# Patient Record
Sex: MALE | Race: WHITE | NOT HISPANIC OR LATINO | Employment: FULL TIME | URBAN - METROPOLITAN AREA
[De-identification: names, ages, dates, MRNs, and addresses within clinical notes are randomized per-mention and may not be internally consistent; named-entity substitution may affect disease eponyms.]

---

## 2017-01-22 RX ORDER — BENAZEPRIL HYDROCHLORIDE 20 MG/1
TABLET ORAL
Qty: 180 TABLET | Refills: 3 | Status: SHIPPED | OUTPATIENT
Start: 2017-01-22 | End: 2017-01-26 | Stop reason: SDUPTHER

## 2017-01-26 RX ORDER — BENAZEPRIL HYDROCHLORIDE 20 MG/1
20 TABLET ORAL 2 TIMES DAILY
Qty: 180 TABLET | Refills: 1 | Status: SHIPPED | OUTPATIENT
Start: 2017-01-26 | End: 2018-01-25 | Stop reason: SDUPTHER

## 2017-02-01 ENCOUNTER — OFFICE VISIT (OUTPATIENT)
Dept: FAMILY MEDICINE | Facility: CLINIC | Age: 72
End: 2017-02-01
Payer: MEDICARE

## 2017-02-01 VITALS
WEIGHT: 172.06 LBS | HEART RATE: 60 BPM | DIASTOLIC BLOOD PRESSURE: 60 MMHG | TEMPERATURE: 98 F | SYSTOLIC BLOOD PRESSURE: 115 MMHG | BODY MASS INDEX: 22.09 KG/M2

## 2017-02-01 DIAGNOSIS — E78.5 HYPERLIPIDEMIA, UNSPECIFIED HYPERLIPIDEMIA TYPE: ICD-10-CM

## 2017-02-01 DIAGNOSIS — Z85.820 HISTORY OF MALIGNANT MELANOMA: ICD-10-CM

## 2017-02-01 DIAGNOSIS — F17.200 TOBACCO USE DISORDER: ICD-10-CM

## 2017-02-01 DIAGNOSIS — Z23 IMMUNIZATION DUE: ICD-10-CM

## 2017-02-01 DIAGNOSIS — I10 ESSENTIAL HYPERTENSION: Primary | ICD-10-CM

## 2017-02-01 DIAGNOSIS — J41.0 SIMPLE CHRONIC BRONCHITIS: ICD-10-CM

## 2017-02-01 PROCEDURE — G0008 ADMIN INFLUENZA VIRUS VAC: HCPCS | Mod: PBBFAC,PO | Performed by: FAMILY MEDICINE

## 2017-02-01 PROCEDURE — 99999 PR PBB SHADOW E&M-EST. PATIENT-LVL II: CPT | Mod: PBBFAC,,, | Performed by: FAMILY MEDICINE

## 2017-02-01 PROCEDURE — 90670 PCV13 VACCINE IM: CPT | Mod: PBBFAC,PO | Performed by: FAMILY MEDICINE

## 2017-02-01 PROCEDURE — 99212 OFFICE O/P EST SF 10 MIN: CPT | Mod: PBBFAC,PO | Performed by: FAMILY MEDICINE

## 2017-02-01 PROCEDURE — 99214 OFFICE O/P EST MOD 30 MIN: CPT | Mod: S$PBB,,, | Performed by: FAMILY MEDICINE

## 2017-02-01 NOTE — MR AVS SNAPSHOT
[facility]  [address]  [place] [address]  Phone: [phone]  Fax: [phone]                  Dutch HINSON Soy   2017 1:30 PM   Office Visit    Description:  Male : 1945   Provider:  Azam Nicole MD   Department:  [facility]           Diagnoses this Visit        Comments    Essential hypertension    -  Primary     Hyperlipidemia, unspecified hyperlipidemia type         Simple chronic bronchitis         Tobacco use disorder     1ppd    History of malignant melanoma         Immunization due                To Do List           Future Appointments        Provider Department Dept Phone    2017 10:00 AM LAB, [place] [facility] [phone]    2017 10:15 AM NS XR2 [facility] [phone]    2017 9:40 AM Roni Olivier NP [facility] [phone]      Goals (5 Years of Data)     None      [facility]     [facility] Nurse Care Line -  Assistance  Registered nurses in the [facility] provide clinical advisement, health education, appointment booking, and other advisory services.  Call for this free service at [phone].             Medications           Message regarding Medications     Verify the changes and/or additions to your medication regime listed below are the same as discussed with your clinician today.  If any of these changes or additions are incorrect, please notify your healthcare provider.             Verify that the below list of medications is an accurate representation of the medications you are currently taking.  If none reported, the list may be blank. If incorrect, please contact your healthcare provider. Carry this list with you in case of emergency.           Current Medications     b complex vitamins tablet Take 1 tablet by mouth once daily.      benazepril (LOTENSIN) 20 MG tablet Take 1 tablet (20 mg total) by mouth 2 (two)  times daily.    multivitamin capsule Take 1 capsule by mouth once daily.             Clinical Reference Information           Vital Signs - Last Recorded  Most recent update: 2/1/2017  1:29 PM by Azam Nicole MD    BP Pulse Temp Wt BMI    115/60 60 98.3 °F (36.8 °C) 78 kg (172 lb 1.1 oz) 22.09 kg/m2      Blood Pressure          Most Recent Value    BP  115/60      Allergies as of 2/1/2017     Aspirin      Immunizations Administered on Date of Encounter - 2/1/2017     Name Date Dose VIS Date Route    Influenza - High Dose 2/1/2017 0.5 mL 8/7/2015 Intramuscular    Pneumococcal Conjugate - 13 Valent 2/1/2017 0.5 mL 11/5/2015 Intramuscular      Orders Placed During Today's Visit      Normal Orders This Visit    Influenza - High Dose (65+) (PF) (IM)     Pneumococcal Conjugate Vaccine (13 Valent) (IM)       Smoking Cessation     If you would like to quit smoking:   You may be eligible for free services if you are a [place] resident and started smoking cigarettes before September 1, 1988.  Call the [employer]ess[employer] (S[employer]) toll free at ([phone] [phone] or ([phone] [phone].   Call 1-596-QUIT-NOW if you do not meet the above criteria.

## 2017-02-01 NOTE — PROGRESS NOTES
Subjective:       Patient ID: Dutch Olivier is a 71 y.o. male.    Chief Complaint: No chief complaint on file.    HPI Comments: He is here for follow-up of hypertension.  His blood pressure has been running between 110 and 135.  He tolerates benazepril.  He is not having any significant lightheadedness.  He is working fairly hard at his job right now.  He has COPD with a daily cough but does not complain of shortness of breath.  He is experienced prostate cancer and malignant melanoma.  He shows no signs of recurrence.  He continues to smoke 1 pack per day.  He does have varicose veins but does not need any intervention.    Review of Systems   Constitutional: Negative for fever and unexpected weight change.   Respiratory: Positive for cough. Negative for shortness of breath.    Cardiovascular: Negative for chest pain, palpitations and leg swelling.   Gastrointestinal: Negative for abdominal pain.   Skin:        He sees dermatology on a regular basis.   Neurological: Negative for dizziness and headaches.   Psychiatric/Behavioral: The patient is not nervous/anxious.        Objective:     Blood pressure 115/60, pulse 60, temperature 98.3 °F (36.8 °C), weight 78 kg (172 lb 1.1 oz).      Physical Exam   Constitutional:   He is thin and weather beaten and in no distress.   Cardiovascular: Normal rate and regular rhythm.    Murmur heard.  Pulmonary/Chest: No respiratory distress.   He has a few rhonchi mostly right posterior   Abdominal: Soft. Bowel sounds are normal. He exhibits no distension and no mass. There is no tenderness.   Neurological: He is alert.   Skin:   I examined his head and neck.  His right ear has healed very well.  His ear surgery was in 2010.  He has a dark papule on his left fore head but it has been stable.       Assessment:       1. Essential hypertension    2. Hyperlipidemia, unspecified hyperlipidemia type    3. Simple chronic bronchitis    4. Tobacco use disorder    5. History of malignant  melanoma    6. Immunization due        Plan:       Prevnar.    flu shot.  I reviewed recent lab.  He does have an elevation of MCV.  He drinks 2 glasses of wine at night.

## 2017-07-24 ENCOUNTER — TELEPHONE (OUTPATIENT)
Dept: HEMATOLOGY/ONCOLOGY | Facility: CLINIC | Age: 72
End: 2017-07-24

## 2017-07-24 DIAGNOSIS — Z85.820 PERSONAL HISTORY OF MALIGNANT MELANOMA OF SKIN: Primary | ICD-10-CM

## 2017-08-23 ENCOUNTER — HOSPITAL ENCOUNTER (OUTPATIENT)
Dept: RADIOLOGY | Facility: HOSPITAL | Age: 72
Discharge: HOME | End: 2017-08-23
Attending: NURSE PRACTITIONER
Payer: MEDICARE

## 2017-08-23 DIAGNOSIS — Z85.820 PERSONAL HISTORY OF MALIGNANT MELANOMA OF SKIN: ICD-10-CM

## 2017-08-23 PROCEDURE — 71020 XR CHEST PA AND LATERAL: CPT | Mod: TC,PO

## 2017-08-23 PROCEDURE — 71020 XR CHEST PA AND LATERAL: CPT | Mod: 26,,, | Performed by: RADIOLOGY

## 2017-08-24 ENCOUNTER — OFFICE VISIT (OUTPATIENT)
Dept: HEMATOLOGY/ONCOLOGY | Facility: CLINIC | Age: 72
End: 2017-08-24
Payer: MEDICARE

## 2017-08-24 VITALS
WEIGHT: 164.25 LBS | HEIGHT: 74 IN | RESPIRATION RATE: 17 BRPM | SYSTOLIC BLOOD PRESSURE: 138 MMHG | BODY MASS INDEX: 21.08 KG/M2 | HEART RATE: 80 BPM | TEMPERATURE: 98 F | DIASTOLIC BLOOD PRESSURE: 66 MMHG

## 2017-08-24 DIAGNOSIS — Z85.820 HISTORY OF MALIGNANT MELANOMA: Primary | ICD-10-CM

## 2017-08-24 DIAGNOSIS — Z86.002 HISTORY OF CARCINOMA IN SITU OF PROSTATE: ICD-10-CM

## 2017-08-24 PROCEDURE — 1126F AMNT PAIN NOTED NONE PRSNT: CPT | Mod: ,,, | Performed by: NURSE PRACTITIONER

## 2017-08-24 PROCEDURE — 99999 PR PBB SHADOW E&M-EST. PATIENT-LVL III: CPT | Mod: PBBFAC,,, | Performed by: NURSE PRACTITIONER

## 2017-08-24 PROCEDURE — 1159F MED LIST DOCD IN RCRD: CPT | Mod: ,,, | Performed by: NURSE PRACTITIONER

## 2017-08-24 PROCEDURE — 3075F SYST BP GE 130 - 139MM HG: CPT | Mod: ,,, | Performed by: NURSE PRACTITIONER

## 2017-08-24 PROCEDURE — 3078F DIAST BP <80 MM HG: CPT | Mod: ,,, | Performed by: NURSE PRACTITIONER

## 2017-08-24 PROCEDURE — 99213 OFFICE O/P EST LOW 20 MIN: CPT | Mod: PBBFAC,PN | Performed by: NURSE PRACTITIONER

## 2017-08-24 PROCEDURE — 99213 OFFICE O/P EST LOW 20 MIN: CPT | Mod: S$PBB,,, | Performed by: NURSE PRACTITIONER

## 2017-08-24 NOTE — PROGRESS NOTES
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  He   is status post surgical resection and nine of a planned 12-month adjuvant alpha   interferon-2 beta.  He presents to the clinic today for his 72-month   post-therapy evaluation from resection.  He continues to follow with Dr. Lenin Myles, Dermatology, every six months with few keratoses.  He   denies any difficulties with fevers, chills, drenching night sweats, unexplained   weight loss, painful lymphadenopathy, rashes, pruritus, abdominal discomfort,   nausea, vomiting, constipation, diarrhea, bleeding, etc.  No other new   complaints or pertinent findings on a 10-point review of systems.    PHYSICAL EXAMINATION:  GENERAL:  Well-developed, well-nourished white gentleman in no acute distress.    Alert and oriented x3.  VITAL SIGNS:  Weight 164.2 pounds (loss of 10 pounds in one year with diet), /66,   pulse 80, respirations 17, temperature 98.2.  HEENT:  Normocephalic, atraumatic.  Oral mucosa pink and moist.  Lips without   lesions.  Tongue midline.  Oropharynx clear.  Nonicteric sclerae.  NECK:  Supple.  No adenopathy.  HEART:  Regular rate and rhythm without murmur, gallop or rub.  LUNGS:  Clear to auscultation bilaterally.  ABDOMEN:  Soft, nontender and nondistended with positive normoactive bowel   sounds.  No hepatosplenomegaly.  EXTREMITIES:  No cyanosis, clubbing or edema.  Distal pulses are intact.     AXILLAE AND GROIN:  No palpable pathologic adenopathy appreciated.  SKIN:  No signs of local reoccurrence about the patient's reconstructed right ear.    LABORATORY DATA:    Lab Results   Component Value Date    WBC 9.25 08/23/2017    HGB 15.6 08/23/2017    HCT 43.9 08/23/2017    MCV 91 08/23/2017     08/23/2017     Unremarkable differential    CMP  Sodium   Date Value Ref Range Status   08/23/2017 141 136 - 145 mmol/L Final      Potassium   Date Value Ref Range Status   08/23/2017 4.8 3.5 - 5.1 mmol/L Final     Chloride   Date Value Ref Range Status   08/23/2017 103 95 - 110 mmol/L Final     CO2   Date Value Ref Range Status   08/23/2017 27 23 - 29 mmol/L Final     Glucose   Date Value Ref Range Status   08/23/2017 112 (H) 70 - 110 mg/dL Final     BUN, Bld   Date Value Ref Range Status   08/23/2017 11 8 - 23 mg/dL Final     Creatinine   Date Value Ref Range Status   08/23/2017 0.8 0.5 - 1.4 mg/dL Final     Calcium   Date Value Ref Range Status   08/23/2017 9.5 8.7 - 10.5 mg/dL Final     Total Protein   Date Value Ref Range Status   08/23/2017 7.0 6.0 - 8.4 g/dL Final     Albumin   Date Value Ref Range Status   08/23/2017 4.0 3.5 - 5.2 g/dL Final     Total Bilirubin   Date Value Ref Range Status   08/23/2017 1.0 0.1 - 1.0 mg/dL Final     Comment:     For infants and newborns, interpretation of results should be based  on gestational age, weight and in agreement with clinical  observations.  Premature Infant recommended reference ranges:  Up to 24 hours.............<8.0 mg/dL  Up to 48 hours............<12.0 mg/dL  3-5 days..................<15.0 mg/dL  6-29 days.................<15.0 mg/dL       Alkaline Phosphatase   Date Value Ref Range Status   08/23/2017 77 55 - 135 U/L Final     AST   Date Value Ref Range Status   08/23/2017 15 10 - 40 U/L Final     ALT   Date Value Ref Range Status   08/23/2017 16 10 - 44 U/L Final     Anion Gap   Date Value Ref Range Status   08/23/2017 11 8 - 16 mmol/L Final     eGFR if    Date Value Ref Range Status   08/23/2017 >60 >60 mL/min/1.73 m^2 Final     eGFR if non    Date Value Ref Range Status   08/23/2017 >60 >60 mL/min/1.73 m^2 Final     Comment:     Calculation used to obtain the estimated glomerular filtration  rate (eGFR) is the CKD-EPI equation. Since race is unknown   in our information system, the eGFR values for   -American and Non--American  patients are given   for each creatinine result.           RADIOLOGY:  Chest x-ray dated 08/23/2017.  Impression:  No acute cardiopulmonary   abnormality appreciated.  No interval detrimental change when compared with prior study   of 07/21/2016.    IMPRESSION:  1.  Stage II-C malignant melanoma arising in the right ear - MARCOS.  2.  History of resected prostate carcinoma.    PLAN:  1.  Return in one year with interval CBC, CMP, LDH, and chest x-ray prior for surveillance.  2.  Continue to follow with Dermatology (Dr. Lenin Myles) every six months.    Assessment/plan reviewed and approved by Dr. Garcia.

## 2017-11-09 ENCOUNTER — OFFICE VISIT (OUTPATIENT)
Dept: OPTOMETRY | Facility: CLINIC | Age: 72
End: 2017-11-09
Payer: MEDICARE

## 2017-11-09 DIAGNOSIS — H52.7 REFRACTIVE ERROR: ICD-10-CM

## 2017-11-09 DIAGNOSIS — H53.022 REFRACTIVE AMBLYOPIA OF LEFT EYE: ICD-10-CM

## 2017-11-09 DIAGNOSIS — H25.13 NUCLEAR SCLEROSIS, BILATERAL: Primary | ICD-10-CM

## 2017-11-09 PROCEDURE — 99211 OFF/OP EST MAY X REQ PHY/QHP: CPT | Mod: PBBFAC,PO | Performed by: OPTOMETRIST

## 2017-11-09 PROCEDURE — 92015 DETERMINE REFRACTIVE STATE: CPT | Mod: ,,, | Performed by: OPTOMETRIST

## 2017-11-09 PROCEDURE — 92014 COMPRE OPH EXAM EST PT 1/>: CPT | Mod: S$PBB,,, | Performed by: OPTOMETRIST

## 2017-11-09 PROCEDURE — 99999 PR PBB SHADOW E&M-EST. PATIENT-LVL I: CPT | Mod: PBBFAC,,, | Performed by: OPTOMETRIST

## 2017-11-09 NOTE — PROGRESS NOTES
HPI     Presenting Complaint: Pt here today for yearly eye exam. DLE 2 years ago    Pt sates vision has been stable with current glasses from 2015. Pt lost   glasses today and has not been wearing them.     Hx of amblyopia left eye, pt prefers glasses to be balanced.     Ophthalmic medication / drops: None    (-) Pain   (-) headaches  (-) diplopia   (-) flashes / (+) hx of floaters both eyes       Last edited by Vinicio Chowdary, OD on 11/9/2017  2:30 PM. (History)            Assessment /Plan     For exam results, see Encounter Report.    Nuclear sclerosis, bilateral    Refractive amblyopia of left eye    Refractive error      Mild NS OU. Discussed possible ocular affects of cataracts. Acceptable BCVA OU. Discussed treatment options. Surgery not recommended at this time. Monitor yearly.     Refractive amblyopia OS, longstanding, stable. Pt prefers balanced spec Rx. Dispensed updated spectacle Rx for FTW, recommend polycarbonate lenses for eye protection. Discussed various spectacle lens options, pt prefers distance only. Discussed adaptation period to new specs. Discussed monocular precautions.     OTC +4.00 readers prn for near.       RTC in 1 year for comprehensive eye exam, or sooner prn.

## 2018-01-04 ENCOUNTER — TELEPHONE (OUTPATIENT)
Dept: FAMILY MEDICINE | Facility: CLINIC | Age: 73
End: 2018-01-04

## 2018-01-04 DIAGNOSIS — C61 MALIGNANT NEOPLASM OF PROSTATE: ICD-10-CM

## 2018-01-04 DIAGNOSIS — E78.5 HYPERLIPIDEMIA, UNSPECIFIED HYPERLIPIDEMIA TYPE: ICD-10-CM

## 2018-01-04 DIAGNOSIS — Z86.002 HISTORY OF CARCINOMA IN SITU OF PROSTATE: ICD-10-CM

## 2018-01-04 DIAGNOSIS — I10 HYPERTENSION, UNSPECIFIED TYPE: Primary | ICD-10-CM

## 2018-01-04 NOTE — TELEPHONE ENCOUNTER
----- Message from Yocasta Doll sent at 1/4/2018  9:26 AM CST -----  Contact: self  [phone]  Patient called asking annual labs and  psa prior to seeing the doctor.. . Thanks!

## 2018-01-16 ENCOUNTER — LAB VISIT (OUTPATIENT)
Dept: LAB | Facility: HOSPITAL | Age: 73
End: 2018-01-16
Attending: FAMILY MEDICINE
Payer: MEDICARE

## 2018-01-16 DIAGNOSIS — E78.5 HYPERLIPIDEMIA, UNSPECIFIED HYPERLIPIDEMIA TYPE: ICD-10-CM

## 2018-01-16 DIAGNOSIS — C61 MALIGNANT NEOPLASM OF PROSTATE: ICD-10-CM

## 2018-01-16 DIAGNOSIS — I10 HYPERTENSION, UNSPECIFIED TYPE: ICD-10-CM

## 2018-01-16 DIAGNOSIS — Z86.002 HISTORY OF CARCINOMA IN SITU OF PROSTATE: ICD-10-CM

## 2018-01-16 LAB
ALBUMIN SERPL BCP-MCNC: 3.7 G/DL
ALP SERPL-CCNC: 72 U/L
ALT SERPL W/O P-5'-P-CCNC: 13 U/L
ANION GAP SERPL CALC-SCNC: 7 MMOL/L
AST SERPL-CCNC: 15 U/L
BILIRUB SERPL-MCNC: 0.8 MG/DL
BUN SERPL-MCNC: 14 MG/DL
CALCIUM SERPL-MCNC: 9.3 MG/DL
CHLORIDE SERPL-SCNC: 107 MMOL/L
CHOLEST SERPL-MCNC: 226 MG/DL
CHOLEST/HDLC SERPL: 3.3 {RATIO}
CO2 SERPL-SCNC: 26 MMOL/L
COMPLEXED PSA SERPL-MCNC: 0.04 NG/ML
CREAT SERPL-MCNC: 0.8 MG/DL
EST. GFR  (AFRICAN AMERICAN): >60 ML/MIN/1.73 M^2
EST. GFR  (NON AFRICAN AMERICAN): >60 ML/MIN/1.73 M^2
GLUCOSE SERPL-MCNC: 94 MG/DL
HDLC SERPL-MCNC: 69 MG/DL
HDLC SERPL: 30.5 %
LDLC SERPL CALC-MCNC: 134.6 MG/DL
NONHDLC SERPL-MCNC: 157 MG/DL
POTASSIUM SERPL-SCNC: 4.6 MMOL/L
PROT SERPL-MCNC: 7 G/DL
SODIUM SERPL-SCNC: 140 MMOL/L
TRIGL SERPL-MCNC: 112 MG/DL

## 2018-01-16 PROCEDURE — 36415 COLL VENOUS BLD VENIPUNCTURE: CPT | Mod: PN

## 2018-01-16 PROCEDURE — 80053 COMPREHEN METABOLIC PANEL: CPT

## 2018-01-16 PROCEDURE — 84153 ASSAY OF PSA TOTAL: CPT

## 2018-01-16 PROCEDURE — 80061 LIPID PANEL: CPT

## 2018-01-25 ENCOUNTER — OFFICE VISIT (OUTPATIENT)
Dept: FAMILY MEDICINE | Facility: CLINIC | Age: 73
End: 2018-01-25
Payer: MEDICARE

## 2018-01-25 VITALS
DIASTOLIC BLOOD PRESSURE: 72 MMHG | HEART RATE: 68 BPM | TEMPERATURE: 99 F | HEIGHT: 74 IN | WEIGHT: 165.38 LBS | BODY MASS INDEX: 21.23 KG/M2 | SYSTOLIC BLOOD PRESSURE: 124 MMHG

## 2018-01-25 DIAGNOSIS — I10 ESSENTIAL HYPERTENSION: ICD-10-CM

## 2018-01-25 DIAGNOSIS — Z86.002 HISTORY OF CARCINOMA IN SITU OF PROSTATE: ICD-10-CM

## 2018-01-25 DIAGNOSIS — Z85.820 HISTORY OF MALIGNANT MELANOMA: ICD-10-CM

## 2018-01-25 DIAGNOSIS — E78.5 HYPERLIPIDEMIA, UNSPECIFIED HYPERLIPIDEMIA TYPE: ICD-10-CM

## 2018-01-25 DIAGNOSIS — F17.200 TOBACCO USE DISORDER: Primary | ICD-10-CM

## 2018-01-25 PROCEDURE — 99213 OFFICE O/P EST LOW 20 MIN: CPT | Mod: PBBFAC,PN,25 | Performed by: FAMILY MEDICINE

## 2018-01-25 PROCEDURE — 99214 OFFICE O/P EST MOD 30 MIN: CPT | Mod: S$PBB,,, | Performed by: FAMILY MEDICINE

## 2018-01-25 PROCEDURE — 99999 PR PBB SHADOW E&M-EST. PATIENT-LVL III: CPT | Mod: PBBFAC,,, | Performed by: FAMILY MEDICINE

## 2018-01-25 PROCEDURE — 90662 IIV NO PRSV INCREASED AG IM: CPT | Mod: PBBFAC,PN

## 2018-01-25 RX ORDER — BENAZEPRIL HYDROCHLORIDE 20 MG/1
20 TABLET ORAL 2 TIMES DAILY
Qty: 180 TABLET | Refills: 3 | Status: SHIPPED | OUTPATIENT
Start: 2018-01-25 | End: 2019-02-11 | Stop reason: SDUPTHER

## 2018-01-25 NOTE — PROGRESS NOTES
"Subjective:       Patient ID: Dutch Olivier is a 72 y.o. male.    Chief Complaint: Annual Exam (Annual check up. Lab done. Needs flu shot)    He is here for an annual exam.  He has well-controlled hypertension.  He takes benazepril 20 mg twice a day because he was having episodes of high blood pressure certain times of the day.  He continues to smoke and has COPD but not much in the way of significant symptoms.  Mild cholesterol elevation.  His AHA risk because of age hypertension and smoking is 27%.  He is not interested in cholesterol medication.  He is followed by Dr. Lenin Myles for skin cancer surveillance.  He had prostate cancer many years ago.  His most recent PSA is 0.04.      Review of Systems   Constitutional: Negative for fatigue, fever and unexpected weight change.   HENT: Negative.    Eyes: Negative for visual disturbance.   Respiratory: Positive for cough. Negative for shortness of breath and wheezing.    Cardiovascular: Negative for chest pain, palpitations and leg swelling.   Gastrointestinal: Positive for abdominal pain (ccasional stomach cramps). Negative for blood in stool and diarrhea.   Genitourinary: Negative for difficulty urinating and hematuria.   Skin:        No neoplasms    Neurological: Negative for weakness and numbness.       Objective:     Blood pressure 124/72, pulse 68, temperature 98.8 °F (37.1 °C), temperature source Oral, height 6' 2" (1.88 m), weight 75 kg (165 lb 5.5 oz).      Physical Exam   Constitutional: He appears well-developed and well-nourished.   No distress   HENT:   Nose clear. TM's wnl. No oral lesions.    Eyes: Conjunctivae and EOM are normal. Pupils are equal, round, and reactive to light.   Neck: Normal range of motion. No thyromegaly present.   Cardiovascular: Normal rate, regular rhythm and intact distal pulses.    Murmur (grade 1 systolic murmur heard best at the left lower sternal border) heard.  Pulmonary/Chest: Effort normal and breath sounds normal. He " has no wheezes. He has no rales.   Abdominal: Soft. Bowel sounds are normal. He exhibits no mass. There is no tenderness.   Musculoskeletal: He exhibits no edema.   Lymphadenopathy:     He has no cervical adenopathy.   Neurological: He is alert.   Skin:   Lots of actinic keratoses especially on the arms and hands.  He has a focal area of dermatitis that looks nonspecific.       Assessment:       1. Tobacco use disorder    2. History of malignant melanoma    3. History of carcinoma in situ of prostate    4. Essential hypertension    5. Hyperlipidemia, unspecified hyperlipidemia type        Plan:       I refilled benazepril.  Flu shot today.

## 2018-08-22 ENCOUNTER — HOSPITAL ENCOUNTER (OUTPATIENT)
Dept: RADIOLOGY | Facility: HOSPITAL | Age: 73
Discharge: HOME | End: 2018-08-22
Attending: NURSE PRACTITIONER
Payer: MEDICARE

## 2018-08-22 DIAGNOSIS — Z85.820 HISTORY OF MALIGNANT MELANOMA: ICD-10-CM

## 2018-08-22 PROCEDURE — 71046 X-RAY EXAM CHEST 2 VIEWS: CPT | Mod: 26,,, | Performed by: RADIOLOGY

## 2018-08-22 PROCEDURE — 71046 X-RAY EXAM CHEST 2 VIEWS: CPT | Mod: TC,FY,PO

## 2018-08-27 ENCOUNTER — OFFICE VISIT (OUTPATIENT)
Dept: HEMATOLOGY/ONCOLOGY | Facility: CLINIC | Age: 73
End: 2018-08-27
Payer: MEDICARE

## 2018-08-27 VITALS
SYSTOLIC BLOOD PRESSURE: 142 MMHG | RESPIRATION RATE: 18 BRPM | WEIGHT: 162.25 LBS | DIASTOLIC BLOOD PRESSURE: 65 MMHG | BODY MASS INDEX: 20.82 KG/M2 | HEART RATE: 55 BPM | HEIGHT: 74 IN | TEMPERATURE: 98 F

## 2018-08-27 DIAGNOSIS — Z85.820 HISTORY OF MALIGNANT MELANOMA: Primary | ICD-10-CM

## 2018-08-27 DIAGNOSIS — J44.9 CHRONIC OBSTRUCTIVE PULMONARY DISEASE, UNSPECIFIED COPD TYPE: ICD-10-CM

## 2018-08-27 DIAGNOSIS — F17.200 TOBACCO USE DISORDER: ICD-10-CM

## 2018-08-27 DIAGNOSIS — I10 ESSENTIAL HYPERTENSION: ICD-10-CM

## 2018-08-27 PROCEDURE — 99999 PR PBB SHADOW E&M-EST. PATIENT-LVL III: CPT | Mod: PBBFAC,,, | Performed by: NURSE PRACTITIONER

## 2018-08-27 PROCEDURE — 99213 OFFICE O/P EST LOW 20 MIN: CPT | Mod: S$PBB,,, | Performed by: NURSE PRACTITIONER

## 2018-08-27 PROCEDURE — 99213 OFFICE O/P EST LOW 20 MIN: CPT | Mod: PBBFAC,PN | Performed by: NURSE PRACTITIONER

## 2018-08-27 NOTE — PROGRESS NOTES
HISTORY OF PRESENT ILLNESS:  This is a 73-year-old white gentleman known   to Dr Esquivel for Stage II-C Malignant Melanoma arising in the patient's right ear.    He is status post surgical resection and nine of a planned 12-month adjuvant alpha   interferon-2 beta.  He presents to the clinic today for his 84-month post-therapy   evaluation from resection.  He continues to follow with Dr. Lnein Myles, Dermatology,   every six months with few keratoses.  He reports a basal cell taken off from his   right inner ankle requiring Zara's procedure by Dr. Palmer.  He denies any difficulties   with fevers, chills, drenching night sweats, unexplained weight loss, painful lymphadenopathy,   rashes, pruritus, abdominal discomfort, nausea, vomiting, constipation, diarrhea,   bleeding, etc.  No other new complaints or pertinent findings on a 10-point review   of systems.    PHYSICAL EXAMINATION:  GENERAL:  Well-developed, well-nourished white gentleman in no acute distress.    Alert and oriented x3.  VITAL SIGNS:  Weight:  Loss of 2 pounds in 1 year  Wt Readings from Last 3 Encounters:   08/27/18 73.6 kg (162 lb 4.1 oz)   01/25/18 75 kg (165 lb 5.5 oz)   08/24/17 74.5 kg (164 lb 3.9 oz)     Temp Readings from Last 3 Encounters:   08/27/18 98.3 °F (36.8 °C)   01/25/18 98.8 °F (37.1 °C) (Oral)   08/24/17 98.2 °F (36.8 °C)     BP Readings from Last 3 Encounters:   08/27/18 (!) 142/65   01/25/18 124/72   08/24/17 138/66     Pulse Readings from Last 3 Encounters:   08/27/18 (!) 55   01/25/18 68   08/24/17 80       HEENT:  Normocephalic, atraumatic.  Oral mucosa pink and moist.  Lips without   lesions.  Tongue midline.  Oropharynx clear.  Nonicteric sclerae.  NECK:  Supple.  No adenopathy.  HEART:  Regular rate and rhythm without murmur, gallop or rub.  LUNGS:  Clear to auscultation bilaterally.  ABDOMEN:  Soft, nontender and nondistended with positive normoactive bowel   sounds.  No hepatosplenomegaly.  EXTREMITIES:  No cyanosis,  clubbing or edema.  Distal pulses are intact.     AXILLAE AND GROIN:  No palpable pathologic adenopathy appreciated.  SKIN:  No signs of local reoccurrence about the patient's reconstructed right ear.  Right inner ankle with erythematous 3 cm ulceration.  Nailbeds rigid, pale.    LABORATORY DATA:    Lab Results   Component Value Date    WBC 8.31 08/22/2018    HGB 16.2 08/22/2018    HCT 46.9 08/22/2018    MCV 94 08/22/2018     08/22/2018     Unremarkable differential    CMP  Sodium   Date Value Ref Range Status   08/22/2018 141 136 - 145 mmol/L Final     Potassium   Date Value Ref Range Status   08/22/2018 4.6 3.5 - 5.1 mmol/L Final     Chloride   Date Value Ref Range Status   08/22/2018 107 95 - 110 mmol/L Final     CO2   Date Value Ref Range Status   08/22/2018 27 23 - 29 mmol/L Final     Glucose   Date Value Ref Range Status   08/22/2018 116 (H) 70 - 110 mg/dL Final     BUN, Bld   Date Value Ref Range Status   08/22/2018 11 8 - 23 mg/dL Final     Creatinine   Date Value Ref Range Status   08/22/2018 0.8 0.5 - 1.4 mg/dL Final     Calcium   Date Value Ref Range Status   08/22/2018 9.6 8.7 - 10.5 mg/dL Final     Total Protein   Date Value Ref Range Status   08/22/2018 6.8 6.0 - 8.4 g/dL Final     Albumin   Date Value Ref Range Status   08/22/2018 4.0 3.5 - 5.2 g/dL Final     Total Bilirubin   Date Value Ref Range Status   08/22/2018 0.9 0.1 - 1.0 mg/dL Final     Comment:     For infants and newborns, interpretation of results should be based  on gestational age, weight and in agreement with clinical  observations.  Premature Infant recommended reference ranges:  Up to 24 hours.............<8.0 mg/dL  Up to 48 hours............<12.0 mg/dL  3-5 days..................<15.0 mg/dL  6-29 days.................<15.0 mg/dL       Alkaline Phosphatase   Date Value Ref Range Status   08/22/2018 69 55 - 135 U/L Final     AST   Date Value Ref Range Status   08/22/2018 16 10 - 40 U/L Final     ALT   Date Value Ref Range  Status   08/22/2018 16 10 - 44 U/L Final     Anion Gap   Date Value Ref Range Status   08/22/2018 7 (L) 8 - 16 mmol/L Final     eGFR if    Date Value Ref Range Status   08/22/2018 >60 >60 mL/min/1.73 m^2 Final     eGFR if non    Date Value Ref Range Status   08/22/2018 >60 >60 mL/min/1.73 m^2 Final     Comment:     Calculation used to obtain the estimated glomerular filtration  rate (eGFR) is the CKD-EPI equation.            RADIOLOGY:  Chest x-ray dated 08/22/2018.  Impression:  No radiographic evidence of active chest disease.    IMPRESSION:  1.  Stage II-C malignant melanoma arising in the right ear - MARCOS.  2.  History of resected prostate carcinoma - recent PSA = 0.04  3.  Tobacco abuse - not interested in stopping  4.  COPD - follow with Dr. Nicole  5.  HTN - on Benazepril, follows with Dr. Nicole (last appt 01/25/18)    PLAN:  1.  Return in one year with interval CBC, CMP, LDH, and chest x-ray prior for surveillance.  2.  Continue to follow with Dermatology (Dr. Lenin Myles) every six months.  3.  Instructed on s/s of infection to report to Dr. Palmer; add Protein to diet; elevate legs to   assist with circulation.    Assessment/plan reviewed and approved by Dr. Esquivel.

## 2019-02-11 DIAGNOSIS — D07.5 CARCINOMA IN SITU OF PROSTATE: ICD-10-CM

## 2019-02-11 DIAGNOSIS — Z86.002 HISTORY OF CARCINOMA IN SITU OF PROSTATE: ICD-10-CM

## 2019-02-11 DIAGNOSIS — I10 ESSENTIAL HYPERTENSION: Primary | ICD-10-CM

## 2019-02-11 RX ORDER — BENAZEPRIL HYDROCHLORIDE 20 MG/1
TABLET ORAL
Qty: 180 TABLET | Refills: 0 | Status: SHIPPED | OUTPATIENT
Start: 2019-02-11 | End: 2019-05-09 | Stop reason: SDUPTHER

## 2019-05-09 ENCOUNTER — OFFICE VISIT (OUTPATIENT)
Dept: FAMILY MEDICINE | Facility: CLINIC | Age: 74
End: 2019-05-09
Payer: MEDICARE

## 2019-05-09 ENCOUNTER — HOSPITAL ENCOUNTER (OUTPATIENT)
Dept: RADIOLOGY | Facility: HOSPITAL | Age: 74
Discharge: HOME | End: 2019-05-09
Attending: FAMILY MEDICINE
Payer: MEDICARE

## 2019-05-09 VITALS
DIASTOLIC BLOOD PRESSURE: 68 MMHG | BODY MASS INDEX: 21.71 KG/M2 | TEMPERATURE: 99 F | SYSTOLIC BLOOD PRESSURE: 118 MMHG | HEART RATE: 60 BPM | WEIGHT: 169.19 LBS | HEIGHT: 74 IN

## 2019-05-09 DIAGNOSIS — Z86.002 HISTORY OF CARCINOMA IN SITU OF PROSTATE: ICD-10-CM

## 2019-05-09 DIAGNOSIS — F17.200 TOBACCO USE DISORDER: ICD-10-CM

## 2019-05-09 DIAGNOSIS — J44.9 CHRONIC OBSTRUCTIVE PULMONARY DISEASE, UNSPECIFIED COPD TYPE: ICD-10-CM

## 2019-05-09 DIAGNOSIS — J02.9 SORE THROAT: ICD-10-CM

## 2019-05-09 DIAGNOSIS — I10 ESSENTIAL HYPERTENSION: Primary | ICD-10-CM

## 2019-05-09 DIAGNOSIS — R01.1 SYSTOLIC MURMUR: ICD-10-CM

## 2019-05-09 PROCEDURE — 3074F SYST BP LT 130 MM HG: CPT | Mod: CPTII,S$GLB,, | Performed by: FAMILY MEDICINE

## 2019-05-09 PROCEDURE — 71046 XR CHEST PA AND LATERAL: ICD-10-PCS | Mod: 26,,, | Performed by: RADIOLOGY

## 2019-05-09 PROCEDURE — 71046 X-RAY EXAM CHEST 2 VIEWS: CPT | Mod: TC,PN

## 2019-05-09 PROCEDURE — 3074F PR MOST RECENT SYSTOLIC BLOOD PRESSURE < 130 MM HG: ICD-10-PCS | Mod: CPTII,S$GLB,, | Performed by: FAMILY MEDICINE

## 2019-05-09 PROCEDURE — 99999 PR PBB SHADOW E&M-EST. PATIENT-LVL IV: CPT | Mod: PBBFAC,,, | Performed by: FAMILY MEDICINE

## 2019-05-09 PROCEDURE — 99499 RISK ADDL DX/OHS AUDIT: ICD-10-PCS | Mod: S$GLB,,, | Performed by: FAMILY MEDICINE

## 2019-05-09 PROCEDURE — 71046 X-RAY EXAM CHEST 2 VIEWS: CPT | Mod: 26,,, | Performed by: RADIOLOGY

## 2019-05-09 PROCEDURE — 99214 PR OFFICE/OUTPT VISIT, EST, LEVL IV, 30-39 MIN: ICD-10-PCS | Mod: S$GLB,,, | Performed by: FAMILY MEDICINE

## 2019-05-09 PROCEDURE — 99999 PR PBB SHADOW E&M-EST. PATIENT-LVL IV: ICD-10-PCS | Mod: PBBFAC,,, | Performed by: FAMILY MEDICINE

## 2019-05-09 PROCEDURE — 3078F DIAST BP <80 MM HG: CPT | Mod: CPTII,S$GLB,, | Performed by: FAMILY MEDICINE

## 2019-05-09 PROCEDURE — 99214 OFFICE O/P EST MOD 30 MIN: CPT | Mod: S$GLB,,, | Performed by: FAMILY MEDICINE

## 2019-05-09 PROCEDURE — 3078F PR MOST RECENT DIASTOLIC BLOOD PRESSURE < 80 MM HG: ICD-10-PCS | Mod: CPTII,S$GLB,, | Performed by: FAMILY MEDICINE

## 2019-05-09 PROCEDURE — 99499 UNLISTED E&M SERVICE: CPT | Mod: S$GLB,,, | Performed by: FAMILY MEDICINE

## 2019-05-09 RX ORDER — BENAZEPRIL HYDROCHLORIDE 20 MG/1
20 TABLET ORAL 2 TIMES DAILY
Qty: 180 TABLET | Refills: 3 | Status: SHIPPED | OUTPATIENT
Start: 2019-05-09 | End: 2020-05-06

## 2019-05-09 NOTE — PROGRESS NOTES
"Subjective:       Patient ID: Dutch Olivier is a 73 y.o. male.    Chief Complaint: Annual Exam (Annual check up.)    Annual exam.  Follow-up hypertension.  He takes benazepril 20 mg twice a day.  His blood pressure has been in a reasonable range.  He has COPD which is stable on no medication.  He does relate a decrease in exercise tolerance over the past year.  He complains of some irritation is in his throat for about 5 weeks.  No hoarseness.  It seems to be more on the left.  He does admit to some runny nose.  He is a long-term smoker.  His prostate cancer was probably 15 years ago.  He has had normal PSAs.    Review of Systems   Constitutional: Positive for activity change. Negative for fatigue, fever and unexpected weight change.   HENT: Positive for rhinorrhea.    Eyes: Negative for visual disturbance.   Respiratory: Negative for cough, shortness of breath and wheezing.    Cardiovascular: Negative for chest pain, palpitations and leg swelling.   Gastrointestinal: Negative for abdominal pain, blood in stool and diarrhea.   Genitourinary: Negative for difficulty urinating and hematuria.   Skin:        No neoplasms    Neurological: Negative for weakness and numbness.       Objective:     Blood pressure 118/68, pulse 60, temperature 99.1 °F (37.3 °C), temperature source Oral, height 6' 2" (1.88 m), weight 76.8 kg (169 lb 3.3 oz).      Physical Exam   Constitutional: He appears well-developed and well-nourished.   No distress   HENT:   Mild nasal mucosal edema. I do not see any tongue neoplasms.  He seems to have some residual tonsillar tissue on the left with a small retention cyst.  TM's wnl.    Eyes: Pupils are equal, round, and reactive to light. Conjunctivae and EOM are normal.   Neck: Normal range of motion. No thyromegaly present.   Cardiovascular: Normal rate, regular rhythm and intact distal pulses.   Murmur (Grade 2 systolic murmur heard best at the left lower chest.) heard.  Possible radiation of the " murmur to the right carotid.   Pulmonary/Chest: Effort normal and breath sounds normal. He has no wheezes. He has no rales.   Abdominal: Soft. Bowel sounds are normal. He exhibits no mass. There is no tenderness.   Lymphadenopathy:     He has no cervical adenopathy.   Neurological: He is alert.   Skin:   Lots of actinic changes.  He sees Dermatology.       Assessment:       1. Essential hypertension    2. Chronic obstructive pulmonary disease, unspecified COPD type    3. Tobacco use disorder    4. History of carcinoma in situ of prostate    5. Systolic murmur    6. Sore throat        Plan:       ENT consult for more thorough exam with his smoking history.  Echocardiogram to delineate his heart murmur.  Lab work today.

## 2019-05-20 ENCOUNTER — CLINICAL SUPPORT (OUTPATIENT)
Dept: CARDIOLOGY | Facility: CLINIC | Age: 74
End: 2019-05-20
Attending: FAMILY MEDICINE
Payer: MEDICARE

## 2019-05-20 ENCOUNTER — OFFICE VISIT (OUTPATIENT)
Dept: OTOLARYNGOLOGY | Facility: CLINIC | Age: 74
End: 2019-05-20
Payer: MEDICARE

## 2019-05-20 VITALS — BODY MASS INDEX: 21.7 KG/M2 | WEIGHT: 169.06 LBS | HEIGHT: 74 IN

## 2019-05-20 VITALS
HEIGHT: 74 IN | HEART RATE: 50 BPM | BODY MASS INDEX: 21.69 KG/M2 | WEIGHT: 169 LBS | DIASTOLIC BLOOD PRESSURE: 70 MMHG | SYSTOLIC BLOOD PRESSURE: 120 MMHG

## 2019-05-20 DIAGNOSIS — J38.3 VOCAL CORD LEUKOPLAKIA: Primary | ICD-10-CM

## 2019-05-20 DIAGNOSIS — T16.1XXA FOREIGN BODY OF RIGHT EAR, INITIAL ENCOUNTER: ICD-10-CM

## 2019-05-20 DIAGNOSIS — R01.1 SYSTOLIC MURMUR: ICD-10-CM

## 2019-05-20 DIAGNOSIS — Z72.0 TOBACCO ABUSE: ICD-10-CM

## 2019-05-20 DIAGNOSIS — R07.0 THROAT DISCOMFORT: ICD-10-CM

## 2019-05-20 DIAGNOSIS — T16.2XXA FOREIGN BODY OF LEFT EAR, INITIAL ENCOUNTER: ICD-10-CM

## 2019-05-20 DIAGNOSIS — R05.9 COUGH: ICD-10-CM

## 2019-05-20 LAB
ASCENDING AORTA: 3.33 CM
AV INDEX (PROSTH): 0.33
AV MEAN GRADIENT: 14.45 MMHG
AV PEAK GRADIENT: 26.21 MMHG
AV VALVE AREA: 1.3 CM2
AV VELOCITY RATIO: 0.32
BSA FOR ECHO PROCEDURE: 2 M2
CV ECHO LV RWT: 0.38 CM
DOP CALC AO PEAK VEL: 2.56 M/S
DOP CALC AO VTI: 65.18 CM
DOP CALC LVOT AREA: 3.94 CM2
DOP CALC LVOT DIAMETER: 2.24 CM
DOP CALC LVOT PEAK VEL: 0.82 M/S
DOP CALC LVOT STROKE VOLUME: 84.65 CM3
DOP CALCLVOT PEAK VEL VTI: 21.49 CM
E WAVE DECELERATION TIME: 331.15 MSEC
E/A RATIO: 0.82
E/E' RATIO: 6.82
ECHO LV POSTERIOR WALL: 1.12 CM (ref 0.6–1.1)
FRACTIONAL SHORTENING: 27 % (ref 28–44)
INTERVENTRICULAR SEPTUM: 1.06 CM (ref 0.6–1.1)
IVRT: 0.13 MSEC
LA MAJOR: 6.32 CM
LA MINOR: 5.92 CM
LA WIDTH: 3.57 CM
LEFT ATRIUM SIZE: 3.8 CM
LEFT ATRIUM VOLUME INDEX: 34.8 ML/M2
LEFT ATRIUM VOLUME: 70.49 CM3
LEFT INTERNAL DIMENSION IN SYSTOLE: 4.35 CM (ref 2.1–4)
LEFT VENTRICLE DIASTOLIC VOLUME INDEX: 86.66 ML/M2
LEFT VENTRICLE DIASTOLIC VOLUME: 175.31 ML
LEFT VENTRICLE MASS INDEX: 133.9 G/M2
LEFT VENTRICLE SYSTOLIC VOLUME INDEX: 42.3 ML/M2
LEFT VENTRICLE SYSTOLIC VOLUME: 85.52 ML
LEFT VENTRICULAR INTERNAL DIMENSION IN DIASTOLE: 5.93 CM (ref 3.5–6)
LEFT VENTRICULAR MASS: 270.9 G
LV LATERAL E/E' RATIO: 5.8
LV SEPTAL E/E' RATIO: 8.29
MV PEAK A VEL: 0.71 M/S
MV PEAK E VEL: 0.58 M/S
PISA TR MAX VEL: 3.01 M/S
PULM VEIN S/D RATIO: 1.3
PV PEAK D VEL: 0.6 M/S
PV PEAK S VEL: 0.78 M/S
RA MAJOR: 5.1 CM
RA PRESSURE: 3 MMHG
RA WIDTH: 4.19 CM
RIGHT VENTRICULAR END-DIASTOLIC DIMENSION: 3.31 CM
SINUS: 3.04 CM
STJ: 3.23 CM
TDI LATERAL: 0.1
TDI SEPTAL: 0.07
TDI: 0.09
TR MAX PG: 36.24 MMHG
TRICUSPID ANNULAR PLANE SYSTOLIC EXCURSION: 2.51 CM
TV REST PULMONARY ARTERY PRESSURE: 39 MMHG

## 2019-05-20 PROCEDURE — 99999 PR PBB SHADOW E&M-EST. PATIENT-LVL III: CPT | Mod: PBBFAC,,, | Performed by: NURSE PRACTITIONER

## 2019-05-20 PROCEDURE — 99999 PR PBB SHADOW E&M-EST. PATIENT-LVL III: ICD-10-PCS | Mod: PBBFAC,,, | Performed by: NURSE PRACTITIONER

## 2019-05-20 PROCEDURE — 1101F PR PT FALLS ASSESS DOC 0-1 FALLS W/OUT INJ PAST YR: ICD-10-PCS | Mod: CPTII,S$GLB,, | Performed by: NURSE PRACTITIONER

## 2019-05-20 PROCEDURE — 3074F PR MOST RECENT SYSTOLIC BLOOD PRESSURE < 130 MM HG: ICD-10-PCS | Mod: CPTII,S$GLB,, | Performed by: NURSE PRACTITIONER

## 2019-05-20 PROCEDURE — 99203 PR OFFICE/OUTPT VISIT, NEW, LEVL III, 30-44 MIN: ICD-10-PCS | Mod: 25,S$GLB,, | Performed by: NURSE PRACTITIONER

## 2019-05-20 PROCEDURE — 99999 PR PBB SHADOW E&M-EST. PATIENT-LVL II: ICD-10-PCS | Mod: PBBFAC,,,

## 2019-05-20 PROCEDURE — 99203 OFFICE O/P NEW LOW 30 MIN: CPT | Mod: 25,S$GLB,, | Performed by: NURSE PRACTITIONER

## 2019-05-20 PROCEDURE — 93306 TTE W/DOPPLER COMPLETE: CPT | Mod: S$GLB,,, | Performed by: INTERNAL MEDICINE

## 2019-05-20 PROCEDURE — 1101F PT FALLS ASSESS-DOCD LE1/YR: CPT | Mod: CPTII,S$GLB,, | Performed by: NURSE PRACTITIONER

## 2019-05-20 PROCEDURE — 69200 CLEAR OUTER EAR CANAL: CPT | Mod: 50,51,S$GLB, | Performed by: NURSE PRACTITIONER

## 2019-05-20 PROCEDURE — 3078F PR MOST RECENT DIASTOLIC BLOOD PRESSURE < 80 MM HG: ICD-10-PCS | Mod: CPTII,S$GLB,, | Performed by: NURSE PRACTITIONER

## 2019-05-20 PROCEDURE — 31575 DIAGNOSTIC LARYNGOSCOPY: CPT | Mod: S$GLB,,, | Performed by: NURSE PRACTITIONER

## 2019-05-20 PROCEDURE — 3074F SYST BP LT 130 MM HG: CPT | Mod: CPTII,S$GLB,, | Performed by: NURSE PRACTITIONER

## 2019-05-20 PROCEDURE — 3078F DIAST BP <80 MM HG: CPT | Mod: CPTII,S$GLB,, | Performed by: NURSE PRACTITIONER

## 2019-05-20 PROCEDURE — 93306 TRANSTHORACIC ECHO (TTE) COMPLETE (CUPID ONLY): ICD-10-PCS | Mod: S$GLB,,, | Performed by: INTERNAL MEDICINE

## 2019-05-20 PROCEDURE — 31575 PR LARYNGOSCOPY, FLEXIBLE; DIAGNOSTIC: ICD-10-PCS | Mod: S$GLB,,, | Performed by: NURSE PRACTITIONER

## 2019-05-20 PROCEDURE — 69200 PR REMV EXT CANAL FOREIGN BODY: ICD-10-PCS | Mod: 50,51,S$GLB, | Performed by: NURSE PRACTITIONER

## 2019-05-20 PROCEDURE — 99999 PR PBB SHADOW E&M-EST. PATIENT-LVL II: CPT | Mod: PBBFAC,,,

## 2019-05-20 NOTE — LETTER
May 20, 2019      Azam Nicole MD  [address]  [place] [address]           [facility]  [address] [facility] [address] [place] [address]  Phone: [phone]  Fax: [phone]          Patient: Dutch Olivier   MR Number: [id number]   YOB: 1945   Date of Visit: 5/20/2019       Dear Dr. Azam Nicole:    Thank you for referring Dutch Olivier to me for evaluation. Attached you will find relevant portions of my assessment and plan of care.    If you have questions, please do not hesitate to call me. I look forward to following Dutch Olivier along with you.    Sincerely,    Barbara Rodriguez NP    Enclosure  CC:  No Recipients    If you would like to receive this communication electronically, please contact [email or web address] or [phone] to request more information on [employer] access.    For providers and/or their staff who would like to refer a patient to [facility], please contact us through our one-stop-shop provider referral line, [facility], at [phone].    If you feel you have received this communication in error or would no longer like to receive these types of communications, please e-mail [email or web address]

## 2019-05-20 NOTE — PATIENT INSTRUCTIONS
Some of the Top Considerations for Recurrent Sore Throat:     1. Nasal allergies -- Typical constellation of symptoms seen with nasal allergies: itchy, red, watery eyes; itchy, red, watery nose; excessive sneezing; excessive stuffiness. Discuss with your primary care provider whether you should see an allergist or take daily allergy medications.     2. Silent reflux -- Typical constellation of symptoms seen with silent reflux: post-nasal drip sensation with absence of significant runny nose or nasal congestion, sensation of thick or too much mucus in the back of throat, raspy voice, frequent throat clearing, lump in the back of throat, frequent sore throats. Discuss with your primary care provider whether you should see a gastroenterologist or take daily reflux medications.     3. Sinus Infection -- Typical constellation of symptoms seen with acute bacterial sinus infection are:  Green-gold, foul-smelling, foul-tasting mucus from nose and throat, inability to breathe through nose, inability to smell or taste well, facial pain and swelling, dental pain, headaches around eyes, sore throat and productive cough. Sinus imaging may be needed to rule out infection if these symptoms are present.    4. Pharyngitis/Tonsillitis -- Typical constellation of symptoms seen with acute bacterial tonsillitis/pharyngitis are:  Smelly pus (exudate), very red inflamed throat, swollen lymph nodes, fever, general malaise, absence of cough. If these symptoms are present, you may need a throat swab.        Some of the Top Considerations for Chronic Cough:     1. Nasal allergies -- Typical constellation of symptoms seen with nasal allergies: itchy, red, watery eyes; itchy, red, watery nose; excessive sneezing; excessive stuffiness. If this one best describes your current state, then discuss with your primary care provider whether you should see an allergist or take daily allergy medications.     2. Sinus Infection -- Typical constellation of  symptoms seen with acute bacterial sinus infection are:  Green-gold, foul-smelling, foul-tasting mucus from nose and throat, inability to breathe through nose, inability to smell or taste well, facial pain and swelling, dental pain, headaches around eyes, sore throat and productive cough. If this one best describes your current state, then let's get sinus imaging to rule out infection.     3.  Silent reflux -- Typical constellation of symptoms seen with silent reflux: post-nasal drip sensation with absence of significant runny nose or nasal congestion, sensation of thick or too much mucus in the back of throat, raspy voice, frequent throat clearing, lump in the back of throat, frequent sore throats. If this one best describes your current state, discuss with your primary care provider whether you should see a gastroenterologist or take daily reflux medications. Your GI doctor may want to do an Upper GI or obtain a barium swallow or pH monitoring test.     4. Asthma/Pulmonary (Lung) issue -- Typical constellation of symptoms: wheezing, shortness of breath. Discuss with your primary care provider whether you should see a pulmonologist (lung specialist) and have Pulmonary Function Testing (PFTs) done.     5. Certain blood pressure medications known as ACE-inhibitors, such as lisinopril, can cause chronic cough. Though estimates vary in literature, 20% or more of patients taking lisinopril (or other ACE-inhibitor for blood pressure) will develop a chronic dry cough.     6. Post-Viral Cough Syndrome -- Occasionally a cough can persist for 4-8 weeks after an acute upper respiratory viral illness, due to hyperactive sensitivity of the airway nerves. A steroid inhaler and night-time cough suppressant can often help.     7. Pertussis -- Pertussis is a under-recognized cause of persistent cough in adults. A blood test can check for Bordetella.        Leukoplakia (small white patch) on your right vocal cord -- precancerous  "changes. Please consider stop smoking as soon as possible.  If you started smoking before 1988, you qualify for FREE smoking cessation program, including FREE medication, FREE group and individual counseling, and FREE quit-line coaching. This program has a 90% success rate! To enroll contact Becca New or Karla Eubanks, certified tobacco cessation specialists, at [facility].       How Acid Reflux Affects Your Throat    Do you have to clear your throat or cough often? Are you hoarse? Do you have trouble swallowing? If you have these or other throat symptoms, you may have acid reflux. This occurs when stomach acid flows back up and irritates your throat.  Why you have throat symptoms  There are muscles (esophageal sphincters) at both ends of the tube that carries food to your stomach (the esophagus). These muscles relax to let food pass. Then they tighten to keep stomach acid down. When the lower esophageal sphincter (LES) doesnt tighten enough, acid can flow back (reflux) from your stomach into your esophagus. This may cause heartburn. In some cases the upper esophageal sphincter (UES) also doesnt work well. Then acid can travel higher and enter your throat (pharynx). In many cases, this causes throat symptoms.  Common throat symptoms  · Need to clear your throat often  · Feeling like youre choking  · Long-term (chronic) cough  · Hoarseness  · Trouble swallowing  · Feel like you have a lump in your throat  · Sour or acid taste  · Sore throat that keeps coming back     LARYNGOPHARYNGEAL REFLUX  (SILENT OR ATYPICAL REFLUX)    If you have any of the following symptoms you may have laryngopharyngeal reflux (LPR):  hoarseness, thick or too much mucus, chronic throat pain/irritation, chronic throat clearing, chronic cough, especially cough that wake you up from sleep, chronic "postnasal drip" without the need to blow your nose.     Many people with LPR do not have symptoms of heartburn. Compared to the esophagus, the " "voice box and the back of the throat are significantly more sensitive to the effects of acid on surrounding tissue. Acid passing quickly through the esophagus does not have a chance to irritate the area for too long.  However acid that pools in the throat or voice box can cause prolonged irritation resulting in the symptoms of LPR. In patients known to have LPR, 71% reported hoarseness, 51% reported chronic cough, 47% reported sensation of thickness or lump in the back of the throat, 42% reported chronic throat clearing, and 35% reported trouble swallowing.     Another major symptom of LPR is "postnasal drip."  Patients are often told symptoms are due to abnormal nasal drainage or sinus infection; however this is rarely the cause of chronic throat irritation. For post nasal drip to cause the complaints described, signs and symptoms of an active nasal infection should be present.     Treatments for LPR include:  postural changes, weight reduction, diet modification, medication to reduce stomach acid and promote normal motility, and surgery to prevent reflux. Most patients will begin to notice some relief in her symptoms about 2 weeks after starting the medication; however it is generally recommended the medication should be continued for 2 months. If the symptoms completely resolve, the medication can then be tapered.  Some people will remain symptom free while others may have relapses which required treatment again.    Things you can do to prevent reflux include:  Do not smoke.  Smoking will cause reflux.  Avoid tight fitting clothes or belts around the waist.  Avoid vigorous exercise at least 2 hours before bedtime. Avoid eating at least 2 hours prior to bedtime.  In fact avoid eating your largest meal at night.  Weight loss.  For patient's with recent weight gain, shedding a few pounds is all that is required to improve reflux.  Avoid caffeine, cola beverages, citrus beverages, mints, alcoholic beverages, " "particularly at night, cheese, fried foods, spicy foods, eggs, and chocolate.  Sleep with the head of bed elevated at least 6 inches ("MedCline" wedge pillow).    Recommendations:    Take Nexium or Prilosec (PPI) every morning on an empty stomach (30-60 minutes before eating) 40 MG.   At bedtime take Zantac (H2-blocker) 300 mg.    After 4-8 weeks, with significant symptomatic improvement, you may begin weaning your reflux medications down:  Nexium or Prilosec 40 mg --> to 20 mg (over-the-counter strength).  Zantac 300 mg --> to 150 mg (over-the-counter stength).  Then continue to wean as symptoms allow.    See a Gastro doctor (GI) for refractory symptoms and continued management.    "

## 2019-05-20 NOTE — PROGRESS NOTES
Subjective:       Patient ID: Dutch Olivier is a 73 y.o. male.    Chief Complaint: Sore Throat and Cough    HPI   Patient is new to ENT, referred by Dr. Nicole for consultation for throat pain in the setting of tobacco abuse. Patient reports sore throat X 1 month, started off more on the left, but now more in the middle. He suspected allergies. He states it is much improved, almost gone. He has been a smoker X 50+ years. He denies dysphagia, odynophagia, voice change. He experienced weight loss during chemotherapy treatments for melanoma; has yet to gain back what was lost.     Review of Systems   Constitutional: Negative.    HENT: Positive for rhinorrhea and sore throat.    Eyes: Negative.    Respiratory: Positive for cough.    Cardiovascular: Negative.    Gastrointestinal: Negative.    Musculoskeletal: Negative.    Skin: Negative.    Neurological: Negative.    Hematological: Negative.    Psychiatric/Behavioral: Negative.        Objective:      Physical Exam   Constitutional: He is oriented to person, place, and time. Vital signs are normal. He appears well-developed and well-nourished. He is cooperative. He does not appear ill. No distress.   HENT:   Head: Normocephalic and atraumatic.   Right Ear: Hearing, tympanic membrane, external ear and ear canal normal. Tympanic membrane is not erythematous. No middle ear effusion.   Left Ear: Hearing, tympanic membrane, external ear and ear canal normal. Tympanic membrane is not erythematous.  No middle ear effusion.   Nose: Nose normal. No mucosal edema or rhinorrhea. Right sinus exhibits no maxillary sinus tenderness and no frontal sinus tenderness. Left sinus exhibits no maxillary sinus tenderness and no frontal sinus tenderness.   Mouth/Throat: Uvula is midline, oropharynx is clear and moist and mucous membranes are normal. Mucous membranes are not pale, not dry and not cyanotic. No oral lesions. No oropharyngeal exudate, posterior oropharyngeal edema or posterior  oropharyngeal erythema.   SEPARATE PROCEDURE IN OFFICE:   Procedure: Removal of foreign body, BILATERAL  Pre Procedure Diagnosis: Foreign body of ear canal  Post Procedure Diagnosis: Foreign body of ear canal  Verbal informed consent in regards to risk of trauma to ear canal, ear drum or hearing, discomfort during procedure and/or inability to remove cerumen impaction in one session or unforeseen events or complications.   No anesthesia.     Procedure in detail:   Ear canal visualized bilateral with appropriate size ear speculum utilizing Operating Head Binocular Otomicroscope   Utilizing the following: delicate alligator forceps were used. Copious long cylindrical hair-like structures were removed atraumatically. The TM and EAC were then inspected and found to be clear of wax. See description of TMs/EACs in PE above.   Complications: No   Condition: Improved/Good     Eyes: Pupils are equal, round, and reactive to light. Conjunctivae, EOM and lids are normal. Right eye exhibits no discharge. Left eye exhibits no discharge. No scleral icterus.   Neck: Trachea normal and normal range of motion. Neck supple. No tracheal deviation present. No thyroid mass and no thyromegaly present.   Cardiovascular: Normal rate.   Pulmonary/Chest: Effort normal. No stridor. No respiratory distress. He has no wheezes.   Musculoskeletal: Normal range of motion.   Lymphadenopathy:        Head (right side): No submental, no submandibular, no tonsillar, no preauricular and no posterior auricular adenopathy present.        Head (left side): No submental, no submandibular, no tonsillar, no preauricular and no posterior auricular adenopathy present.     He has no cervical adenopathy.        Right cervical: No superficial cervical and no posterior cervical adenopathy present.       Left cervical: No superficial cervical and no posterior cervical adenopathy present.   Neurological: He is alert and oriented to person, place, and time. He has  normal strength. Coordination and gait normal.   Skin: Skin is warm, dry and intact. No lesion and no rash noted. He is not diaphoretic. No cyanosis. No pallor.   Psychiatric: He has a normal mood and affect. His speech is normal and behavior is normal. Judgment and thought content normal. Cognition and memory are normal.   Nursing note and vitals reviewed.      Procedure: Flexible laryngoscopy    In order to fully examine the upper aerodigestive tract, including the larynx, in a patient with a hyperactive gag reflex, and suboptimal visualization with indirect mirror exam,  flexible endoscopy is required.   After explaining the procedure and obtaining verbal consent, a timeout was performed with the patient's participation according to the universal protocol. Both nasal cavities were anesthetized with 4% Xylocaine spray mixed with Cesar-Synephrine. The flexible laryngoscope  was inserted into the nasal cavity and advanced to visualize the nasal cavity, nasopharynx, the posterior oropharynx, hypopharynx, and the endolarynx with the  findings noted. The scope was removed and the procedure terminated. The patient tolerated this procedure well without apparent complication.     OVERALL FINDINGS  Nasopharynx - the torus is clear. There are no lesions of the posterior wall.   Oropharynx - no lesions of the tongue base. There is no obvious fullness or asymmetry.  Hypopharynx - there are no lesions of the pyriform sinuses or postcricoid region   Larynx - there are no lesions of the supraglottic or glottic larynx.  Vocal fold mobility is normal.     SPECIFIC FINDINGS  Adenoid tissue - normal   Nasopharynx & eustachian tube orifices - normal   Posterior pharyngeal wall - normal   Base of tongue - normal   Epiglottis - normal   Valleculae - normal   Pyriform sinuses - normal   False vocal cords - normal   True vocal cords - 2 tiny spots of leukoplakia right midTVC medial edge  Arytenoids - normal   Interarytenoid space -  erythema, edema   Posterior nasopharyngeal wall    Right BOT    Left BOT    Larynx (two tiny spots of leukoplakia on medial edge of mid right TVC)    Larynx (two tiny spots of leukoplakia on medial edge of mid right TVC)  Larynx    Assessment:     Leukoplakia right mid TVC medial edge    Tobacco abuse  LPRD  Removal of foreign bodies bilateral EAC  Plan:     Counseled to stop smoking. [facility]     Discussed leukoplakia is precancerous. Recheck in 2 months.   Advised/Cautioned: The results of today's ENT exam and flexible endoscopy were detailed to the patient and her questions were answered. Patient education centered around GERD, known exacerbants and contemporary treatment options. Laryngoscope photos were given to the patient. Handouts given on LPRD and GERD were given to the patient. After review of these, patient elected to take OTC PPI QAM on an empty stomach for the next 6-8 weeks, and H2-blocker QHS. I encouraged the patient once he has completed the evening meal to not snack or consume any other food products or caffeinated beverages for at least  minutes before retiring. Finally, I encouraged the patient to sleep about 30 degrees above horizontal, and this can be facilitated by using 2-3 pillows or a wedge foam product. If the patient is not demonstrably improved in 6-8 weeks, consultation with gastroenterology may be indicated to rule out intrinsic disease in the lower esophagus, stomach, or proximal duodenum.

## 2019-07-22 ENCOUNTER — OFFICE VISIT (OUTPATIENT)
Dept: OTOLARYNGOLOGY | Facility: CLINIC | Age: 74
End: 2019-07-22
Payer: MEDICARE

## 2019-07-22 ENCOUNTER — TELEPHONE (OUTPATIENT)
Dept: OTOLARYNGOLOGY | Facility: CLINIC | Age: 74
End: 2019-07-22

## 2019-07-22 VITALS — BODY MASS INDEX: 21.53 KG/M2 | HEIGHT: 74 IN | WEIGHT: 167.75 LBS

## 2019-07-22 DIAGNOSIS — Z72.0 TOBACCO ABUSE: ICD-10-CM

## 2019-07-22 DIAGNOSIS — J38.3 VOCAL CORD LEUKOPLAKIA: Primary | ICD-10-CM

## 2019-07-22 PROCEDURE — 1101F PR PT FALLS ASSESS DOC 0-1 FALLS W/OUT INJ PAST YR: ICD-10-PCS | Mod: CPTII,S$GLB,, | Performed by: OTOLARYNGOLOGY

## 2019-07-22 PROCEDURE — 31575 PR LARYNGOSCOPY, FLEXIBLE; DIAGNOSTIC: ICD-10-PCS | Mod: S$GLB,,, | Performed by: OTOLARYNGOLOGY

## 2019-07-22 PROCEDURE — 31575 DIAGNOSTIC LARYNGOSCOPY: CPT | Mod: S$GLB,,, | Performed by: OTOLARYNGOLOGY

## 2019-07-22 PROCEDURE — 99213 PR OFFICE/OUTPT VISIT, EST, LEVL III, 20-29 MIN: ICD-10-PCS | Mod: 25,S$GLB,, | Performed by: OTOLARYNGOLOGY

## 2019-07-22 PROCEDURE — 99999 PR PBB SHADOW E&M-EST. PATIENT-LVL II: ICD-10-PCS | Mod: PBBFAC,,, | Performed by: OTOLARYNGOLOGY

## 2019-07-22 PROCEDURE — 99999 PR PBB SHADOW E&M-EST. PATIENT-LVL II: CPT | Mod: PBBFAC,,, | Performed by: OTOLARYNGOLOGY

## 2019-07-22 PROCEDURE — 1101F PT FALLS ASSESS-DOCD LE1/YR: CPT | Mod: CPTII,S$GLB,, | Performed by: OTOLARYNGOLOGY

## 2019-07-22 PROCEDURE — 99213 OFFICE O/P EST LOW 20 MIN: CPT | Mod: 25,S$GLB,, | Performed by: OTOLARYNGOLOGY

## 2019-07-22 NOTE — PROGRESS NOTES
Subjective:       Patient ID: Dutch Olivier is a 73 y.o. male.    Chief Complaint: Follow-up    Dutch HINSON is here for follow-up of leukoplakia of vocal cords, last seen by MB 5/20/19. Voice doing well overall - a little scratchy / irritation at time. No dysphagia, no odynophagia. No unintended weight loss. No otalgia or throat pain.     Tobacco: 50 pk yr tobacco  Med HxL ear melanoma, COPD, Htn, Prostate cancer    Review of Systems   Constitutional: Negative for activity change and appetite change.   Respiratory: Negative for difficulty breathing and wheezing   Cardiovascular: Negative for chest pain.      Objective:        Constitutional:   Vital signs are normal. He appears well-developed and well-nourished.     Head:  Normocephalic and atraumatic.     Ears:  Hearing normal to normal and whispered voice; external ear normal without scars, lesions, or masses; ear canal, tympanic membrane, and middle ear normal..     Nose:  Nose normal including turbinates, nasal mucosa, sinuses and nasal septum.     Mouth/Throat  Oropharynx clear and moist without lesions or asymmetry.   Strong gag,  Laryngoscopy indicated due to surveillance of previous lesion and tobacco hx      Neck:  Neck normal without thyromegaly masses, asymmetry, normal tracheal structure, crepitus, and tenderness.         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 A diagnosis of Tobacco abuse was also pertinent to this visit.     Post-procedure diagnosis: same    Procedure: Flexible fiberoptic laryngoscopy    Surgeon: Mg Sepulveda MD    Anesthesia: 2% Lidocaine with Phenylephrine topical    Risks, benefits, and alternatives of the procedure were discussed with the patient, and the patient consented to the fiberoptic examination.  We applied a topical nasal decongestant and analgesic.  After adequate anesthesia was obtained, the flexible fiberoptic scope was passed through the right. The  entire pharynx (nasopharynx to hypopharynx) and the larynx were visualized. At the end of the examination, the scope was removed. The patient tolerated the procedure well with no complications.     Findings:  -     Laryngeal mucosa is normal  -     Post-cricoid region: normal  -     Lingual tonsils have mild hypertrophy  -     Adenoids have no  hypertrophy  -     Right vocal fold: normal mobility     mass/lesion: stable sessile leukoplakic lesion of superior surface of mid cord, near medial edge  -     Left vocal fold: normal mobility     mass/lesion: none  -     Other findings: none      Assessment:       1. Vocal cord leukoplakia    2. Tobacco abuse          Plan:       Lesion is stable  Surveillance of area in 6 months  Discussed tobacco cessation  Fu sooner prn

## 2019-08-23 ENCOUNTER — HOSPITAL ENCOUNTER (OUTPATIENT)
Dept: RADIOLOGY | Facility: HOSPITAL | Age: 74
Discharge: HOME | End: 2019-08-23
Attending: NURSE PRACTITIONER
Payer: MEDICARE

## 2019-08-23 DIAGNOSIS — Z85.820 HISTORY OF MALIGNANT MELANOMA: ICD-10-CM

## 2019-08-23 PROCEDURE — 71046 X-RAY EXAM CHEST 2 VIEWS: CPT | Mod: 26,,, | Performed by: RADIOLOGY

## 2019-08-23 PROCEDURE — 71046 XR CHEST PA AND LATERAL: ICD-10-PCS | Mod: 26,,, | Performed by: RADIOLOGY

## 2019-08-23 PROCEDURE — 71046 X-RAY EXAM CHEST 2 VIEWS: CPT | Mod: TC,FY,PO

## 2019-08-27 ENCOUNTER — OFFICE VISIT (OUTPATIENT)
Dept: HEMATOLOGY/ONCOLOGY | Facility: CLINIC | Age: 74
End: 2019-08-27
Payer: MEDICARE

## 2019-08-27 VITALS
SYSTOLIC BLOOD PRESSURE: 126 MMHG | HEART RATE: 65 BPM | HEIGHT: 74 IN | WEIGHT: 169.06 LBS | OXYGEN SATURATION: 96 % | TEMPERATURE: 98 F | DIASTOLIC BLOOD PRESSURE: 66 MMHG | RESPIRATION RATE: 18 BRPM | BODY MASS INDEX: 21.7 KG/M2

## 2019-08-27 DIAGNOSIS — Z85.820 HISTORY OF MALIGNANT MELANOMA: Primary | ICD-10-CM

## 2019-08-27 DIAGNOSIS — D75.839 THROMBOCYTOSIS: ICD-10-CM

## 2019-08-27 PROCEDURE — 99213 OFFICE O/P EST LOW 20 MIN: CPT | Mod: S$GLB,,, | Performed by: NURSE PRACTITIONER

## 2019-08-27 PROCEDURE — 99499 UNLISTED E&M SERVICE: CPT | Mod: S$GLB,,, | Performed by: NURSE PRACTITIONER

## 2019-08-27 PROCEDURE — 99213 PR OFFICE/OUTPT VISIT, EST, LEVL III, 20-29 MIN: ICD-10-PCS | Mod: S$GLB,,, | Performed by: NURSE PRACTITIONER

## 2019-08-27 PROCEDURE — 3074F PR MOST RECENT SYSTOLIC BLOOD PRESSURE < 130 MM HG: ICD-10-PCS | Mod: CPTII,S$GLB,, | Performed by: NURSE PRACTITIONER

## 2019-08-27 PROCEDURE — 3074F SYST BP LT 130 MM HG: CPT | Mod: CPTII,S$GLB,, | Performed by: NURSE PRACTITIONER

## 2019-08-27 PROCEDURE — 99999 PR PBB SHADOW E&M-EST. PATIENT-LVL III: CPT | Mod: PBBFAC,,, | Performed by: NURSE PRACTITIONER

## 2019-08-27 PROCEDURE — 3078F DIAST BP <80 MM HG: CPT | Mod: CPTII,S$GLB,, | Performed by: NURSE PRACTITIONER

## 2019-08-27 PROCEDURE — 1101F PT FALLS ASSESS-DOCD LE1/YR: CPT | Mod: CPTII,S$GLB,, | Performed by: NURSE PRACTITIONER

## 2019-08-27 PROCEDURE — 1101F PR PT FALLS ASSESS DOC 0-1 FALLS W/OUT INJ PAST YR: ICD-10-PCS | Mod: CPTII,S$GLB,, | Performed by: NURSE PRACTITIONER

## 2019-08-27 PROCEDURE — 99999 PR PBB SHADOW E&M-EST. PATIENT-LVL III: ICD-10-PCS | Mod: PBBFAC,,, | Performed by: NURSE PRACTITIONER

## 2019-08-27 PROCEDURE — 99499 RISK ADDL DX/OHS AUDIT: ICD-10-PCS | Mod: S$GLB,,, | Performed by: NURSE PRACTITIONER

## 2019-08-27 PROCEDURE — 3078F PR MOST RECENT DIASTOLIC BLOOD PRESSURE < 80 MM HG: ICD-10-PCS | Mod: CPTII,S$GLB,, | Performed by: NURSE PRACTITIONER

## 2019-08-27 NOTE — PROGRESS NOTES
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    He is status post surgical resection and nine of a planned 12-month adjuvant alpha   interferon-2 beta.      He presents to the clinic today for his 96-month post-therapy evaluation from resection.    He continues to follow with Dr. Lenin Myles, Dermatology, every six months with few   keratoses.  He follows closed with Dr. Nicole.  He was discovered to have a "mild"   murmur and evaluated.  He denies any difficulties with fevers, chills, drenching night   sweats, unexplained weight loss, painful lymphadenopathy, rashes, pruritus, abdominal   discomfort, nausea, vomiting, constipation, diarrhea, bleeding, etc.  He is requesting   to be followed on an as needed basis as he is consistent with [facility] [employer] [facility].  No   other new complaints or pertinent findings on a 10-point review of systems.    PHYSICAL EXAMINATION:  GENERAL:  Well-developed, well-nourished white gentleman in no acute distress.    Alert and oriented x3.  VITAL SIGNS:  Weight:  Gain of 7 pounds in 1 year  Wt Readings from Last 3 Encounters:   08/27/19 76.7 kg (169 lb 1.5 oz)   07/22/19 76.1 kg (167 lb 12.3 oz)   05/20/19 76.7 kg (169 lb 1.5 oz)     Temp Readings from Last 3 Encounters:   08/27/19 98.3 °F (36.8 °C) (Oral)   05/09/19 99.1 °F (37.3 °C) (Oral)   08/27/18 98.3 °F (36.8 °C)     BP Readings from Last 3 Encounters:   08/27/19 126/66   05/20/19 120/70   05/09/19 118/68     Pulse Readings from Last 3 Encounters:   08/27/19 65   05/20/19 (!) 50   05/09/19 60     HEENT:  Normocephalic, atraumatic.  Oral mucosa pink and moist.  Lips without   lesions.  Tongue midline.  Oropharynx clear.  Nonicteric sclerae.  NECK:  Supple.  No adenopathy.  HEART:  Regular rate and rhythm without murmur, gallop or rub.  LUNGS:  Clear to auscultation bilaterally.  NL respiratory effort.  ABDOMEN:  Soft, " nontender and nondistended with positive normoactive bowel   sounds.  No hepatosplenomegaly.  EXTREMITIES:  No cyanosis, clubbing or edema.  Distal pulses are intact.     AXILLAE AND GROIN:  No palpable pathologic adenopathy appreciated.  SKIN:  No signs of local reoccurrence about the patient's reconstructed right ear.  Nailbeds rigid, pale.    LABORATORY DATA:    Lab Results   Component Value Date    WBC 7.10 08/23/2019    RBC 4.98 08/23/2019    HGB 16.0 08/23/2019    HCT 46.6 08/23/2019    MCV 94 08/23/2019    MCH 32.1 (H) 08/23/2019    MCHC 34.3 08/23/2019    RDW 13.0 08/23/2019     (H) 08/23/2019    MPV 9.0 (L) 08/23/2019    GRAN 3.3 08/23/2019    GRAN 46.9 08/23/2019    LYMPH 2.9 08/23/2019    LYMPH 40.4 08/23/2019    MONO 0.6 08/23/2019    MONO 8.6 08/23/2019    EOS 0.2 08/23/2019    BASO 0.05 08/23/2019    EOSINOPHIL 3.4 08/23/2019    BASOPHIL 0.7 08/23/2019     CMP  Sodium   Date Value Ref Range Status   08/23/2019 142 136 - 145 mmol/L Final     Potassium   Date Value Ref Range Status   08/23/2019 4.7 3.5 - 5.1 mmol/L Final     Chloride   Date Value Ref Range Status   08/23/2019 105 95 - 110 mmol/L Final     CO2   Date Value Ref Range Status   08/23/2019 28 23 - 29 mmol/L Final     Glucose   Date Value Ref Range Status   08/23/2019 100 70 - 110 mg/dL Final     BUN, Bld   Date Value Ref Range Status   08/23/2019 11 8 - 23 mg/dL Final     Creatinine   Date Value Ref Range Status   08/23/2019 0.8 0.5 - 1.4 mg/dL Final     Calcium   Date Value Ref Range Status   08/23/2019 9.8 8.7 - 10.5 mg/dL Final     Total Protein   Date Value Ref Range Status   08/23/2019 7.2 6.0 - 8.4 g/dL Final     Albumin   Date Value Ref Range Status   08/23/2019 4.1 3.5 - 5.2 g/dL Final     Total Bilirubin   Date Value Ref Range Status   08/23/2019 0.9 0.1 - 1.0 mg/dL Final     Comment:     For infants and newborns, interpretation of results should be based  on gestational age, weight and in agreement with  clinical  observations.  Premature Infant recommended reference ranges:  Up to 24 hours.............<8.0 mg/dL  Up to 48 hours............<12.0 mg/dL  3-5 days..................<15.0 mg/dL  6-29 days.................<15.0 mg/dL       Alkaline Phosphatase   Date Value Ref Range Status   08/23/2019 86 55 - 135 U/L Final     AST   Date Value Ref Range Status   08/23/2019 15 10 - 40 U/L Final     ALT   Date Value Ref Range Status   08/23/2019 16 10 - 44 U/L Final     Anion Gap   Date Value Ref Range Status   08/23/2019 9 8 - 16 mmol/L Final     eGFR if    Date Value Ref Range Status   08/23/2019 >60 >60 mL/min/1.73 m^2 Final     eGFR if non    Date Value Ref Range Status   08/23/2019 >60 >60 mL/min/1.73 m^2 Final     Comment:     Calculation used to obtain the estimated glomerular filtration  rate (eGFR) is the CKD-EPI equation.            RADIOLOGY:  Chest x-ray dated 08/23/2019.  Impression:  No acute process.  No significant change.    IMPRESSION:  1.  Stage II-C malignant melanoma arising in the right ear - MARCOS.  2.  History of resected prostate carcinoma - recent PSA = 0.04  3.  Tobacco abuse - not interested in stopping  4.  COPD - follow with Dr. Nicole  5.  HTN - on Benazepril, follows with Dr. Nicole (last appt 05/09/2019)  6.  Thrombocytosis - mild    PLAN:  1.  Follow up prn per patient's request.  2.  Continue to follow with Dermatology (Dr. Lenin Myles) every six months & Dr. Nicole.      Assessment/plan reviewed and approved by Dr. Esquivel.

## 2020-05-06 ENCOUNTER — PATIENT MESSAGE (OUTPATIENT)
Dept: ADMINISTRATIVE | Facility: HOSPITAL | Age: 75
End: 2020-05-06

## 2020-05-06 DIAGNOSIS — D07.5 CARCINOMA IN SITU OF PROSTATE: ICD-10-CM

## 2020-05-06 DIAGNOSIS — Z86.002 HISTORY OF CARCINOMA IN SITU OF PROSTATE: ICD-10-CM

## 2020-05-06 DIAGNOSIS — I10 ESSENTIAL HYPERTENSION: Primary | ICD-10-CM

## 2020-05-06 RX ORDER — BENAZEPRIL HYDROCHLORIDE 20 MG/1
TABLET ORAL
Qty: 180 TABLET | Refills: 0 | Status: SHIPPED | OUTPATIENT
Start: 2020-05-06 | End: 2020-08-11

## 2020-06-11 ENCOUNTER — TELEPHONE (OUTPATIENT)
Dept: FAMILY MEDICINE | Facility: CLINIC | Age: 75
End: 2020-06-11

## 2020-06-11 DIAGNOSIS — J44.9 CHRONIC OBSTRUCTIVE PULMONARY DISEASE, UNSPECIFIED COPD TYPE: Primary | ICD-10-CM

## 2020-06-11 NOTE — TELEPHONE ENCOUNTER
----- Message from Ivette Beth sent at 6/11/2020 10:38 AM CDT -----  Type: Needs Medical Advice  Who Called:  Patient   Best Call Back Number: [phone]  Additional Information: scheduled on 07/09/2020 patient states he usually has a chest xray before his appointment, need order,  please contact to advise.

## 2020-06-25 ENCOUNTER — PATIENT OUTREACH (OUTPATIENT)
Dept: ADMINISTRATIVE | Facility: HOSPITAL | Age: 75
End: 2020-06-25

## 2020-06-25 NOTE — LETTER
AUTHORIZATION FOR RELEASE OF   CONFIDENTIAL INFORMATION    Dear Zach Elizalde Jr., MD,    We are seeing Dutch Olivier, date of birth 1945, in the clinic at [facility]. Azam Nicole MD is the patient's PCP. Dutch Olivier has an outstanding lab/procedure at the time we reviewed his chart. In order to help keep his health information updated, he has authorized us to request the following medical record(s):        COLONOSCOPY              Please fax records to [facility], Daniel K Jens, MD, [phone]     If you have any questions, please contact  Elisa Sykes, Care Coordinator  [facility]  Phone: [phone]  FAX: [phone]          Patient Name: Dutch Olivier  : 1945  Patient Phone #: [phone]

## 2020-06-25 NOTE — PROGRESS NOTES
Chart review completed 06/25/2020.  Care Everywhere updates requested and reviewed.  Immunizations reconciled. Media reviewed.      updated with external colonoscopy report.       Health Maintenance Due   Topic Date Due    Shingles Vaccine (2 of 3) 01/24/2012    TETANUS VACCINE  09/26/2015    Lipid Panel  05/09/2020

## 2020-07-07 ENCOUNTER — OFFICE VISIT (OUTPATIENT)
Dept: OTOLARYNGOLOGY | Facility: CLINIC | Age: 75
End: 2020-07-07
Payer: MEDICARE

## 2020-07-07 ENCOUNTER — HOSPITAL ENCOUNTER (OUTPATIENT)
Dept: RADIOLOGY | Facility: HOSPITAL | Age: 75
Discharge: HOME | End: 2020-07-07
Attending: FAMILY MEDICINE
Payer: MEDICARE

## 2020-07-07 VITALS — WEIGHT: 176.56 LBS | BODY MASS INDEX: 22.66 KG/M2 | HEIGHT: 74 IN

## 2020-07-07 DIAGNOSIS — Z72.0 TOBACCO ABUSE: ICD-10-CM

## 2020-07-07 DIAGNOSIS — J44.9 CHRONIC OBSTRUCTIVE PULMONARY DISEASE, UNSPECIFIED COPD TYPE: ICD-10-CM

## 2020-07-07 DIAGNOSIS — J38.3 VOCAL CORD LEUKOPLAKIA: Primary | ICD-10-CM

## 2020-07-07 PROCEDURE — 99999 PR PBB SHADOW E&M-EST. PATIENT-LVL III: ICD-10-PCS | Mod: PBBFAC,,, | Performed by: OTOLARYNGOLOGY

## 2020-07-07 PROCEDURE — 1159F PR MEDICATION LIST DOCUMENTED IN MEDICAL RECORD: ICD-10-PCS | Mod: S$GLB,,, | Performed by: OTOLARYNGOLOGY

## 2020-07-07 PROCEDURE — 99213 OFFICE O/P EST LOW 20 MIN: CPT | Mod: 25,S$GLB,, | Performed by: OTOLARYNGOLOGY

## 2020-07-07 PROCEDURE — 1159F MED LIST DOCD IN RCRD: CPT | Mod: S$GLB,,, | Performed by: OTOLARYNGOLOGY

## 2020-07-07 PROCEDURE — 1126F AMNT PAIN NOTED NONE PRSNT: CPT | Mod: S$GLB,,, | Performed by: OTOLARYNGOLOGY

## 2020-07-07 PROCEDURE — 1126F PR PAIN SEVERITY QUANTIFIED, NO PAIN PRESENT: ICD-10-PCS | Mod: S$GLB,,, | Performed by: OTOLARYNGOLOGY

## 2020-07-07 PROCEDURE — 1101F PR PT FALLS ASSESS DOC 0-1 FALLS W/OUT INJ PAST YR: ICD-10-PCS | Mod: CPTII,S$GLB,, | Performed by: OTOLARYNGOLOGY

## 2020-07-07 PROCEDURE — 99999 PR PBB SHADOW E&M-EST. PATIENT-LVL III: CPT | Mod: PBBFAC,,, | Performed by: OTOLARYNGOLOGY

## 2020-07-07 PROCEDURE — 1101F PT FALLS ASSESS-DOCD LE1/YR: CPT | Mod: CPTII,S$GLB,, | Performed by: OTOLARYNGOLOGY

## 2020-07-07 PROCEDURE — 99213 PR OFFICE/OUTPT VISIT, EST, LEVL III, 20-29 MIN: ICD-10-PCS | Mod: 25,S$GLB,, | Performed by: OTOLARYNGOLOGY

## 2020-07-07 PROCEDURE — 31575 PR LARYNGOSCOPY, FLEXIBLE; DIAGNOSTIC: ICD-10-PCS | Mod: S$GLB,,, | Performed by: OTOLARYNGOLOGY

## 2020-07-07 PROCEDURE — 71046 XR CHEST PA AND LATERAL: ICD-10-PCS | Mod: 26,,, | Performed by: RADIOLOGY

## 2020-07-07 PROCEDURE — 71046 X-RAY EXAM CHEST 2 VIEWS: CPT | Mod: 26,,, | Performed by: RADIOLOGY

## 2020-07-07 PROCEDURE — 71046 X-RAY EXAM CHEST 2 VIEWS: CPT | Mod: TC,FY,PO

## 2020-07-07 PROCEDURE — 31575 DIAGNOSTIC LARYNGOSCOPY: CPT | Mod: S$GLB,,, | Performed by: OTOLARYNGOLOGY

## 2020-07-07 NOTE — PROGRESS NOTES
Subjective:       Patient ID: Dutch Olivier is a 74 y.o. male.    Chief Complaint: Follow-up    Dutch HINSON is here for follow-up of leukoplakia of vocal cord. Voice doing well overall - a little scratchy / irritation at time. No dysphagia, no odynophagia. No unintended weight loss. No otalgia or throat pain.     Tobacco: 50 pk yr tobacco, still smoking.  Med HxL ear melanoma, COPD, Htn, Prostate cancer    Review of Systems   Constitutional: Negative for activity change and appetite change.   Respiratory: Negative for difficulty breathing and wheezing   Cardiovascular: Negative for chest pain.      Objective:        Constitutional:   Vital signs are normal. He appears well-developed and well-nourished.     Head:  Normocephalic and atraumatic.     Ears:  Hearing normal to normal and whispered voice; external ear normal without scars, lesions, or masses; ear canal, tympanic membrane, and middle ear normal..     Nose:  Nose normal including turbinates, nasal mucosa, sinuses and nasal septum.     Mouth/Throat  Oropharynx clear and moist without lesions or asymmetry.   Strong gag,  Laryngoscopy indicated due to surveillance of previous lesion and tobacco hx      Neck:  Neck normal without thyromegaly masses, asymmetry, normal tracheal structure, crepitus, and tenderness.         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 A diagnosis of Tobacco abuse was also pertinent to this visit.     Post-procedure diagnosis: same    Procedure: Flexible fiberoptic laryngoscopy    Surgeon: Mg Sepulveda MD    Anesthesia: 2% Lidocaine with Phenylephrine topical    Risks, benefits, and alternatives of the procedure were discussed with the patient, and the patient consented to the fiberoptic examination.  We applied a topical nasal decongestant and analgesic.  After adequate anesthesia was obtained, the flexible fiberoptic scope was passed through the right. The entire pharynx  (nasopharynx to hypopharynx) and the larynx were visualized. At the end of the examination, the scope was removed. The patient tolerated the procedure well with no complications.     Findings:  -     Laryngeal mucosa is normal  -     Post-cricoid region: normal  -     Lingual tonsils have mild hypertrophy  -     Adenoids have no  hypertrophy  -     Right vocal fold: normal mobility     mass/lesion: stable sessile leukoplakic lesion of superior surface of mid cord, near medial edge  -     Left vocal fold: normal mobility     mass/lesion: none  -     Other findings: none      Assessment:       1. Vocal cord leukoplakia    2. Tobacco abuse          Plan:       Lesion is stable  Surveillance of area in 1 yr  Discussed tobacco cessation  Fu sooner prn

## 2020-07-07 NOTE — PATIENT INSTRUCTIONS
"I will see you in 1 year    Gaviscon    Gaviscon is made up of sodium alginate. This substance, when swallowed, forms a barrier or "raft" on the surface of the stomach contents to prevent reflux of stomach contents into the esophagus or throat. It can be an effective way to manage heartburn or gastroesophageal reflux (GERD.) The benefit is also that it is minimally absorbed into the rest of the body, so you do not have to worry about side effects outside of the stomach. This can be taken long term without any issues as long as you tolerate it well.     Two types of Gaviscon are available:  Gaviscon Original can be purchased through most pharmacies or online.  Gavison Advance is twice as strong as the Original (without side effects), but can only be purchased online. Most easily accessible through [employer]. I find the Advance works better than Original, so try to obtain this if you can.     How to take  Read the instructions to confirm dose, but generally it is 10 mL taken 3 times daily near meals.     "

## 2020-07-09 ENCOUNTER — OFFICE VISIT (OUTPATIENT)
Dept: FAMILY MEDICINE | Facility: CLINIC | Age: 75
End: 2020-07-09
Payer: MEDICARE

## 2020-07-09 VITALS
DIASTOLIC BLOOD PRESSURE: 80 MMHG | BODY MASS INDEX: 22.69 KG/M2 | HEIGHT: 74 IN | TEMPERATURE: 98 F | WEIGHT: 176.81 LBS | HEART RATE: 66 BPM | SYSTOLIC BLOOD PRESSURE: 136 MMHG

## 2020-07-09 DIAGNOSIS — E78.5 HYPERLIPIDEMIA, UNSPECIFIED HYPERLIPIDEMIA TYPE: ICD-10-CM

## 2020-07-09 DIAGNOSIS — Z00.00 HEALTH MAINTENANCE EXAMINATION: ICD-10-CM

## 2020-07-09 DIAGNOSIS — F17.200 TOBACCO USE DISORDER: ICD-10-CM

## 2020-07-09 DIAGNOSIS — I10 ESSENTIAL HYPERTENSION: Primary | ICD-10-CM

## 2020-07-09 DIAGNOSIS — Z86.002 HISTORY OF CARCINOMA IN SITU OF PROSTATE: ICD-10-CM

## 2020-07-09 PROCEDURE — 99999 PR PBB SHADOW E&M-EST. PATIENT-LVL IV: CPT | Mod: PBBFAC,,, | Performed by: FAMILY MEDICINE

## 2020-07-09 PROCEDURE — 3079F PR MOST RECENT DIASTOLIC BLOOD PRESSURE 80-89 MM HG: ICD-10-PCS | Mod: CPTII,S$GLB,, | Performed by: FAMILY MEDICINE

## 2020-07-09 PROCEDURE — 3079F DIAST BP 80-89 MM HG: CPT | Mod: CPTII,S$GLB,, | Performed by: FAMILY MEDICINE

## 2020-07-09 PROCEDURE — 99999 PR PBB SHADOW E&M-EST. PATIENT-LVL IV: ICD-10-PCS | Mod: PBBFAC,,, | Performed by: FAMILY MEDICINE

## 2020-07-09 PROCEDURE — 3075F SYST BP GE 130 - 139MM HG: CPT | Mod: CPTII,S$GLB,, | Performed by: FAMILY MEDICINE

## 2020-07-09 PROCEDURE — 99214 OFFICE O/P EST MOD 30 MIN: CPT | Mod: S$GLB,,, | Performed by: FAMILY MEDICINE

## 2020-07-09 PROCEDURE — 3075F PR MOST RECENT SYSTOLIC BLOOD PRESS GE 130-139MM HG: ICD-10-PCS | Mod: CPTII,S$GLB,, | Performed by: FAMILY MEDICINE

## 2020-07-09 PROCEDURE — 99214 PR OFFICE/OUTPT VISIT, EST, LEVL IV, 30-39 MIN: ICD-10-PCS | Mod: S$GLB,,, | Performed by: FAMILY MEDICINE

## 2020-07-09 RX ORDER — PRAVASTATIN SODIUM 40 MG/1
40 TABLET ORAL DAILY
Qty: 90 TABLET | Refills: 3 | Status: SHIPPED | OUTPATIENT
Start: 2020-07-09 | End: 2021-07-06 | Stop reason: SDUPTHER

## 2020-07-09 RX ORDER — ZOSTER VACCINE RECOMBINANT, ADJUVANTED 50 MCG/0.5
0.5 KIT INTRAMUSCULAR ONCE
Qty: 0.5 ML | Refills: 1 | Status: SHIPPED | OUTPATIENT
Start: 2020-07-09 | End: 2020-07-09

## 2020-07-09 NOTE — PROGRESS NOTES
"Subjective:       Patient ID: Dutch Olivier is a 74 y.o. male.    Chief Complaint: Hypertension (HTN f/u)    Annual exam and follow-up hypertension.  He takes benazepril 20 mg twice a day.  His blood pressure is usually controlled at home.  He had a follow-up with Dr. Arrieta for vocal cord leukoplakia.  He has emphysema and some dyspnea with heavy work.  No coughing.  He is status post prostatectomy in 2000.  He recently had an increase in his PSA.  He has no dysuria or frequency.  He continues to smoke.    Review of Systems   Constitutional: Negative for fatigue, fever and unexpected weight change.   HENT: Negative.    Eyes: Negative for visual disturbance.   Respiratory: Positive for shortness of breath. Negative for cough and wheezing.    Cardiovascular: Negative for chest pain, palpitations and leg swelling.   Gastrointestinal: Negative for abdominal pain, blood in stool and diarrhea.   Genitourinary: Negative for difficulty urinating and hematuria.   Integumentary:         No neoplasms    Neurological: Negative for weakness and numbness.         Objective:     Blood pressure 136/80, pulse 66, temperature 98.4 °F (36.9 °C), temperature source Temporal, height 6' 2" (1.88 m), weight 80.2 kg (176 lb 12.9 oz).      Physical Exam  Constitutional:       Appearance: He is well-developed.      Comments: No distress   Eyes:      Conjunctiva/sclera: Conjunctivae normal.      Pupils: Pupils are equal, round, and reactive to light.   Neck:      Musculoskeletal: Normal range of motion.      Thyroid: No thyromegaly.   Cardiovascular:      Rate and Rhythm: Normal rate.      Heart sounds: Normal heart sounds.      Comments: Frequent PVCs  Pulmonary:      Effort: Pulmonary effort is normal.      Breath sounds: Normal breath sounds. No wheezing or rales.   Abdominal:      General: Bowel sounds are normal.      Palpations: Abdomen is soft. There is no mass.      Tenderness: There is no abdominal tenderness. "   Musculoskeletal:         General: No swelling.   Lymphadenopathy:      Cervical: No cervical adenopathy.   Skin:     Comments: Lots of actinic changes.   Neurological:      Mental Status: He is alert.         Assessment:       1. Essential hypertension    2. History of carcinoma in situ of prostate    3. Health maintenance examination    4. Tobacco use disorder        Plan:       Urology consult because his PSA has increased from 0.07-1.1.  Continue benazepril.    his cardiovascular risk calculation is more than 30%.  He agrees to start pravastatin 40 mg.

## 2020-08-03 ENCOUNTER — OFFICE VISIT (OUTPATIENT)
Dept: UROLOGY | Facility: CLINIC | Age: 75
End: 2020-08-03
Payer: MEDICARE

## 2020-08-03 VITALS
HEART RATE: 61 BPM | DIASTOLIC BLOOD PRESSURE: 83 MMHG | WEIGHT: 176.81 LBS | HEIGHT: 74 IN | SYSTOLIC BLOOD PRESSURE: 125 MMHG | BODY MASS INDEX: 22.69 KG/M2

## 2020-08-03 DIAGNOSIS — Z85.46 HISTORY OF PROSTATE CANCER: ICD-10-CM

## 2020-08-03 PROCEDURE — 99203 OFFICE O/P NEW LOW 30 MIN: CPT | Mod: S$GLB,,, | Performed by: UROLOGY

## 2020-08-03 PROCEDURE — 1159F PR MEDICATION LIST DOCUMENTED IN MEDICAL RECORD: ICD-10-PCS | Mod: S$GLB,,, | Performed by: UROLOGY

## 2020-08-03 PROCEDURE — 3079F PR MOST RECENT DIASTOLIC BLOOD PRESSURE 80-89 MM HG: ICD-10-PCS | Mod: CPTII,S$GLB,, | Performed by: UROLOGY

## 2020-08-03 PROCEDURE — 1126F PR PAIN SEVERITY QUANTIFIED, NO PAIN PRESENT: ICD-10-PCS | Mod: S$GLB,,, | Performed by: UROLOGY

## 2020-08-03 PROCEDURE — 1101F PR PT FALLS ASSESS DOC 0-1 FALLS W/OUT INJ PAST YR: ICD-10-PCS | Mod: CPTII,S$GLB,, | Performed by: UROLOGY

## 2020-08-03 PROCEDURE — 1126F AMNT PAIN NOTED NONE PRSNT: CPT | Mod: S$GLB,,, | Performed by: UROLOGY

## 2020-08-03 PROCEDURE — 1101F PT FALLS ASSESS-DOCD LE1/YR: CPT | Mod: CPTII,S$GLB,, | Performed by: UROLOGY

## 2020-08-03 PROCEDURE — 3079F DIAST BP 80-89 MM HG: CPT | Mod: CPTII,S$GLB,, | Performed by: UROLOGY

## 2020-08-03 PROCEDURE — 1159F MED LIST DOCD IN RCRD: CPT | Mod: S$GLB,,, | Performed by: UROLOGY

## 2020-08-03 PROCEDURE — 99999 PR PBB SHADOW E&M-EST. PATIENT-LVL III: ICD-10-PCS | Mod: PBBFAC,,, | Performed by: UROLOGY

## 2020-08-03 PROCEDURE — 3074F PR MOST RECENT SYSTOLIC BLOOD PRESSURE < 130 MM HG: ICD-10-PCS | Mod: CPTII,S$GLB,, | Performed by: UROLOGY

## 2020-08-03 PROCEDURE — 99203 PR OFFICE/OUTPT VISIT, NEW, LEVL III, 30-44 MIN: ICD-10-PCS | Mod: S$GLB,,, | Performed by: UROLOGY

## 2020-08-03 PROCEDURE — 99999 PR PBB SHADOW E&M-EST. PATIENT-LVL III: CPT | Mod: PBBFAC,,, | Performed by: UROLOGY

## 2020-08-03 PROCEDURE — 3074F SYST BP LT 130 MM HG: CPT | Mod: CPTII,S$GLB,, | Performed by: UROLOGY

## 2020-08-03 NOTE — LETTER
August 3, 2020      Azam Nicole MD  [address]  [facility] [address]           [facility]  [address] [place] [address]  Phone: [phone]  Fax: [phone]          Patient: Dutch Olivier   MR Number: [id number]   YOB: 1945   Date of Visit: 8/3/2020       Dear Dr. Azam Nicole:    Thank you for referring Dutch Olivier to me for evaluation. Attached you will find relevant portions of my assessment and plan of care.    If you have questions, please do not hesitate to call me. I look forward to following Dutch Olivier along with you.    Sincerely,    EZ Kwan MD    Enclosure  CC:  No Recipients    If you would like to receive this communication electronically, please contact [email or web address] or [phone] to request more information on [employer] access.    For providers and/or their staff who would like to refer a patient to [facility], please contact us through our one-stop-shop provider referral line, [facility], at [phone].    If you feel you have received this communication in error or would no longer like to receive these types of communications, please e-mail [email or web address]

## 2020-08-03 NOTE — PROGRESS NOTES
Subjective:       Patient ID: Dutch Olivier is a 75 y.o. male.    Chief Complaint: Prostate Cancer    HPI       75-year-old with a distant history of prostate cancer.  He underwent radical prostatectomy in 2000 by Dr. Landers.  The original path is unknown although he says it was favorable with negative lymph nodes and negative margins.  He has no bothersome urinary symptoms.  He has no urinary incontinence.  His PSA has been undetectable however in 2015 with begin to see a slight increase in his PSA.  In the last year his PSA has increased from 0.07-1.1.  We discussed the significance of his rising PSA.  We discussed treatment options including salvage radiation.  We also discussed continued observation and perhaps beginning treatment with androgen ablation a later date.      Component PSA, SCREEN PSA DIAGNOSTIC   Latest Ref Rng & Units 0 - 4 ng/ml 0.00 - 4.00 ng/mL   7/7/2020  1.1   5/9/2019  0.07   1/16/2018  0.04   1/12/2016  0.02   7/22/2015  0.03   1/30/2015  0.03   11/19/2013  <0.01   9/12/2012 <0.010    8/5/2011 <0.01    6/25/2010 0.01    10/20/2006 <0.1          Review of Systems   Constitutional: Negative for fever.   Eyes: Negative for visual disturbance.   Respiratory: Negative for shortness of breath.    Cardiovascular: Negative for chest pain.   Gastrointestinal: Negative for nausea.   Genitourinary: Negative for dysuria and hematuria.   Musculoskeletal: Negative for gait problem.   Skin: Negative for rash.   Neurological: Negative for seizures.   Psychiatric/Behavioral: Negative for confusion.       Objective:      Physical Exam  Vitals signs reviewed.   Constitutional:       Appearance: He is well-developed.   HENT:      Head: Normocephalic and atraumatic.   Eyes:      Conjunctiva/sclera: Conjunctivae normal.   Cardiovascular:      Rate and Rhythm: Normal rate.   Pulmonary:      Effort: Pulmonary effort is normal.   Genitourinary:     Prostate: No nodules present (Calcification? anterior rectal  wall.  ).      Rectum: No mass. Normal anal tone.   Musculoskeletal: Normal range of motion.   Skin:     General: Skin is warm and dry.      Findings: No rash.   Neurological:      Mental Status: He is alert and oriented to person, place, and time.         Assessment:       1. History of prostate cancer        Plan:       History of prostate cancer  -     Ambulatory referral/consult to Urology  -     Prostate Specific Antigen, Diagnostic; Future; Expected date: 02/03/2021      late biochemical recurrence.  We will observe for now.  Follow up 6 months with PSA

## 2020-08-11 RX ORDER — BENAZEPRIL HYDROCHLORIDE 20 MG/1
TABLET ORAL
Qty: 180 TABLET | Refills: 3 | Status: SHIPPED | OUTPATIENT
Start: 2020-08-11 | End: 2021-08-11 | Stop reason: SDUPTHER

## 2020-11-05 ENCOUNTER — PES CALL (OUTPATIENT)
Dept: ADMINISTRATIVE | Facility: CLINIC | Age: 75
End: 2020-11-05

## 2021-02-02 ENCOUNTER — LAB VISIT (OUTPATIENT)
Dept: LAB | Facility: HOSPITAL | Age: 76
End: 2021-02-02
Attending: UROLOGY
Payer: MEDICARE

## 2021-02-02 DIAGNOSIS — Z85.46 HISTORY OF PROSTATE CANCER: ICD-10-CM

## 2021-02-02 PROCEDURE — 84153 ASSAY OF PSA TOTAL: CPT

## 2021-02-02 PROCEDURE — 36415 COLL VENOUS BLD VENIPUNCTURE: CPT | Mod: PN

## 2021-02-03 LAB — COMPLEXED PSA SERPL-MCNC: 21.5 NG/ML (ref 0–4)

## 2021-02-04 ENCOUNTER — PATIENT OUTREACH (OUTPATIENT)
Dept: ADMINISTRATIVE | Facility: OTHER | Age: 76
End: 2021-02-04

## 2021-02-05 ENCOUNTER — TELEPHONE (OUTPATIENT)
Dept: UROLOGY | Facility: CLINIC | Age: 76
End: 2021-02-05

## 2021-02-05 ENCOUNTER — LAB VISIT (OUTPATIENT)
Dept: LAB | Facility: HOSPITAL | Age: 76
End: 2021-02-05
Attending: UROLOGY
Payer: MEDICARE

## 2021-02-05 ENCOUNTER — OFFICE VISIT (OUTPATIENT)
Dept: UROLOGY | Facility: CLINIC | Age: 76
End: 2021-02-05
Payer: MEDICARE

## 2021-02-05 VITALS — BODY MASS INDEX: 22.69 KG/M2 | HEIGHT: 74 IN | WEIGHT: 176.81 LBS

## 2021-02-05 DIAGNOSIS — C61 MALIGNANT NEOPLASM OF PROSTATE: ICD-10-CM

## 2021-02-05 DIAGNOSIS — Z85.46 HISTORY OF PROSTATE CANCER: ICD-10-CM

## 2021-02-05 DIAGNOSIS — Z85.46 HISTORY OF PROSTATE CANCER: Primary | ICD-10-CM

## 2021-02-05 LAB
BILIRUB SERPL-MCNC: ABNORMAL MG/DL
BLOOD URINE, POC: ABNORMAL
CLARITY, POC UA: CLEAR
COLOR, POC UA: YELLOW
COMPLEXED PSA SERPL-MCNC: 23.5 NG/ML (ref 0–4)
GLUCOSE UR QL STRIP: ABNORMAL
KETONES UR QL STRIP: ABNORMAL
LEUKOCYTE ESTERASE URINE, POC: ABNORMAL
NITRITE, POC UA: ABNORMAL
PH, POC UA: 5.5
PROTEIN, POC: 30
SPECIFIC GRAVITY, POC UA: >=1.03
UROBILINOGEN, POC UA: 0.2

## 2021-02-05 PROCEDURE — 1159F PR MEDICATION LIST DOCUMENTED IN MEDICAL RECORD: ICD-10-PCS | Mod: S$GLB,,, | Performed by: UROLOGY

## 2021-02-05 PROCEDURE — 81002 POCT URINE DIPSTICK WITHOUT MICROSCOPE: ICD-10-PCS | Mod: S$GLB,,, | Performed by: UROLOGY

## 2021-02-05 PROCEDURE — 3288F PR FALLS RISK ASSESSMENT DOCUMENTED: ICD-10-PCS | Mod: CPTII,S$GLB,, | Performed by: UROLOGY

## 2021-02-05 PROCEDURE — 36415 COLL VENOUS BLD VENIPUNCTURE: CPT | Mod: PO

## 2021-02-05 PROCEDURE — 84153 ASSAY OF PSA TOTAL: CPT

## 2021-02-05 PROCEDURE — 99214 OFFICE O/P EST MOD 30 MIN: CPT | Mod: 25,S$GLB,, | Performed by: UROLOGY

## 2021-02-05 PROCEDURE — 99999 PR PBB SHADOW E&M-EST. PATIENT-LVL III: ICD-10-PCS | Mod: PBBFAC,,, | Performed by: UROLOGY

## 2021-02-05 PROCEDURE — 3288F FALL RISK ASSESSMENT DOCD: CPT | Mod: CPTII,S$GLB,, | Performed by: UROLOGY

## 2021-02-05 PROCEDURE — 1101F PR PT FALLS ASSESS DOC 0-1 FALLS W/OUT INJ PAST YR: ICD-10-PCS | Mod: CPTII,S$GLB,, | Performed by: UROLOGY

## 2021-02-05 PROCEDURE — 1159F MED LIST DOCD IN RCRD: CPT | Mod: S$GLB,,, | Performed by: UROLOGY

## 2021-02-05 PROCEDURE — 1126F PR PAIN SEVERITY QUANTIFIED, NO PAIN PRESENT: ICD-10-PCS | Mod: S$GLB,,, | Performed by: UROLOGY

## 2021-02-05 PROCEDURE — 99214 PR OFFICE/OUTPT VISIT, EST, LEVL IV, 30-39 MIN: ICD-10-PCS | Mod: 25,S$GLB,, | Performed by: UROLOGY

## 2021-02-05 PROCEDURE — 81002 URINALYSIS NONAUTO W/O SCOPE: CPT | Mod: S$GLB,,, | Performed by: UROLOGY

## 2021-02-05 PROCEDURE — 99999 PR PBB SHADOW E&M-EST. PATIENT-LVL III: CPT | Mod: PBBFAC,,, | Performed by: UROLOGY

## 2021-02-05 PROCEDURE — 1126F AMNT PAIN NOTED NONE PRSNT: CPT | Mod: S$GLB,,, | Performed by: UROLOGY

## 2021-02-05 PROCEDURE — 1101F PT FALLS ASSESS-DOCD LE1/YR: CPT | Mod: CPTII,S$GLB,, | Performed by: UROLOGY

## 2021-02-05 RX ORDER — A/SINGAPORE/GP1908/2015 IVR-180 (AN A/MICHIGAN/45/2015 (H1N1)PDM09-LIKE VIRUS, A/HONG KONG/4801/2014, NYMC X-263B (H3N2) (AN A/HONG KONG/4801/2014-LIKE VIRUS), AND B/BRISBANE/60/2008, WILD TYPE (A B/BRISBANE/60/2008-LIKE VIRUS) 15; 15; 15 UG/.5ML; UG/.5ML; UG/.5ML
INJECTION, SUSPENSION INTRAMUSCULAR
COMMUNITY
Start: 2020-11-19 | End: 2021-08-11 | Stop reason: ALTCHOICE

## 2021-02-10 ENCOUNTER — HOSPITAL ENCOUNTER (OUTPATIENT)
Dept: RADIOLOGY | Facility: HOSPITAL | Age: 76
Discharge: HOME | End: 2021-02-10
Attending: UROLOGY
Payer: MEDICARE

## 2021-02-10 DIAGNOSIS — Z85.46 HISTORY OF PROSTATE CANCER: ICD-10-CM

## 2021-02-10 PROCEDURE — 78306 BONE IMAGING WHOLE BODY: CPT | Mod: 26,,, | Performed by: RADIOLOGY

## 2021-02-10 PROCEDURE — 78306 NM BONE SCAN WHOLE BODY: ICD-10-PCS | Mod: 26,,, | Performed by: RADIOLOGY

## 2021-02-10 PROCEDURE — A9503 TC99M MEDRONATE: HCPCS

## 2021-02-24 ENCOUNTER — CLINICAL SUPPORT (OUTPATIENT)
Dept: UROLOGY | Facility: CLINIC | Age: 76
End: 2021-02-24
Payer: MEDICARE

## 2021-02-24 ENCOUNTER — SPECIALTY PHARMACY (OUTPATIENT)
Dept: PHARMACY | Facility: CLINIC | Age: 76
End: 2021-02-24

## 2021-02-24 VITALS — WEIGHT: 176.81 LBS | HEIGHT: 74 IN | BODY MASS INDEX: 22.69 KG/M2

## 2021-02-24 DIAGNOSIS — C61 PROSTATE CANCER: Primary | ICD-10-CM

## 2021-02-24 PROCEDURE — 96402 PR CHEMOTHER HORMON ANTINEOPL SUB-Q/IM: ICD-10-PCS | Mod: S$GLB,,, | Performed by: UROLOGY

## 2021-02-24 PROCEDURE — 99499 UNLISTED E&M SERVICE: CPT | Mod: S$GLB,,, | Performed by: UROLOGY

## 2021-02-24 PROCEDURE — 96402 CHEMO HORMON ANTINEOPL SQ/IM: CPT | Mod: S$GLB,,, | Performed by: UROLOGY

## 2021-02-24 PROCEDURE — 99499 RISK ADDL DX/OHS AUDIT: ICD-10-PCS | Mod: S$GLB,,, | Performed by: UROLOGY

## 2021-02-24 PROCEDURE — 99999 PR PBB SHADOW E&M-EST. PATIENT-LVL III: ICD-10-PCS | Mod: PBBFAC,,, | Performed by: UROLOGY

## 2021-02-24 PROCEDURE — 99999 PR PBB SHADOW E&M-EST. PATIENT-LVL III: CPT | Mod: PBBFAC,,, | Performed by: UROLOGY

## 2021-03-06 ENCOUNTER — IMMUNIZATION (OUTPATIENT)
Dept: PRIMARY CARE CLINIC | Facility: CLINIC | Age: 76
End: 2021-03-06
Payer: MEDICARE

## 2021-03-06 DIAGNOSIS — Z23 NEED FOR VACCINATION: Primary | ICD-10-CM

## 2021-03-06 PROCEDURE — 91300 COVID-19, MRNA, LNP-S, PF, 30 MCG/0.3 ML DOSE VACCINE: CPT | Mod: S$GLB,,, | Performed by: FAMILY MEDICINE

## 2021-03-06 PROCEDURE — 91300 COVID-19, MRNA, LNP-S, PF, 30 MCG/0.3 ML DOSE VACCINE: ICD-10-PCS | Mod: S$GLB,,, | Performed by: FAMILY MEDICINE

## 2021-03-06 PROCEDURE — 0001A COVID-19, MRNA, LNP-S, PF, 30 MCG/0.3 ML DOSE VACCINE: ICD-10-PCS | Mod: CV19,S$GLB,, | Performed by: FAMILY MEDICINE

## 2021-03-06 PROCEDURE — 0001A COVID-19, MRNA, LNP-S, PF, 30 MCG/0.3 ML DOSE VACCINE: CPT | Mod: CV19,S$GLB,, | Performed by: FAMILY MEDICINE

## 2021-03-10 ENCOUNTER — DOCUMENTATION ONLY (OUTPATIENT)
Dept: ADMINISTRATIVE | Facility: OTHER | Age: 76
End: 2021-03-10

## 2021-03-19 ENCOUNTER — HOSPITAL ENCOUNTER (OUTPATIENT)
Dept: RADIOLOGY | Facility: HOSPITAL | Age: 76
Discharge: HOME | End: 2021-03-19
Attending: RADIOLOGY
Payer: MEDICARE

## 2021-03-19 DIAGNOSIS — C79.52 SECONDARY MALIGNANT NEOPLASM OF BONE AND BONE MARROW: ICD-10-CM

## 2021-03-19 DIAGNOSIS — C79.51 SECONDARY MALIGNANT NEOPLASM OF BONE AND BONE MARROW: ICD-10-CM

## 2021-03-19 DIAGNOSIS — C61 MALIGNANT NEOPLASM OF PROSTATE: ICD-10-CM

## 2021-03-19 PROCEDURE — 72192 CT PELVIS W/O DYE: CPT | Mod: 26,,, | Performed by: RADIOLOGY

## 2021-03-19 PROCEDURE — 25500020 PHARM REV CODE 255

## 2021-03-19 PROCEDURE — A9698 NON-RAD CONTRAST MATERIALNOC: HCPCS

## 2021-03-19 PROCEDURE — 72192 CT PELVIS WITHOUT CONTRAST: ICD-10-PCS | Mod: 26,,, | Performed by: RADIOLOGY

## 2021-03-19 PROCEDURE — 72192 CT PELVIS W/O DYE: CPT | Mod: TC

## 2021-03-19 RX ADMIN — IOHEXOL 1000 ML: 9 SOLUTION ORAL at 08:03

## 2021-03-26 ENCOUNTER — TELEPHONE (OUTPATIENT)
Dept: UROLOGY | Facility: CLINIC | Age: 76
End: 2021-03-26

## 2021-03-26 DIAGNOSIS — C61 PROSTATE CANCER: Primary | ICD-10-CM

## 2021-03-27 ENCOUNTER — IMMUNIZATION (OUTPATIENT)
Dept: PRIMARY CARE CLINIC | Facility: CLINIC | Age: 76
End: 2021-03-27

## 2021-03-27 DIAGNOSIS — Z23 NEED FOR VACCINATION: Primary | ICD-10-CM

## 2021-03-27 PROCEDURE — 0002A COVID-19, MRNA, LNP-S, PF, 30 MCG/0.3 ML DOSE VACCINE: ICD-10-PCS | Mod: CV19,S$GLB,, | Performed by: FAMILY MEDICINE

## 2021-03-27 PROCEDURE — 91300 COVID-19, MRNA, LNP-S, PF, 30 MCG/0.3 ML DOSE VACCINE: CPT | Mod: S$GLB,,, | Performed by: FAMILY MEDICINE

## 2021-03-27 PROCEDURE — 0002A COVID-19, MRNA, LNP-S, PF, 30 MCG/0.3 ML DOSE VACCINE: CPT | Mod: CV19,S$GLB,, | Performed by: FAMILY MEDICINE

## 2021-03-27 PROCEDURE — 91300 COVID-19, MRNA, LNP-S, PF, 30 MCG/0.3 ML DOSE VACCINE: ICD-10-PCS | Mod: S$GLB,,, | Performed by: FAMILY MEDICINE

## 2021-03-29 ENCOUNTER — SPECIALTY PHARMACY (OUTPATIENT)
Dept: PHARMACY | Facility: CLINIC | Age: 76
End: 2021-03-29

## 2021-03-29 ENCOUNTER — TELEPHONE (OUTPATIENT)
Dept: UROLOGY | Facility: CLINIC | Age: 76
End: 2021-03-29

## 2021-04-14 ENCOUNTER — TELEPHONE (OUTPATIENT)
Dept: UROLOGY | Facility: CLINIC | Age: 76
End: 2021-04-14

## 2021-04-22 ENCOUNTER — TELEPHONE (OUTPATIENT)
Dept: UROLOGY | Facility: CLINIC | Age: 76
End: 2021-04-22

## 2021-06-02 ENCOUNTER — LAB VISIT (OUTPATIENT)
Dept: LAB | Facility: HOSPITAL | Age: 76
End: 2021-06-02
Attending: UROLOGY
Payer: MEDICARE

## 2021-06-02 ENCOUNTER — TELEPHONE (OUTPATIENT)
Dept: UROLOGY | Facility: CLINIC | Age: 76
End: 2021-06-02

## 2021-06-02 ENCOUNTER — PATIENT MESSAGE (OUTPATIENT)
Dept: PHARMACY | Facility: CLINIC | Age: 76
End: 2021-06-02

## 2021-06-02 DIAGNOSIS — C61 PROSTATE CANCER: ICD-10-CM

## 2021-06-02 PROCEDURE — 84153 ASSAY OF PSA TOTAL: CPT | Performed by: UROLOGY

## 2021-06-02 PROCEDURE — 36415 COLL VENOUS BLD VENIPUNCTURE: CPT | Mod: PO | Performed by: UROLOGY

## 2021-06-03 LAB — COMPLEXED PSA SERPL-MCNC: 0.06 NG/ML (ref 0–4)

## 2021-07-07 ENCOUNTER — OFFICE VISIT (OUTPATIENT)
Dept: OTOLARYNGOLOGY | Facility: CLINIC | Age: 76
End: 2021-07-07
Payer: MEDICARE

## 2021-07-07 VITALS — WEIGHT: 176.56 LBS | HEIGHT: 74 IN | BODY MASS INDEX: 22.66 KG/M2

## 2021-07-07 DIAGNOSIS — J38.3 VOCAL CORD LEUKOPLAKIA: Primary | ICD-10-CM

## 2021-07-07 DIAGNOSIS — Z72.0 TOBACCO ABUSE: ICD-10-CM

## 2021-07-07 PROCEDURE — 1159F PR MEDICATION LIST DOCUMENTED IN MEDICAL RECORD: ICD-10-PCS | Mod: S$GLB,,, | Performed by: OTOLARYNGOLOGY

## 2021-07-07 PROCEDURE — 1126F PR PAIN SEVERITY QUANTIFIED, NO PAIN PRESENT: ICD-10-PCS | Mod: S$GLB,,, | Performed by: OTOLARYNGOLOGY

## 2021-07-07 PROCEDURE — 99213 PR OFFICE/OUTPT VISIT, EST, LEVL III, 20-29 MIN: ICD-10-PCS | Mod: 25,S$GLB,, | Performed by: OTOLARYNGOLOGY

## 2021-07-07 PROCEDURE — 1101F PR PT FALLS ASSESS DOC 0-1 FALLS W/OUT INJ PAST YR: ICD-10-PCS | Mod: CPTII,S$GLB,, | Performed by: OTOLARYNGOLOGY

## 2021-07-07 PROCEDURE — 31575 PR LARYNGOSCOPY, FLEXIBLE; DIAGNOSTIC: ICD-10-PCS | Mod: S$GLB,,, | Performed by: OTOLARYNGOLOGY

## 2021-07-07 PROCEDURE — 99999 PR PBB SHADOW E&M-EST. PATIENT-LVL III: ICD-10-PCS | Mod: PBBFAC,,, | Performed by: OTOLARYNGOLOGY

## 2021-07-07 PROCEDURE — 99213 OFFICE O/P EST LOW 20 MIN: CPT | Mod: 25,S$GLB,, | Performed by: OTOLARYNGOLOGY

## 2021-07-07 PROCEDURE — 1159F MED LIST DOCD IN RCRD: CPT | Mod: S$GLB,,, | Performed by: OTOLARYNGOLOGY

## 2021-07-07 PROCEDURE — 99999 PR PBB SHADOW E&M-EST. PATIENT-LVL III: CPT | Mod: PBBFAC,,, | Performed by: OTOLARYNGOLOGY

## 2021-07-07 PROCEDURE — 3288F FALL RISK ASSESSMENT DOCD: CPT | Mod: CPTII,S$GLB,, | Performed by: OTOLARYNGOLOGY

## 2021-07-07 PROCEDURE — 3288F PR FALLS RISK ASSESSMENT DOCUMENTED: ICD-10-PCS | Mod: CPTII,S$GLB,, | Performed by: OTOLARYNGOLOGY

## 2021-07-07 PROCEDURE — 31575 DIAGNOSTIC LARYNGOSCOPY: CPT | Mod: S$GLB,,, | Performed by: OTOLARYNGOLOGY

## 2021-07-07 PROCEDURE — 1126F AMNT PAIN NOTED NONE PRSNT: CPT | Mod: S$GLB,,, | Performed by: OTOLARYNGOLOGY

## 2021-07-07 PROCEDURE — 1101F PT FALLS ASSESS-DOCD LE1/YR: CPT | Mod: CPTII,S$GLB,, | Performed by: OTOLARYNGOLOGY

## 2021-07-27 ENCOUNTER — TELEPHONE (OUTPATIENT)
Dept: FAMILY MEDICINE | Facility: CLINIC | Age: 76
End: 2021-07-27

## 2021-07-27 DIAGNOSIS — I10 ESSENTIAL HYPERTENSION: Primary | ICD-10-CM

## 2021-08-03 ENCOUNTER — TELEPHONE (OUTPATIENT)
Dept: UROLOGY | Facility: CLINIC | Age: 76
End: 2021-08-03

## 2021-08-06 ENCOUNTER — LAB VISIT (OUTPATIENT)
Dept: LAB | Facility: HOSPITAL | Age: 76
End: 2021-08-06
Attending: FAMILY MEDICINE
Payer: MEDICARE

## 2021-08-06 DIAGNOSIS — I10 ESSENTIAL HYPERTENSION: ICD-10-CM

## 2021-08-06 LAB
ALBUMIN SERPL BCP-MCNC: 3.8 G/DL (ref 3.5–5.2)
ALP SERPL-CCNC: 73 U/L (ref 55–135)
ALT SERPL W/O P-5'-P-CCNC: 12 U/L (ref 10–44)
ANION GAP SERPL CALC-SCNC: 8 MMOL/L (ref 8–16)
AST SERPL-CCNC: 16 U/L (ref 10–40)
BASOPHILS # BLD AUTO: 0.08 K/UL (ref 0–0.2)
BASOPHILS NFR BLD: 0.8 % (ref 0–1.9)
BILIRUB SERPL-MCNC: 0.5 MG/DL (ref 0.1–1)
BUN SERPL-MCNC: 15 MG/DL (ref 8–23)
CALCIUM SERPL-MCNC: 9.6 MG/DL (ref 8.7–10.5)
CHLORIDE SERPL-SCNC: 106 MMOL/L (ref 95–110)
CHOLEST SERPL-MCNC: 202 MG/DL (ref 120–199)
CHOLEST/HDLC SERPL: 3.5 {RATIO} (ref 2–5)
CO2 SERPL-SCNC: 27 MMOL/L (ref 23–29)
CREAT SERPL-MCNC: 0.9 MG/DL (ref 0.5–1.4)
DIFFERENTIAL METHOD: ABNORMAL
EOSINOPHIL # BLD AUTO: 0.4 K/UL (ref 0–0.5)
EOSINOPHIL NFR BLD: 4.2 % (ref 0–8)
ERYTHROCYTE [DISTWIDTH] IN BLOOD BY AUTOMATED COUNT: 13.5 % (ref 11.5–14.5)
EST. GFR  (AFRICAN AMERICAN): >60 ML/MIN/1.73 M^2
EST. GFR  (NON AFRICAN AMERICAN): >60 ML/MIN/1.73 M^2
GLUCOSE SERPL-MCNC: 124 MG/DL (ref 70–110)
HCT VFR BLD AUTO: 45 % (ref 40–54)
HDLC SERPL-MCNC: 58 MG/DL (ref 40–75)
HDLC SERPL: 28.7 % (ref 20–50)
HGB BLD-MCNC: 14.8 G/DL (ref 14–18)
IMM GRANULOCYTES # BLD AUTO: 0.02 K/UL (ref 0–0.04)
IMM GRANULOCYTES NFR BLD AUTO: 0.2 % (ref 0–0.5)
LDLC SERPL CALC-MCNC: 107.6 MG/DL (ref 63–159)
LYMPHOCYTES # BLD AUTO: 3.9 K/UL (ref 1–4.8)
LYMPHOCYTES NFR BLD: 39.2 % (ref 18–48)
MCH RBC QN AUTO: 32.3 PG (ref 27–31)
MCHC RBC AUTO-ENTMCNC: 32.9 G/DL (ref 32–36)
MCV RBC AUTO: 98 FL (ref 82–98)
MONOCYTES # BLD AUTO: 1 K/UL (ref 0.3–1)
MONOCYTES NFR BLD: 9.5 % (ref 4–15)
NEUTROPHILS # BLD AUTO: 4.6 K/UL (ref 1.8–7.7)
NEUTROPHILS NFR BLD: 46.1 % (ref 38–73)
NONHDLC SERPL-MCNC: 144 MG/DL
NRBC BLD-RTO: 0 /100 WBC
PLATELET # BLD AUTO: 332 K/UL (ref 150–450)
PMV BLD AUTO: 10.5 FL (ref 9.2–12.9)
POTASSIUM SERPL-SCNC: 4.4 MMOL/L (ref 3.5–5.1)
PROT SERPL-MCNC: 6.8 G/DL (ref 6–8.4)
RBC # BLD AUTO: 4.58 M/UL (ref 4.6–6.2)
SODIUM SERPL-SCNC: 141 MMOL/L (ref 136–145)
TRIGL SERPL-MCNC: 182 MG/DL (ref 30–150)
WBC # BLD AUTO: 9.95 K/UL (ref 3.9–12.7)

## 2021-08-06 PROCEDURE — 80053 COMPREHEN METABOLIC PANEL: CPT | Performed by: FAMILY MEDICINE

## 2021-08-06 PROCEDURE — 36415 COLL VENOUS BLD VENIPUNCTURE: CPT | Mod: PN | Performed by: FAMILY MEDICINE

## 2021-08-06 PROCEDURE — 80061 LIPID PANEL: CPT | Performed by: FAMILY MEDICINE

## 2021-08-06 PROCEDURE — 85025 COMPLETE CBC W/AUTO DIFF WBC: CPT | Performed by: FAMILY MEDICINE

## 2021-08-11 ENCOUNTER — OFFICE VISIT (OUTPATIENT)
Dept: FAMILY MEDICINE | Facility: CLINIC | Age: 76
End: 2021-08-11
Payer: MEDICARE

## 2021-08-11 VITALS
WEIGHT: 174.81 LBS | DIASTOLIC BLOOD PRESSURE: 74 MMHG | HEART RATE: 58 BPM | SYSTOLIC BLOOD PRESSURE: 134 MMHG | BODY MASS INDEX: 22.43 KG/M2 | HEIGHT: 74 IN

## 2021-08-11 DIAGNOSIS — I10 ESSENTIAL HYPERTENSION: Primary | ICD-10-CM

## 2021-08-11 DIAGNOSIS — C61 PROSTATE CANCER METASTATIC TO BONE: ICD-10-CM

## 2021-08-11 DIAGNOSIS — J44.9 CHRONIC OBSTRUCTIVE PULMONARY DISEASE, UNSPECIFIED COPD TYPE: ICD-10-CM

## 2021-08-11 DIAGNOSIS — C79.51 PROSTATE CANCER METASTATIC TO BONE: ICD-10-CM

## 2021-08-11 PROCEDURE — 1159F MED LIST DOCD IN RCRD: CPT | Mod: CPTII,S$GLB,, | Performed by: FAMILY MEDICINE

## 2021-08-11 PROCEDURE — 3075F SYST BP GE 130 - 139MM HG: CPT | Mod: CPTII,S$GLB,, | Performed by: FAMILY MEDICINE

## 2021-08-11 PROCEDURE — 3288F FALL RISK ASSESSMENT DOCD: CPT | Mod: CPTII,S$GLB,, | Performed by: FAMILY MEDICINE

## 2021-08-11 PROCEDURE — 1126F AMNT PAIN NOTED NONE PRSNT: CPT | Mod: CPTII,S$GLB,, | Performed by: FAMILY MEDICINE

## 2021-08-11 PROCEDURE — 99499 UNLISTED E&M SERVICE: CPT | Mod: S$GLB,,, | Performed by: FAMILY MEDICINE

## 2021-08-11 PROCEDURE — 99999 PR PBB SHADOW E&M-EST. PATIENT-LVL III: ICD-10-PCS | Mod: PBBFAC,,, | Performed by: FAMILY MEDICINE

## 2021-08-11 PROCEDURE — 1101F PR PT FALLS ASSESS DOC 0-1 FALLS W/OUT INJ PAST YR: ICD-10-PCS | Mod: CPTII,S$GLB,, | Performed by: FAMILY MEDICINE

## 2021-08-11 PROCEDURE — 99499 RISK ADDL DX/OHS AUDIT: ICD-10-PCS | Mod: S$GLB,,, | Performed by: FAMILY MEDICINE

## 2021-08-11 PROCEDURE — 1160F RVW MEDS BY RX/DR IN RCRD: CPT | Mod: CPTII,S$GLB,, | Performed by: FAMILY MEDICINE

## 2021-08-11 PROCEDURE — 3288F PR FALLS RISK ASSESSMENT DOCUMENTED: ICD-10-PCS | Mod: CPTII,S$GLB,, | Performed by: FAMILY MEDICINE

## 2021-08-11 PROCEDURE — 3078F PR MOST RECENT DIASTOLIC BLOOD PRESSURE < 80 MM HG: ICD-10-PCS | Mod: CPTII,S$GLB,, | Performed by: FAMILY MEDICINE

## 2021-08-11 PROCEDURE — 99999 PR PBB SHADOW E&M-EST. PATIENT-LVL III: CPT | Mod: PBBFAC,,, | Performed by: FAMILY MEDICINE

## 2021-08-11 PROCEDURE — 1101F PT FALLS ASSESS-DOCD LE1/YR: CPT | Mod: CPTII,S$GLB,, | Performed by: FAMILY MEDICINE

## 2021-08-11 PROCEDURE — 99215 OFFICE O/P EST HI 40 MIN: CPT | Mod: S$GLB,,, | Performed by: FAMILY MEDICINE

## 2021-08-11 PROCEDURE — 1160F PR REVIEW ALL MEDS BY PRESCRIBER/CLIN PHARMACIST DOCUMENTED: ICD-10-PCS | Mod: CPTII,S$GLB,, | Performed by: FAMILY MEDICINE

## 2021-08-11 PROCEDURE — 1159F PR MEDICATION LIST DOCUMENTED IN MEDICAL RECORD: ICD-10-PCS | Mod: CPTII,S$GLB,, | Performed by: FAMILY MEDICINE

## 2021-08-11 PROCEDURE — 99215 PR OFFICE/OUTPT VISIT, EST, LEVL V, 40-54 MIN: ICD-10-PCS | Mod: S$GLB,,, | Performed by: FAMILY MEDICINE

## 2021-08-11 PROCEDURE — 1126F PR PAIN SEVERITY QUANTIFIED, NO PAIN PRESENT: ICD-10-PCS | Mod: CPTII,S$GLB,, | Performed by: FAMILY MEDICINE

## 2021-08-11 PROCEDURE — 3078F DIAST BP <80 MM HG: CPT | Mod: CPTII,S$GLB,, | Performed by: FAMILY MEDICINE

## 2021-08-11 PROCEDURE — 3075F PR MOST RECENT SYSTOLIC BLOOD PRESS GE 130-139MM HG: ICD-10-PCS | Mod: CPTII,S$GLB,, | Performed by: FAMILY MEDICINE

## 2021-08-11 RX ORDER — BENAZEPRIL HYDROCHLORIDE 20 MG/1
20 TABLET ORAL 2 TIMES DAILY
Qty: 180 TABLET | Refills: 3 | Status: SHIPPED | OUTPATIENT
Start: 2021-08-11 | End: 2022-05-19 | Stop reason: SDUPTHER

## 2021-08-11 RX ORDER — PRAVASTATIN SODIUM 40 MG/1
40 TABLET ORAL DAILY
Qty: 90 TABLET | Refills: 3 | Status: SHIPPED | OUTPATIENT
Start: 2021-08-11 | End: 2022-05-19 | Stop reason: SDUPTHER

## 2021-08-18 ENCOUNTER — IMMUNIZATION (OUTPATIENT)
Dept: PRIMARY CARE CLINIC | Facility: CLINIC | Age: 76
End: 2021-08-18
Payer: MEDICARE

## 2021-08-18 DIAGNOSIS — Z23 NEED FOR VACCINATION: Primary | ICD-10-CM

## 2021-08-18 PROCEDURE — 91300 COVID-19, MRNA, LNP-S, PF, 30 MCG/0.3 ML DOSE VACCINE: ICD-10-PCS | Mod: S$GLB,,, | Performed by: FAMILY MEDICINE

## 2021-08-18 PROCEDURE — 0003A COVID-19, MRNA, LNP-S, PF, 30 MCG/0.3 ML DOSE VACCINE: ICD-10-PCS | Mod: CV19,S$GLB,, | Performed by: FAMILY MEDICINE

## 2021-08-18 PROCEDURE — 0003A COVID-19, MRNA, LNP-S, PF, 30 MCG/0.3 ML DOSE VACCINE: CPT | Mod: CV19,S$GLB,, | Performed by: FAMILY MEDICINE

## 2021-08-18 PROCEDURE — 91300 COVID-19, MRNA, LNP-S, PF, 30 MCG/0.3 ML DOSE VACCINE: CPT | Mod: S$GLB,,, | Performed by: FAMILY MEDICINE

## 2021-08-25 ENCOUNTER — CLINICAL SUPPORT (OUTPATIENT)
Dept: UROLOGY | Facility: CLINIC | Age: 76
End: 2021-08-25
Payer: MEDICARE

## 2021-08-25 ENCOUNTER — LAB VISIT (OUTPATIENT)
Dept: LAB | Facility: HOSPITAL | Age: 76
End: 2021-08-25
Attending: UROLOGY
Payer: MEDICARE

## 2021-08-25 VITALS — WEIGHT: 174.81 LBS | BODY MASS INDEX: 22.43 KG/M2 | HEIGHT: 74 IN

## 2021-08-25 DIAGNOSIS — C61 PROSTATE CANCER: ICD-10-CM

## 2021-08-25 DIAGNOSIS — C61 PROSTATE CANCER: Primary | ICD-10-CM

## 2021-08-25 LAB — COMPLEXED PSA SERPL-MCNC: <0.01 NG/ML (ref 0–4)

## 2021-08-25 PROCEDURE — 99999 PR PBB SHADOW E&M-EST. PATIENT-LVL III: CPT | Mod: PBBFAC,,, | Performed by: UROLOGY

## 2021-08-25 PROCEDURE — 99213 OFFICE O/P EST LOW 20 MIN: CPT | Mod: 25,S$GLB,, | Performed by: UROLOGY

## 2021-08-25 PROCEDURE — 96402 PR CHEMOTHER HORMON ANTINEOPL SUB-Q/IM: ICD-10-PCS | Mod: S$GLB,,, | Performed by: UROLOGY

## 2021-08-25 PROCEDURE — 96402 CHEMO HORMON ANTINEOPL SQ/IM: CPT | Mod: S$GLB,,, | Performed by: UROLOGY

## 2021-08-25 PROCEDURE — 84153 ASSAY OF PSA TOTAL: CPT | Performed by: UROLOGY

## 2021-08-25 PROCEDURE — 99999 PR PBB SHADOW E&M-EST. PATIENT-LVL III: ICD-10-PCS | Mod: PBBFAC,,, | Performed by: UROLOGY

## 2021-08-25 PROCEDURE — 99213 PR OFFICE/OUTPT VISIT, EST, LEVL III, 20-29 MIN: ICD-10-PCS | Mod: 25,S$GLB,, | Performed by: UROLOGY

## 2021-08-25 PROCEDURE — 36415 COLL VENOUS BLD VENIPUNCTURE: CPT | Mod: PO | Performed by: UROLOGY

## 2021-09-07 ENCOUNTER — TELEPHONE (OUTPATIENT)
Dept: PHARMACY | Facility: CLINIC | Age: 76
End: 2021-09-07

## 2022-02-17 ENCOUNTER — TELEPHONE (OUTPATIENT)
Dept: UROLOGY | Facility: CLINIC | Age: 77
End: 2022-02-17
Payer: MEDICARE

## 2022-02-17 NOTE — TELEPHONE ENCOUNTER
----- Message from Arlene Montez sent at 2/17/2022  2:07 PM CST -----  Contact: Patient  Type:  Needs Medical Advice    Who Called:  Patient       Would the patient rather a call back or a response via [facility]?  Call    Best Call Back Number:  [phone] (home) [phone] (work)    Additional Information:  Patient states he cant make it on 02/23 for his injection and needs to reschedule     Please call to reschedule thanks

## 2022-02-17 NOTE — TELEPHONE ENCOUNTER
Spoke with patient psa cancer injection rescheduled for 3/7/2022 @930 am. Patient expressed understanding.

## 2022-02-18 ENCOUNTER — OFFICE VISIT (OUTPATIENT)
Dept: OPTOMETRY | Facility: CLINIC | Age: 77
End: 2022-02-18
Payer: MEDICARE

## 2022-02-18 DIAGNOSIS — H25.13 NUCLEAR SCLEROSIS OF BOTH EYES: Primary | ICD-10-CM

## 2022-02-18 DIAGNOSIS — H53.022 REFRACTIVE AMBLYOPIA, LEFT: ICD-10-CM

## 2022-02-18 DIAGNOSIS — H52.7 REFRACTIVE ERROR: ICD-10-CM

## 2022-02-18 PROCEDURE — 99999 PR PBB SHADOW E&M-EST. PATIENT-LVL II: ICD-10-PCS | Mod: PBBFAC,,, | Performed by: OPTOMETRIST

## 2022-02-18 PROCEDURE — 1160F PR REVIEW ALL MEDS BY PRESCRIBER/CLIN PHARMACIST DOCUMENTED: ICD-10-PCS | Mod: CPTII,S$GLB,, | Performed by: OPTOMETRIST

## 2022-02-18 PROCEDURE — 1126F AMNT PAIN NOTED NONE PRSNT: CPT | Mod: CPTII,S$GLB,, | Performed by: OPTOMETRIST

## 2022-02-18 PROCEDURE — 1159F PR MEDICATION LIST DOCUMENTED IN MEDICAL RECORD: ICD-10-PCS | Mod: CPTII,S$GLB,, | Performed by: OPTOMETRIST

## 2022-02-18 PROCEDURE — 99999 PR PBB SHADOW E&M-EST. PATIENT-LVL II: CPT | Mod: PBBFAC,,, | Performed by: OPTOMETRIST

## 2022-02-18 PROCEDURE — 92004 COMPRE OPH EXAM NEW PT 1/>: CPT | Mod: S$GLB,,, | Performed by: OPTOMETRIST

## 2022-02-18 PROCEDURE — 3288F FALL RISK ASSESSMENT DOCD: CPT | Mod: CPTII,S$GLB,, | Performed by: OPTOMETRIST

## 2022-02-18 PROCEDURE — 1159F MED LIST DOCD IN RCRD: CPT | Mod: CPTII,S$GLB,, | Performed by: OPTOMETRIST

## 2022-02-18 PROCEDURE — 92004 PR EYE EXAM, NEW PATIENT,COMPREHESV: ICD-10-PCS | Mod: S$GLB,,, | Performed by: OPTOMETRIST

## 2022-02-18 PROCEDURE — 3288F PR FALLS RISK ASSESSMENT DOCUMENTED: ICD-10-PCS | Mod: CPTII,S$GLB,, | Performed by: OPTOMETRIST

## 2022-02-18 PROCEDURE — 1101F PR PT FALLS ASSESS DOC 0-1 FALLS W/OUT INJ PAST YR: ICD-10-PCS | Mod: CPTII,S$GLB,, | Performed by: OPTOMETRIST

## 2022-02-18 PROCEDURE — 1160F RVW MEDS BY RX/DR IN RCRD: CPT | Mod: CPTII,S$GLB,, | Performed by: OPTOMETRIST

## 2022-02-18 PROCEDURE — 92015 PR REFRACTION: ICD-10-PCS | Mod: S$GLB,,, | Performed by: OPTOMETRIST

## 2022-02-18 PROCEDURE — 1101F PT FALLS ASSESS-DOCD LE1/YR: CPT | Mod: CPTII,S$GLB,, | Performed by: OPTOMETRIST

## 2022-02-18 PROCEDURE — 92015 DETERMINE REFRACTIVE STATE: CPT | Mod: S$GLB,,, | Performed by: OPTOMETRIST

## 2022-02-18 PROCEDURE — 1126F PR PAIN SEVERITY QUANTIFIED, NO PAIN PRESENT: ICD-10-PCS | Mod: CPTII,S$GLB,, | Performed by: OPTOMETRIST

## 2022-02-18 NOTE — PROGRESS NOTES
HPI     Concerns About Ocular Health      Additional comments: Ocular health exam              Comments     DLS: 11/9/17 by dr Chowdary    Pt states no va complaints with present gls. No floaters or flashes.           Last edited by Cheryle Quintana on 2/18/2022  1:40 PM. (History)            Assessment /Plan     For exam results, see Encounter Report.    Nuclear sclerosis of both eyes    Refractive amblyopia, left    Refractive error      1. Educated pt on presence of cataracts and effects on vision. No surgery at this time. Recheck in one year.  2. Monitor condition. Patient to report any changes. RTC 1 year recheck.  3. Spectacle Rx given, discussed different options for glasses. RTC 1 year routine eye exam.

## 2022-03-07 ENCOUNTER — OFFICE VISIT (OUTPATIENT)
Dept: UROLOGY | Facility: CLINIC | Age: 77
End: 2022-03-07
Payer: MEDICARE

## 2022-03-07 VITALS — HEIGHT: 74 IN | WEIGHT: 179.88 LBS | BODY MASS INDEX: 23.08 KG/M2

## 2022-03-07 DIAGNOSIS — Z85.46 HISTORY OF PROSTATE CANCER: Primary | ICD-10-CM

## 2022-03-07 PROCEDURE — 3288F FALL RISK ASSESSMENT DOCD: CPT | Mod: CPTII,S$GLB,, | Performed by: UROLOGY

## 2022-03-07 PROCEDURE — 1159F PR MEDICATION LIST DOCUMENTED IN MEDICAL RECORD: ICD-10-PCS | Mod: CPTII,S$GLB,, | Performed by: UROLOGY

## 2022-03-07 PROCEDURE — 1160F RVW MEDS BY RX/DR IN RCRD: CPT | Mod: CPTII,S$GLB,, | Performed by: UROLOGY

## 2022-03-07 PROCEDURE — 1101F PR PT FALLS ASSESS DOC 0-1 FALLS W/OUT INJ PAST YR: ICD-10-PCS | Mod: CPTII,S$GLB,, | Performed by: UROLOGY

## 2022-03-07 PROCEDURE — 1126F PR PAIN SEVERITY QUANTIFIED, NO PAIN PRESENT: ICD-10-PCS | Mod: CPTII,S$GLB,, | Performed by: UROLOGY

## 2022-03-07 PROCEDURE — 99213 OFFICE O/P EST LOW 20 MIN: CPT | Mod: S$GLB,,, | Performed by: UROLOGY

## 2022-03-07 PROCEDURE — 99999 PR PBB SHADOW E&M-EST. PATIENT-LVL III: ICD-10-PCS | Mod: PBBFAC,,, | Performed by: UROLOGY

## 2022-03-07 PROCEDURE — 99213 PR OFFICE/OUTPT VISIT, EST, LEVL III, 20-29 MIN: ICD-10-PCS | Mod: S$GLB,,, | Performed by: UROLOGY

## 2022-03-07 PROCEDURE — 1126F AMNT PAIN NOTED NONE PRSNT: CPT | Mod: CPTII,S$GLB,, | Performed by: UROLOGY

## 2022-03-07 PROCEDURE — 3288F PR FALLS RISK ASSESSMENT DOCUMENTED: ICD-10-PCS | Mod: CPTII,S$GLB,, | Performed by: UROLOGY

## 2022-03-07 PROCEDURE — 1159F MED LIST DOCD IN RCRD: CPT | Mod: CPTII,S$GLB,, | Performed by: UROLOGY

## 2022-03-07 PROCEDURE — 1101F PT FALLS ASSESS-DOCD LE1/YR: CPT | Mod: CPTII,S$GLB,, | Performed by: UROLOGY

## 2022-03-07 PROCEDURE — 99999 PR PBB SHADOW E&M-EST. PATIENT-LVL III: CPT | Mod: PBBFAC,,, | Performed by: UROLOGY

## 2022-03-07 PROCEDURE — 1160F PR REVIEW ALL MEDS BY PRESCRIBER/CLIN PHARMACIST DOCUMENTED: ICD-10-PCS | Mod: CPTII,S$GLB,, | Performed by: UROLOGY

## 2022-03-07 NOTE — PROGRESS NOTES
Subjective:       Patient ID: Dutch Olivier is a 76 y.o. male.    Chief Complaint: Eligard injection    HPI     76-year-old with a distant history of prostate cancer.  He underwent radical prostatectomy in 2000 by Dr. Landers.  His PSA has been undetectable however in 2015 with begin to see a slight increase in his PSA.  His PSA increased to 21.5.  Bone scan was obtained which did show 2 suspicious lesions.  He began androgen ablation.  He was given his 1st Eligard injection in February 2021. He also began apalutamide in May 2021. He is overall doing well.  He has no bothersome urinary symptoms.  He denies hematuria and dysuria.   His last PSA is undetectable.      Component PSA Diagnostic   Latest Ref Rng & Units 0.00 - 4.00 ng/mL   8/25/2021 <0.01   6/2/2021 0.06   2/5/2021 23.5 (H)   2/2/2021 21.5 (H)   7/7/2020 1.1   5/9/2019 0.07   1/16/2018 0.04   1/12/2016 0.02       Review of Systems   Constitutional: Negative for fever.   Genitourinary: Negative for dysuria and hematuria.       Objective:      Physical Exam  Vitals reviewed.   Constitutional:       Appearance: He is well-developed.   Pulmonary:      Effort: Pulmonary effort is normal.   Abdominal:      Palpations: Abdomen is soft.   Skin:     Findings: No rash.   Neurological:      Mental Status: He is alert and oriented to person, place, and time.         I administered Eligard 45 mg to abdominal tissue.  No complication.       Assessment:       1. History of prostate cancer        Plan:       History of prostate cancer  -     Prostate Specific Antigen, Diagnostic; Future; Expected date: 03/07/2022  -     Prior authorization Order    Other orders  -     leuprolide (6 month) (ELIGARD) injection 45 mg      Follow up 6 months

## 2022-03-10 ENCOUNTER — LAB VISIT (OUTPATIENT)
Dept: LAB | Facility: HOSPITAL | Age: 77
End: 2022-03-10
Attending: UROLOGY
Payer: MEDICARE

## 2022-03-10 DIAGNOSIS — Z85.46 HISTORY OF PROSTATE CANCER: ICD-10-CM

## 2022-03-10 LAB — COMPLEXED PSA SERPL-MCNC: <0.01 NG/ML (ref 0–4)

## 2022-03-10 PROCEDURE — 84153 ASSAY OF PSA TOTAL: CPT | Performed by: UROLOGY

## 2022-03-10 PROCEDURE — 36415 COLL VENOUS BLD VENIPUNCTURE: CPT | Mod: PN | Performed by: UROLOGY

## 2022-04-05 ENCOUNTER — TELEPHONE (OUTPATIENT)
Dept: UROLOGY | Facility: CLINIC | Age: 77
End: 2022-04-05
Payer: MEDICARE

## 2022-04-13 ENCOUNTER — TELEPHONE (OUTPATIENT)
Dept: UROLOGY | Facility: CLINIC | Age: 77
End: 2022-04-13
Payer: MEDICARE

## 2022-04-13 NOTE — TELEPHONE ENCOUNTER
----- Message from Melina Nguyen sent at 4/13/2022  3:00 PM CDT -----  Contact: alexandr  Type:  Pharmacy Calling to Clarify an RX    Name of Caller:  alexandr HINSON   Pharmacy Name:  [facility]  Prescription Name:  ivisan  What do they need to clarify?: date provider signed script   Best Call Back Number:  [phone] ext [phone]  Additional Information:  Please Advise ---Thank you

## 2022-05-19 ENCOUNTER — OFFICE VISIT (OUTPATIENT)
Dept: FAMILY MEDICINE | Facility: CLINIC | Age: 77
End: 2022-05-19
Payer: MEDICARE

## 2022-05-19 VITALS
DIASTOLIC BLOOD PRESSURE: 76 MMHG | WEIGHT: 180.69 LBS | SYSTOLIC BLOOD PRESSURE: 134 MMHG | BODY MASS INDEX: 23.19 KG/M2 | HEIGHT: 74 IN

## 2022-05-19 DIAGNOSIS — Z11.59 NEED FOR HEPATITIS C SCREENING TEST: ICD-10-CM

## 2022-05-19 DIAGNOSIS — R73.02 GLUCOSE INTOLERANCE (IMPAIRED GLUCOSE TOLERANCE): ICD-10-CM

## 2022-05-19 DIAGNOSIS — I70.0 AORTO-ILIAC ATHEROSCLEROSIS: ICD-10-CM

## 2022-05-19 DIAGNOSIS — J44.9 CHRONIC OBSTRUCTIVE PULMONARY DISEASE, UNSPECIFIED COPD TYPE: ICD-10-CM

## 2022-05-19 DIAGNOSIS — C61 PROSTATE CANCER METASTATIC TO BONE: ICD-10-CM

## 2022-05-19 DIAGNOSIS — F17.200 TOBACCO USE DISORDER: ICD-10-CM

## 2022-05-19 DIAGNOSIS — R79.89 ABNORMAL CBC: ICD-10-CM

## 2022-05-19 DIAGNOSIS — I70.8 AORTO-ILIAC ATHEROSCLEROSIS: ICD-10-CM

## 2022-05-19 DIAGNOSIS — I10 ESSENTIAL HYPERTENSION: ICD-10-CM

## 2022-05-19 DIAGNOSIS — Z00.00 MEDICARE ANNUAL WELLNESS VISIT, SUBSEQUENT: Primary | ICD-10-CM

## 2022-05-19 DIAGNOSIS — C79.51 PROSTATE CANCER METASTATIC TO BONE: ICD-10-CM

## 2022-05-19 DIAGNOSIS — E78.5 HYPERLIPIDEMIA, UNSPECIFIED HYPERLIPIDEMIA TYPE: ICD-10-CM

## 2022-05-19 PROCEDURE — 1160F RVW MEDS BY RX/DR IN RCRD: CPT | Mod: CPTII,S$GLB,, | Performed by: INTERNAL MEDICINE

## 2022-05-19 PROCEDURE — 3078F DIAST BP <80 MM HG: CPT | Mod: CPTII,S$GLB,, | Performed by: INTERNAL MEDICINE

## 2022-05-19 PROCEDURE — 1159F PR MEDICATION LIST DOCUMENTED IN MEDICAL RECORD: ICD-10-PCS | Mod: CPTII,S$GLB,, | Performed by: INTERNAL MEDICINE

## 2022-05-19 PROCEDURE — 3075F PR MOST RECENT SYSTOLIC BLOOD PRESS GE 130-139MM HG: ICD-10-PCS | Mod: CPTII,S$GLB,, | Performed by: INTERNAL MEDICINE

## 2022-05-19 PROCEDURE — G0439 PR MEDICARE ANNUAL WELLNESS SUBSEQUENT VISIT: ICD-10-PCS | Mod: S$GLB,,, | Performed by: INTERNAL MEDICINE

## 2022-05-19 PROCEDURE — 1126F AMNT PAIN NOTED NONE PRSNT: CPT | Mod: CPTII,S$GLB,, | Performed by: INTERNAL MEDICINE

## 2022-05-19 PROCEDURE — 1159F MED LIST DOCD IN RCRD: CPT | Mod: CPTII,S$GLB,, | Performed by: INTERNAL MEDICINE

## 2022-05-19 PROCEDURE — G0439 PPPS, SUBSEQ VISIT: HCPCS | Mod: S$GLB,,, | Performed by: INTERNAL MEDICINE

## 2022-05-19 PROCEDURE — 1101F PR PT FALLS ASSESS DOC 0-1 FALLS W/OUT INJ PAST YR: ICD-10-PCS | Mod: CPTII,S$GLB,, | Performed by: INTERNAL MEDICINE

## 2022-05-19 PROCEDURE — 99499 UNLISTED E&M SERVICE: CPT | Mod: S$GLB,,, | Performed by: INTERNAL MEDICINE

## 2022-05-19 PROCEDURE — 3078F PR MOST RECENT DIASTOLIC BLOOD PRESSURE < 80 MM HG: ICD-10-PCS | Mod: CPTII,S$GLB,, | Performed by: INTERNAL MEDICINE

## 2022-05-19 PROCEDURE — 3288F PR FALLS RISK ASSESSMENT DOCUMENTED: ICD-10-PCS | Mod: CPTII,S$GLB,, | Performed by: INTERNAL MEDICINE

## 2022-05-19 PROCEDURE — 1101F PT FALLS ASSESS-DOCD LE1/YR: CPT | Mod: CPTII,S$GLB,, | Performed by: INTERNAL MEDICINE

## 2022-05-19 PROCEDURE — 99499 RISK ADDL DX/OHS AUDIT: ICD-10-PCS | Mod: S$GLB,,, | Performed by: INTERNAL MEDICINE

## 2022-05-19 PROCEDURE — 3075F SYST BP GE 130 - 139MM HG: CPT | Mod: CPTII,S$GLB,, | Performed by: INTERNAL MEDICINE

## 2022-05-19 PROCEDURE — 1160F PR REVIEW ALL MEDS BY PRESCRIBER/CLIN PHARMACIST DOCUMENTED: ICD-10-PCS | Mod: CPTII,S$GLB,, | Performed by: INTERNAL MEDICINE

## 2022-05-19 PROCEDURE — 1126F PR PAIN SEVERITY QUANTIFIED, NO PAIN PRESENT: ICD-10-PCS | Mod: CPTII,S$GLB,, | Performed by: INTERNAL MEDICINE

## 2022-05-19 PROCEDURE — 3288F FALL RISK ASSESSMENT DOCD: CPT | Mod: CPTII,S$GLB,, | Performed by: INTERNAL MEDICINE

## 2022-05-19 PROCEDURE — 99999 PR PBB SHADOW E&M-EST. PATIENT-LVL III: CPT | Mod: PBBFAC,,, | Performed by: INTERNAL MEDICINE

## 2022-05-19 PROCEDURE — 99999 PR PBB SHADOW E&M-EST. PATIENT-LVL III: ICD-10-PCS | Mod: PBBFAC,,, | Performed by: INTERNAL MEDICINE

## 2022-05-19 RX ORDER — PRAVASTATIN SODIUM 40 MG/1
40 TABLET ORAL DAILY
Qty: 90 TABLET | Refills: 1 | Status: SHIPPED | OUTPATIENT
Start: 2022-05-19 | End: 2022-08-17 | Stop reason: SDUPTHER

## 2022-05-19 RX ORDER — BENAZEPRIL HYDROCHLORIDE 20 MG/1
20 TABLET ORAL 2 TIMES DAILY
Qty: 180 TABLET | Refills: 1 | Status: SHIPPED | OUTPATIENT
Start: 2022-05-19 | End: 2022-08-17 | Stop reason: SDUPTHER

## 2022-05-19 NOTE — PROGRESS NOTES
HRA: patient feels overall is healthy.  Psychosocial and behavioral risks discussed.  BMI - 23  Weight loss discussed.   Diet - well balanced.   ADL: self sufficient in all  Instrumental ADL: patient is able to manage things like their medications and finances.    Memory or cognitive function - Patient has no issues with either   Ambulates normal. No recent falls.  Exercise - none   Depression screening is negative.  Hearing--no deficits.  Vision - glasses .   Incontinence - none    Preventative health needs discussed and patient was given a printed list of what they have received and what they will need with in the next 5-10 years.  Screening schedule reviewed with patient       Advanced Care directive: no   I have reviewed and updated the patient's current list of providers.       In addition to the patient's preventative review and discussion today, the patient also has other issues to discuss today with a separate summary of plan below:       Subjective:       Patient ID: Dutch Olivier is a 76 y.o. male.    Chief Complaint: Establish Care (Dr. Nicole pt)    PSA: Prostate cancer w mets //management urology injection Erleada   Colonoscopy:  yes in past approx 3  Dr Gallagher   Immunizations: Flu: yes  Tdap: rx pharm Pneumovax: 2012  Prevnar 13: 2017 Shingles: 2020 Covid: yes   Smoker:  Yes  Eye:  [facility]  Derm: Dr Myles Q 6 M      HPI  Here to establish care - previous Dr Nicole     Hypertension:  Controlled Rx benazepril 20 twice daily  Hyperlipidemia:   Rx pravastatin 40  Atherosclerosis:  CT abdominal aortic lesions.   Heart murmur:  Echo reviewed 2019. Mild sclerosis aortic valve, pulmonary pressures elevated.  Glucose intolerance:  Not sure fully fasting last lab 124/  check A1c next lab  History melanoma right ear:  Excision with chemo stopped early due to side effect.  Oncology Dr. Gongora        Review of Systems:  Review of Systems   Constitutional: Negative for chills.   HENT: Negative for drooling.   "  Eyes: Negative for pain.   Respiratory: Negative for choking.    Cardiovascular: Negative for chest pain.   Gastrointestinal: Negative for blood in stool.   Genitourinary: Negative for hematuria.   Musculoskeletal: Negative for joint swelling.   Skin: Negative for pallor.   Neurological: Negative for facial asymmetry.   Psychiatric/Behavioral: Negative for confusion.       Objective:     Vitals:    05/19/22 1206   BP: 134/76   Weight: 81.9 kg (180 lb 10.7 oz)   Height: 6' 2" (1.88 m)          Physical Exam  Vitals reviewed.   Constitutional:       Appearance: Normal appearance.   HENT:      Head: Normocephalic and atraumatic.      Mouth/Throat:      Pharynx: Oropharynx is clear.   Eyes:      Extraocular Movements: Extraocular movements intact.      Conjunctiva/sclera: Conjunctivae normal.      Pupils: Pupils are equal, round, and reactive to light.   Cardiovascular:      Rate and Rhythm: Normal rate and regular rhythm.      Heart sounds: Murmur heard.   Pulmonary:      Effort: Pulmonary effort is normal.      Breath sounds: Normal breath sounds.   Abdominal:      General: Bowel sounds are normal.      Palpations: Abdomen is soft.   Musculoskeletal:         General: Normal range of motion.      Cervical back: Normal range of motion and neck supple.   Skin:     General: Skin is warm and dry.   Neurological:      General: No focal deficit present.      Mental Status: He is alert and oriented to person, place, and time.   Psychiatric:         Mood and Affect: Mood normal.         Medication List with Changes/Refills   New Medications    DIPHTH,PERTUS,ACELL,,TETANUS (BOOSTRIX) 2.5-8-5 LF-MCG-LF/0.5ML SYRG INJECTION    Inject 0.5 mLs into the muscle once. for 1 dose   Current Medications    APALUTAMIDE (ERLEADA) 60 MG TAB    Take 240 mg by mouth once daily.    B COMPLEX VITAMINS TABLET    Take 1 tablet by mouth once daily.    MULTIVITAMIN CAPSULE    Take 1 capsule by mouth once daily.   Changed and/or Refilled " Medications    Modified Medication Previous Medication    BENAZEPRIL (LOTENSIN) 20 MG TABLET benazepriL (LOTENSIN) 20 MG tablet       Take 1 tablet (20 mg total) by mouth 2 (two) times daily.    Take 1 tablet (20 mg total) by mouth 2 (two) times daily.    PRAVASTATIN (PRAVACHOL) 40 MG TABLET pravastatin (PRAVACHOL) 40 MG tablet       Take 1 tablet (40 mg total) by mouth once daily.    Take 1 tablet (40 mg total) by mouth once daily.       Assessment & Plan:  1. Medicare annual wellness visit, subsequent  - CBC Auto Differential; Future  - Comprehensive Metabolic Panel; Future  - Lipid Panel; Future  - Hemoglobin A1C; Future  - TSH; Future  - Hepatitis C Antibody; Future    2. Essential hypertension  - TSH; Future  - X-Ray Chest PA And Lateral; Future    3. Hyperlipidemia, unspecified hyperlipidemia type  - Comprehensive Metabolic Panel; Future  - Lipid Panel; Future    4. Prostate cancer metastatic to bone    5. Glucose intolerance (impaired glucose tolerance)  - Hemoglobin A1C; Future    6. Aorto-iliac atherosclerosis    7. Tobacco use disorder  - X-Ray Chest PA And Lateral; Future    8. Abnormal CBC  - CBC Auto Differential; Future    9. Need for hepatitis C screening test  - Hepatitis C Antibody; Future    10. Chronic obstructive pulmonary disease, unspecified COPD type     Medicare annual wellness visit, subsequent  Comments:  will due labs   Orders:  -     CBC Auto Differential; Future; Expected date: 05/19/2022  -     Comprehensive Metabolic Panel; Future; Expected date: 05/19/2022  -     Lipid Panel; Future; Expected date: 05/19/2022  -     Hemoglobin A1C; Future; Expected date: 05/19/2022  -     TSH; Future; Expected date: 05/19/2022  -     Hepatitis C Antibody; Future; Expected date: 05/19/2022    Essential hypertension  -     TSH; Future; Expected date: 05/19/2022  -     X-Ray Chest PA And Lateral; Future; Expected date: 05/19/2022    Hyperlipidemia, unspecified hyperlipidemia type  -     Comprehensive  Metabolic Panel; Future; Expected date: 05/19/2022  -     Lipid Panel; Future; Expected date: 05/19/2022    Prostate cancer metastatic to bone    Glucose intolerance (impaired glucose tolerance)  -     Hemoglobin A1C; Future; Expected date: 05/19/2022    Aorto-iliac atherosclerosis    Tobacco use disorder  -     X-Ray Chest PA And Lateral; Future; Expected date: 05/19/2022    Abnormal CBC  -     CBC Auto Differential; Future; Expected date: 05/19/2022    Need for hepatitis C screening test  -     Hepatitis C Antibody; Future; Expected date: 05/19/2022    Chronic obstructive pulmonary disease, unspecified COPD type    Other orders  -     diphth,pertus,acell,,tetanus (BOOSTRIX) 2.5-8-5 Lf-mcg-Lf/0.5mL Syrg injection; Inject 0.5 mLs into the muscle once. for 1 dose  Dispense: 0.5 mL; Refill: 0  -     pravastatin (PRAVACHOL) 40 MG tablet; Take 1 tablet (40 mg total) by mouth once daily.  Dispense: 90 tablet; Refill: 1  -     benazepriL (LOTENSIN) 20 MG tablet; Take 1 tablet (20 mg total) by mouth 2 (two) times daily.  Dispense: 180 tablet; Refill: 1        Continue to work on regular exercise, maintain healthy weight, balanced diet. Avoid unhealthy habits: smoking, excessive alcohol intake.

## 2022-08-09 ENCOUNTER — HOSPITAL ENCOUNTER (OUTPATIENT)
Dept: RADIOLOGY | Facility: HOSPITAL | Age: 77
Discharge: HOME | End: 2022-08-09
Attending: INTERNAL MEDICINE
Payer: MEDICARE

## 2022-08-09 DIAGNOSIS — I10 ESSENTIAL HYPERTENSION: ICD-10-CM

## 2022-08-09 DIAGNOSIS — F17.200 TOBACCO USE DISORDER: ICD-10-CM

## 2022-08-09 PROCEDURE — 71046 X-RAY EXAM CHEST 2 VIEWS: CPT | Mod: TC,PN

## 2022-08-09 PROCEDURE — 71046 X-RAY EXAM CHEST 2 VIEWS: CPT | Mod: 26,,, | Performed by: RADIOLOGY

## 2022-08-09 PROCEDURE — 71046 XR CHEST PA AND LATERAL: ICD-10-PCS | Mod: 26,,, | Performed by: RADIOLOGY

## 2022-08-17 ENCOUNTER — OFFICE VISIT (OUTPATIENT)
Dept: FAMILY MEDICINE | Facility: CLINIC | Age: 77
End: 2022-08-17
Payer: MEDICARE

## 2022-08-17 VITALS
HEIGHT: 74 IN | OXYGEN SATURATION: 95 % | HEART RATE: 70 BPM | WEIGHT: 181.19 LBS | SYSTOLIC BLOOD PRESSURE: 138 MMHG | BODY MASS INDEX: 23.25 KG/M2 | DIASTOLIC BLOOD PRESSURE: 80 MMHG

## 2022-08-17 DIAGNOSIS — I35.8 AORTIC VALVE SCLEROSIS: Primary | ICD-10-CM

## 2022-08-17 DIAGNOSIS — E78.2 MIXED HYPERLIPIDEMIA: ICD-10-CM

## 2022-08-17 DIAGNOSIS — I10 ESSENTIAL HYPERTENSION: ICD-10-CM

## 2022-08-17 DIAGNOSIS — R01.1 HEART MURMUR: ICD-10-CM

## 2022-08-17 DIAGNOSIS — I35.0 AORTIC VALVE STENOSIS, ETIOLOGY OF CARDIAC VALVE DISEASE UNSPECIFIED: ICD-10-CM

## 2022-08-17 DIAGNOSIS — R73.02 GLUCOSE INTOLERANCE (IMPAIRED GLUCOSE TOLERANCE): ICD-10-CM

## 2022-08-17 PROCEDURE — 1126F AMNT PAIN NOTED NONE PRSNT: CPT | Mod: CPTII,S$GLB,, | Performed by: INTERNAL MEDICINE

## 2022-08-17 PROCEDURE — 99999 PR PBB SHADOW E&M-EST. PATIENT-LVL III: CPT | Mod: PBBFAC,,, | Performed by: INTERNAL MEDICINE

## 2022-08-17 PROCEDURE — 1159F PR MEDICATION LIST DOCUMENTED IN MEDICAL RECORD: ICD-10-PCS | Mod: CPTII,S$GLB,, | Performed by: INTERNAL MEDICINE

## 2022-08-17 PROCEDURE — 3075F PR MOST RECENT SYSTOLIC BLOOD PRESS GE 130-139MM HG: ICD-10-PCS | Mod: CPTII,S$GLB,, | Performed by: INTERNAL MEDICINE

## 2022-08-17 PROCEDURE — 99999 PR PBB SHADOW E&M-EST. PATIENT-LVL III: ICD-10-PCS | Mod: PBBFAC,,, | Performed by: INTERNAL MEDICINE

## 2022-08-17 PROCEDURE — 1101F PR PT FALLS ASSESS DOC 0-1 FALLS W/OUT INJ PAST YR: ICD-10-PCS | Mod: CPTII,S$GLB,, | Performed by: INTERNAL MEDICINE

## 2022-08-17 PROCEDURE — 1159F MED LIST DOCD IN RCRD: CPT | Mod: CPTII,S$GLB,, | Performed by: INTERNAL MEDICINE

## 2022-08-17 PROCEDURE — 3288F PR FALLS RISK ASSESSMENT DOCUMENTED: ICD-10-PCS | Mod: CPTII,S$GLB,, | Performed by: INTERNAL MEDICINE

## 2022-08-17 PROCEDURE — 1160F RVW MEDS BY RX/DR IN RCRD: CPT | Mod: CPTII,S$GLB,, | Performed by: INTERNAL MEDICINE

## 2022-08-17 PROCEDURE — 1101F PT FALLS ASSESS-DOCD LE1/YR: CPT | Mod: CPTII,S$GLB,, | Performed by: INTERNAL MEDICINE

## 2022-08-17 PROCEDURE — 3075F SYST BP GE 130 - 139MM HG: CPT | Mod: CPTII,S$GLB,, | Performed by: INTERNAL MEDICINE

## 2022-08-17 PROCEDURE — 1160F PR REVIEW ALL MEDS BY PRESCRIBER/CLIN PHARMACIST DOCUMENTED: ICD-10-PCS | Mod: CPTII,S$GLB,, | Performed by: INTERNAL MEDICINE

## 2022-08-17 PROCEDURE — 3079F DIAST BP 80-89 MM HG: CPT | Mod: CPTII,S$GLB,, | Performed by: INTERNAL MEDICINE

## 2022-08-17 PROCEDURE — 99214 OFFICE O/P EST MOD 30 MIN: CPT | Mod: S$GLB,,, | Performed by: INTERNAL MEDICINE

## 2022-08-17 PROCEDURE — 1126F PR PAIN SEVERITY QUANTIFIED, NO PAIN PRESENT: ICD-10-PCS | Mod: CPTII,S$GLB,, | Performed by: INTERNAL MEDICINE

## 2022-08-17 PROCEDURE — 3288F FALL RISK ASSESSMENT DOCD: CPT | Mod: CPTII,S$GLB,, | Performed by: INTERNAL MEDICINE

## 2022-08-17 PROCEDURE — 99214 PR OFFICE/OUTPT VISIT, EST, LEVL IV, 30-39 MIN: ICD-10-PCS | Mod: S$GLB,,, | Performed by: INTERNAL MEDICINE

## 2022-08-17 PROCEDURE — 3079F PR MOST RECENT DIASTOLIC BLOOD PRESSURE 80-89 MM HG: ICD-10-PCS | Mod: CPTII,S$GLB,, | Performed by: INTERNAL MEDICINE

## 2022-08-17 RX ORDER — BENAZEPRIL HYDROCHLORIDE 20 MG/1
20 TABLET ORAL 2 TIMES DAILY
Qty: 180 TABLET | Refills: 3 | Status: SHIPPED | OUTPATIENT
Start: 2022-08-17 | End: 2023-09-20 | Stop reason: SDUPTHER

## 2022-08-17 RX ORDER — PRAVASTATIN SODIUM 40 MG/1
40 TABLET ORAL DAILY
Qty: 90 TABLET | Refills: 3 | Status: SHIPPED | OUTPATIENT
Start: 2022-08-17 | End: 2023-09-20 | Stop reason: SDUPTHER

## 2022-08-17 NOTE — PROGRESS NOTES
"    Subjective:       Patient ID: Dutch Olivier is a 77 y.o. male.    Chief Complaint: Follow-up    PSA: < 0.01 3/2022 // + Prostate cancer w mets //mgmt urology injection Erleada Q 6 m  Colonoscopy:  yes in past approx 3  Dr Gallagher   Immunizations: Flu: yes  Tdap: rx pharm Pneumovax: 2012  Prevnar 13: 2017 Shingles: 2020 Covid: yes   Smoker:  Yes //   Eye:  [facility]  Derm: Dr Myles Q 6 M      HPI   lab follow-up  previous Dr Nicole     Hypertension:  Controlled Rx benazepril 20 twice daily  Hyperlipidemia:   Controlled Rx pravastatin 40// Tg 211   Atherosclerosis:  CT abdominal aortic lesions.   Heart murmur:  Echo reviewed 2019. Mild sclerosis aortic valve, pulmonary pressures elevated. Order new echo today.   Glucose intolerance:  G 110// a1c 4.9 //   abnormal CBC:  Mild elevated MCV.   Does drink 3-4 glasses of wine daily.  Recommend reduced to no more than 2 daily.   History melanoma right ear:  Excision with chemo stopped early due to side effect.  Oncology Dr. Gongora        Review of Systems:  Review of Systems   Constitutional: Negative for chills.   HENT: Negative for drooling.    Eyes: Negative for pain.   Respiratory: Negative for choking.    Cardiovascular: Negative for chest pain.   Gastrointestinal: Negative for blood in stool.   Genitourinary: Negative for hematuria.   Musculoskeletal: Negative for joint swelling.   Skin: Negative for pallor.   Neurological: Negative for facial asymmetry.   Psychiatric/Behavioral: Negative for confusion.       Objective:     Vitals:    08/17/22 1300   BP: 138/80   Pulse: 70   SpO2: 95%   Weight: 82.2 kg (181 lb 3.5 oz)   Height: 6' 2" (1.88 m)          Physical Exam  Vitals reviewed.   Constitutional:       Appearance: Normal appearance.   HENT:      Head: Normocephalic and atraumatic.      Mouth/Throat:      Pharynx: Oropharynx is clear.   Eyes:      Extraocular Movements: Extraocular movements intact.      Conjunctiva/sclera: Conjunctivae normal.      Pupils: " Pupils are equal, round, and reactive to light.   Cardiovascular:      Rate and Rhythm: Normal rate and regular rhythm.      Heart sounds: Murmur heard.   Pulmonary:      Effort: Pulmonary effort is normal.      Breath sounds: Normal breath sounds.   Abdominal:      General: Bowel sounds are normal.      Palpations: Abdomen is soft.   Musculoskeletal:         General: Normal range of motion.      Cervical back: Normal range of motion and neck supple.   Skin:     General: Skin is warm and dry.   Neurological:      General: No focal deficit present.      Mental Status: He is alert and oriented to person, place, and time.   Psychiatric:         Mood and Affect: Mood normal.         Medication List with Changes/Refills   Current Medications    APALUTAMIDE (ERLEADA) 60 MG TAB    Take 240 mg by mouth once daily.    B COMPLEX VITAMINS TABLET    Take 1 tablet by mouth once daily.    MULTIVITAMIN CAPSULE    Take 1 capsule by mouth once daily.   Changed and/or Refilled Medications    Modified Medication Previous Medication    BENAZEPRIL (LOTENSIN) 20 MG TABLET benazepriL (LOTENSIN) 20 MG tablet       Take 1 tablet (20 mg total) by mouth 2 (two) times daily.    Take 1 tablet (20 mg total) by mouth 2 (two) times daily.    PRAVASTATIN (PRAVACHOL) 40 MG TABLET pravastatin (PRAVACHOL) 40 MG tablet       Take 1 tablet (40 mg total) by mouth once daily.    Take 1 tablet (40 mg total) by mouth once daily.       Assessment & Plan:  1. Aortic valve sclerosis    2. Heart murmur  - Echo; Future    3. Mixed hyperlipidemia  - pravastatin (PRAVACHOL) 40 MG tablet; Take 1 tablet (40 mg total) by mouth once daily.  Dispense: 90 tablet; Refill: 3    4. Essential hypertension  - benazepriL (LOTENSIN) 20 MG tablet; Take 1 tablet (20 mg total) by mouth 2 (two) times daily.  Dispense: 180 tablet; Refill: 3    5. Aortic valve stenosis, etiology of cardiac valve disease unspecified  - Echo; Future    6. Glucose intolerance (impaired glucose  tolerance)     Aortic valve sclerosis    Heart murmur  -     Echo; Future    Mixed hyperlipidemia  -     pravastatin (PRAVACHOL) 40 MG tablet; Take 1 tablet (40 mg total) by mouth once daily.  Dispense: 90 tablet; Refill: 3    Essential hypertension  -     benazepriL (LOTENSIN) 20 MG tablet; Take 1 tablet (20 mg total) by mouth 2 (two) times daily.  Dispense: 180 tablet; Refill: 3    Aortic valve stenosis, etiology of cardiac valve disease unspecified  -     Echo; Future    Glucose intolerance (impaired glucose tolerance)        Continue to work on regular exercise, maintain healthy weight, balanced diet. Avoid unhealthy habits: smoking, excessive alcohol intake.

## 2022-09-07 ENCOUNTER — OFFICE VISIT (OUTPATIENT)
Dept: UROLOGY | Facility: CLINIC | Age: 77
End: 2022-09-07
Payer: MEDICARE

## 2022-09-07 ENCOUNTER — LAB VISIT (OUTPATIENT)
Dept: LAB | Facility: HOSPITAL | Age: 77
End: 2022-09-07
Attending: UROLOGY
Payer: MEDICARE

## 2022-09-07 VITALS — HEIGHT: 74 IN | BODY MASS INDEX: 23.25 KG/M2 | WEIGHT: 181.19 LBS

## 2022-09-07 DIAGNOSIS — C61 PROSTATE CANCER: ICD-10-CM

## 2022-09-07 DIAGNOSIS — C61 PROSTATE CANCER: Primary | ICD-10-CM

## 2022-09-07 LAB — COMPLEXED PSA SERPL-MCNC: <0.01 NG/ML (ref 0–4)

## 2022-09-07 PROCEDURE — 3288F FALL RISK ASSESSMENT DOCD: CPT | Mod: CPTII,S$GLB,, | Performed by: UROLOGY

## 2022-09-07 PROCEDURE — 99999 PR PBB SHADOW E&M-EST. PATIENT-LVL III: CPT | Mod: PBBFAC,,, | Performed by: UROLOGY

## 2022-09-07 PROCEDURE — 1159F PR MEDICATION LIST DOCUMENTED IN MEDICAL RECORD: ICD-10-PCS | Mod: CPTII,S$GLB,, | Performed by: UROLOGY

## 2022-09-07 PROCEDURE — 96402 CHEMO HORMON ANTINEOPL SQ/IM: CPT | Mod: S$GLB,,, | Performed by: UROLOGY

## 2022-09-07 PROCEDURE — 1101F PT FALLS ASSESS-DOCD LE1/YR: CPT | Mod: CPTII,S$GLB,, | Performed by: UROLOGY

## 2022-09-07 PROCEDURE — 1160F PR REVIEW ALL MEDS BY PRESCRIBER/CLIN PHARMACIST DOCUMENTED: ICD-10-PCS | Mod: CPTII,S$GLB,, | Performed by: UROLOGY

## 2022-09-07 PROCEDURE — 99499 UNLISTED E&M SERVICE: CPT | Mod: S$GLB,,, | Performed by: UROLOGY

## 2022-09-07 PROCEDURE — 1126F PR PAIN SEVERITY QUANTIFIED, NO PAIN PRESENT: ICD-10-PCS | Mod: CPTII,S$GLB,, | Performed by: UROLOGY

## 2022-09-07 PROCEDURE — 1126F AMNT PAIN NOTED NONE PRSNT: CPT | Mod: CPTII,S$GLB,, | Performed by: UROLOGY

## 2022-09-07 PROCEDURE — 84153 ASSAY OF PSA TOTAL: CPT | Performed by: UROLOGY

## 2022-09-07 PROCEDURE — 1101F PR PT FALLS ASSESS DOC 0-1 FALLS W/OUT INJ PAST YR: ICD-10-PCS | Mod: CPTII,S$GLB,, | Performed by: UROLOGY

## 2022-09-07 PROCEDURE — 1159F MED LIST DOCD IN RCRD: CPT | Mod: CPTII,S$GLB,, | Performed by: UROLOGY

## 2022-09-07 PROCEDURE — 36415 COLL VENOUS BLD VENIPUNCTURE: CPT | Mod: PO | Performed by: UROLOGY

## 2022-09-07 PROCEDURE — 96402 PR CHEMOTHER HORMON ANTINEOPL SUB-Q/IM: ICD-10-PCS | Mod: S$GLB,,, | Performed by: UROLOGY

## 2022-09-07 PROCEDURE — 3288F PR FALLS RISK ASSESSMENT DOCUMENTED: ICD-10-PCS | Mod: CPTII,S$GLB,, | Performed by: UROLOGY

## 2022-09-07 PROCEDURE — 99999 PR PBB SHADOW E&M-EST. PATIENT-LVL III: ICD-10-PCS | Mod: PBBFAC,,, | Performed by: UROLOGY

## 2022-09-07 PROCEDURE — 1160F RVW MEDS BY RX/DR IN RCRD: CPT | Mod: CPTII,S$GLB,, | Performed by: UROLOGY

## 2022-09-07 PROCEDURE — 99499 NO LOS: ICD-10-PCS | Mod: S$GLB,,, | Performed by: UROLOGY

## 2022-09-07 NOTE — PROGRESS NOTES
Subjective:       Patient ID: Dutch Olivier is a 77 y.o. male.    Chief Complaint: Lupron     HPI    77-year-old with a distant history of prostate cancer.  He underwent radical prostatectomy in 2000 by Dr. Landers.  His PSA had been undetectable however in 2015 we begin to see a slight increase in his PSA.  His PSA increased to 21.5.  Bone scan was obtained which did show 2 suspicious lesions.  He began androgen ablation.  He was given his 1st Eligard injection in February 2021. He also began apalutamide in May 2021. He is overall doing well.  He has no bothersome urinary symptoms.  He denies hematuria and dysuria.   His last PSA is undetectable.       Component PSA Diagnostic   Latest Ref Rng & Units 0.00 - 4.00 ng/mL   3/10/2022 <0.01   8/25/2021 <0.01   6/2/2021 0.06   2/5/2021 23.5 (H)   2/2/2021 21.5 (H)   7/7/2020 1.1   5/9/2019 0.07   1/16/2018 0.04   1/12/2016 0.02       Review of Systems   Constitutional:  Negative for fever.   Genitourinary:  Negative for dysuria and hematuria.     Objective:      Physical Exam  Vitals reviewed.   Constitutional:       Appearance: He is well-developed.   Pulmonary:      Effort: Pulmonary effort is normal.   Skin:     Findings: No rash.   Neurological:      Mental Status: He is alert and oriented to person, place, and time.         I administered Lupron 45 mg to right glut.  No complication.     Assessment:       1. Prostate cancer        Plan:       Prostate cancer  -     Prostate Specific Antigen, Diagnostic; Future; Expected date: 09/07/2022    Other orders  -     leuprolide acetate (6 month) injection 45 mg      Update PSA today.  Follow up 6 months for Lupron injection

## 2022-10-11 ENCOUNTER — CLINICAL SUPPORT (OUTPATIENT)
Dept: CARDIOLOGY | Facility: HOSPITAL | Age: 77
End: 2022-10-11
Attending: INTERNAL MEDICINE
Payer: MEDICARE

## 2022-10-11 VITALS — BODY MASS INDEX: 23.23 KG/M2 | WEIGHT: 181 LBS | HEIGHT: 74 IN

## 2022-10-11 DIAGNOSIS — I35.0 AORTIC VALVE STENOSIS, ETIOLOGY OF CARDIAC VALVE DISEASE UNSPECIFIED: ICD-10-CM

## 2022-10-11 DIAGNOSIS — R01.1 HEART MURMUR: ICD-10-CM

## 2022-10-11 PROCEDURE — 93306 TTE W/DOPPLER COMPLETE: CPT | Mod: 26,,, | Performed by: INTERNAL MEDICINE

## 2022-10-11 PROCEDURE — 93306 TTE W/DOPPLER COMPLETE: CPT | Mod: PO

## 2022-10-11 PROCEDURE — 93306 ECHO (CUPID ONLY): ICD-10-PCS | Mod: 26,,, | Performed by: INTERNAL MEDICINE

## 2022-10-12 LAB
ASCENDING AORTA: 3.76 CM
AV INDEX (PROSTH): 0.31
AV MEAN GRADIENT: 24 MMHG
AV PEAK GRADIENT: 40 MMHG
AV REGURGITATION PRESSURE HALF TIME: 424.29 MS
AV VALVE AREA: 1.44 CM2
AV VELOCITY RATIO: 0.27
BSA FOR ECHO PROCEDURE: 2.07 M2
CV ECHO LV RWT: 0.39 CM
DOP CALC AO PEAK VEL: 3.17 M/S
DOP CALC AO VTI: 80 CM
DOP CALC LVOT AREA: 4.6 CM2
DOP CALC LVOT DIAMETER: 2.42 CM
DOP CALC LVOT PEAK VEL: 0.87 M/S
DOP CALC LVOT STROKE VOLUME: 115.39 CM3
DOP CALCLVOT PEAK VEL VTI: 25.1 CM
E WAVE DECELERATION TIME: 362.12 MSEC
E/A RATIO: 0.75
E/E' RATIO: 9 M/S
ECHO LV POSTERIOR WALL: 1.07 CM (ref 0.6–1.1)
EJECTION FRACTION: 55 %
FRACTIONAL SHORTENING: 28 % (ref 28–44)
INTERVENTRICULAR SEPTUM: 1.13 CM (ref 0.6–1.1)
LA MAJOR: 4.24 CM
LA MINOR: 4.8 CM
LA WIDTH: 3.5 CM
LEFT ATRIUM SIZE: 3.6 CM
LEFT ATRIUM VOLUME INDEX: 23.2 ML/M2
LEFT ATRIUM VOLUME: 48.22 CM3
LEFT INTERNAL DIMENSION IN SYSTOLE: 3.96 CM (ref 2.1–4)
LEFT VENTRICLE DIASTOLIC VOLUME INDEX: 70.78 ML/M2
LEFT VENTRICLE DIASTOLIC VOLUME: 147.22 ML
LEFT VENTRICLE MASS INDEX: 116 G/M2
LEFT VENTRICLE SYSTOLIC VOLUME INDEX: 32.9 ML/M2
LEFT VENTRICLE SYSTOLIC VOLUME: 68.34 ML
LEFT VENTRICULAR INTERNAL DIMENSION IN DIASTOLE: 5.5 CM (ref 3.5–6)
LEFT VENTRICULAR MASS: 242.01 G
LV LATERAL E/E' RATIO: 9 M/S
LV SEPTAL E/E' RATIO: 9 M/S
LVOT MG: 1.83 MMHG
LVOT MV: 0.64 CM/S
MV PEAK A VEL: 0.72 M/S
MV PEAK E VEL: 0.54 M/S
PISA AR MAX VEL: 1.91 M/S
PISA TR MAX VEL: 2.73 M/S
RA MAJOR: 4.14 CM
RA PRESSURE: 3 MMHG
RIGHT VENTRICULAR END-DIASTOLIC DIMENSION: 4.31 CM
RIGHT VENTRICULAR LENGTH IN DIASTOLE (APICAL 4-CHAMBER VIEW): 7.95 CM
RV MID DIAMA: 25.29 CM
RV TISSUE DOPPLER FREE WALL SYSTOLIC VELOCITY 1 (APICAL 4 CHAMBER VIEW): 0.01 CM/S
SINUS: 3.18 CM
STJ: 3.55 CM
TDI LATERAL: 0.06 M/S
TDI SEPTAL: 0.06 M/S
TDI: 0.06 M/S
TR MAX PG: 30 MMHG
TRICUSPID ANNULAR PLANE SYSTOLIC EXCURSION: 2.53 CM
TV REST PULMONARY ARTERY PRESSURE: 33 MMHG

## 2022-10-25 DIAGNOSIS — I35.0 AORTIC VALVE STENOSIS, ETIOLOGY OF CARDIAC VALVE DISEASE UNSPECIFIED: Primary | ICD-10-CM

## 2022-10-25 DIAGNOSIS — I27.20 PULMONARY HYPERTENSION: ICD-10-CM

## 2022-10-27 ENCOUNTER — TELEPHONE (OUTPATIENT)
Dept: FAMILY MEDICINE | Facility: CLINIC | Age: 77
End: 2022-10-27
Payer: MEDICARE

## 2022-10-27 ENCOUNTER — PATIENT MESSAGE (OUTPATIENT)
Dept: FAMILY MEDICINE | Facility: CLINIC | Age: 77
End: 2022-10-27
Payer: MEDICARE

## 2022-11-29 ENCOUNTER — OFFICE VISIT (OUTPATIENT)
Dept: CARDIOLOGY | Facility: CLINIC | Age: 77
End: 2022-11-29
Payer: MEDICARE

## 2022-11-29 VITALS
HEIGHT: 74 IN | BODY MASS INDEX: 23.8 KG/M2 | HEART RATE: 73 BPM | SYSTOLIC BLOOD PRESSURE: 158 MMHG | DIASTOLIC BLOOD PRESSURE: 78 MMHG | WEIGHT: 185.44 LBS

## 2022-11-29 DIAGNOSIS — J44.9 CHRONIC OBSTRUCTIVE PULMONARY DISEASE, UNSPECIFIED COPD TYPE: Primary | ICD-10-CM

## 2022-11-29 DIAGNOSIS — R01.1 HEART MURMUR: ICD-10-CM

## 2022-11-29 DIAGNOSIS — C61 PROSTATE CANCER METASTATIC TO BONE: ICD-10-CM

## 2022-11-29 DIAGNOSIS — Z85.820 HISTORY OF MALIGNANT MELANOMA: ICD-10-CM

## 2022-11-29 DIAGNOSIS — I27.20 PULMONARY HYPERTENSION: ICD-10-CM

## 2022-11-29 DIAGNOSIS — I70.0 AORTO-ILIAC ATHEROSCLEROSIS: ICD-10-CM

## 2022-11-29 DIAGNOSIS — E78.5 HYPERLIPIDEMIA, UNSPECIFIED HYPERLIPIDEMIA TYPE: ICD-10-CM

## 2022-11-29 DIAGNOSIS — I10 ESSENTIAL HYPERTENSION: ICD-10-CM

## 2022-11-29 DIAGNOSIS — F17.200 TOBACCO USE DISORDER: ICD-10-CM

## 2022-11-29 DIAGNOSIS — I70.8 AORTO-ILIAC ATHEROSCLEROSIS: ICD-10-CM

## 2022-11-29 DIAGNOSIS — I35.0 AORTIC VALVE STENOSIS, ETIOLOGY OF CARDIAC VALVE DISEASE UNSPECIFIED: ICD-10-CM

## 2022-11-29 DIAGNOSIS — C79.51 PROSTATE CANCER METASTATIC TO BONE: ICD-10-CM

## 2022-11-29 DIAGNOSIS — Z86.002 HISTORY OF CARCINOMA IN SITU OF PROSTATE: ICD-10-CM

## 2022-11-29 DIAGNOSIS — I35.8 AORTIC VALVE SCLEROSIS: ICD-10-CM

## 2022-11-29 PROCEDURE — 3288F PR FALLS RISK ASSESSMENT DOCUMENTED: ICD-10-PCS | Mod: CPTII,S$GLB,, | Performed by: INTERNAL MEDICINE

## 2022-11-29 PROCEDURE — 99204 PR OFFICE/OUTPT VISIT, NEW, LEVL IV, 45-59 MIN: ICD-10-PCS | Mod: S$GLB,,, | Performed by: INTERNAL MEDICINE

## 2022-11-29 PROCEDURE — 99999 PR PBB SHADOW E&M-EST. PATIENT-LVL III: ICD-10-PCS | Mod: PBBFAC,,, | Performed by: INTERNAL MEDICINE

## 2022-11-29 PROCEDURE — 99999 PR PBB SHADOW E&M-EST. PATIENT-LVL III: CPT | Mod: PBBFAC,,, | Performed by: INTERNAL MEDICINE

## 2022-11-29 PROCEDURE — 1126F AMNT PAIN NOTED NONE PRSNT: CPT | Mod: CPTII,S$GLB,, | Performed by: INTERNAL MEDICINE

## 2022-11-29 PROCEDURE — 1159F MED LIST DOCD IN RCRD: CPT | Mod: CPTII,S$GLB,, | Performed by: INTERNAL MEDICINE

## 2022-11-29 PROCEDURE — 1101F PR PT FALLS ASSESS DOC 0-1 FALLS W/OUT INJ PAST YR: ICD-10-PCS | Mod: CPTII,S$GLB,, | Performed by: INTERNAL MEDICINE

## 2022-11-29 PROCEDURE — 3078F DIAST BP <80 MM HG: CPT | Mod: CPTII,S$GLB,, | Performed by: INTERNAL MEDICINE

## 2022-11-29 PROCEDURE — 99499 RISK ADDL DX/OHS AUDIT: ICD-10-PCS | Mod: S$GLB,,, | Performed by: INTERNAL MEDICINE

## 2022-11-29 PROCEDURE — 1160F PR REVIEW ALL MEDS BY PRESCRIBER/CLIN PHARMACIST DOCUMENTED: ICD-10-PCS | Mod: CPTII,S$GLB,, | Performed by: INTERNAL MEDICINE

## 2022-11-29 PROCEDURE — 99204 OFFICE O/P NEW MOD 45 MIN: CPT | Mod: S$GLB,,, | Performed by: INTERNAL MEDICINE

## 2022-11-29 PROCEDURE — 99499 UNLISTED E&M SERVICE: CPT | Mod: S$GLB,,, | Performed by: INTERNAL MEDICINE

## 2022-11-29 PROCEDURE — 1101F PT FALLS ASSESS-DOCD LE1/YR: CPT | Mod: CPTII,S$GLB,, | Performed by: INTERNAL MEDICINE

## 2022-11-29 PROCEDURE — 1126F PR PAIN SEVERITY QUANTIFIED, NO PAIN PRESENT: ICD-10-PCS | Mod: CPTII,S$GLB,, | Performed by: INTERNAL MEDICINE

## 2022-11-29 PROCEDURE — 1160F RVW MEDS BY RX/DR IN RCRD: CPT | Mod: CPTII,S$GLB,, | Performed by: INTERNAL MEDICINE

## 2022-11-29 PROCEDURE — 1159F PR MEDICATION LIST DOCUMENTED IN MEDICAL RECORD: ICD-10-PCS | Mod: CPTII,S$GLB,, | Performed by: INTERNAL MEDICINE

## 2022-11-29 PROCEDURE — 3288F FALL RISK ASSESSMENT DOCD: CPT | Mod: CPTII,S$GLB,, | Performed by: INTERNAL MEDICINE

## 2022-11-29 PROCEDURE — 3077F PR MOST RECENT SYSTOLIC BLOOD PRESSURE >= 140 MM HG: ICD-10-PCS | Mod: CPTII,S$GLB,, | Performed by: INTERNAL MEDICINE

## 2022-11-29 PROCEDURE — 3078F PR MOST RECENT DIASTOLIC BLOOD PRESSURE < 80 MM HG: ICD-10-PCS | Mod: CPTII,S$GLB,, | Performed by: INTERNAL MEDICINE

## 2022-11-29 PROCEDURE — 3077F SYST BP >= 140 MM HG: CPT | Mod: CPTII,S$GLB,, | Performed by: INTERNAL MEDICINE

## 2022-11-29 NOTE — PROGRESS NOTES
Subjective:    Patient ID:  Dutch Olivier is a 77 y.o. male patient here for evaluation Establish Care and Aortic Stenosis      History of Present Illness:  New patient cardiac evaluation.  Abnormal echo with moderate AS.  Known aortic stenosis since 2019.  No ischemic heart disease.  No arrhythmic heart disease.  Overall patient is asymptomatic.  Stable HATCH.  No syncope/presyncope.  No angina chest pain.  No prior history of known congestive heart failure.  Risk factors include hypertension, dyslipidemia, ongoing tobacco use.  Family history.    Other concomitant medical problems include history of prostate cancer under treatment, history of melanoma.  History of COPD.             Review of patient's allergies indicates:   Allergen Reactions    Aspirin Hives       Past Medical History:   Diagnosis Date    Amblyopia     OS    Cancer     melanoma, prostate    Cataract     Complication of anesthesia     says he sometimes is slow to awaken    COPD (chronic obstructive pulmonary disease)     no oxygen, no inhalers, or nebulizers    Diverticulosis     History of colonic polyps     History of malignant melanoma 01/01/2011    right ear, had chemo//Dr Gongora    HTN (hypertension)     Hyperlipemia      Past Surgical History:   Procedure Laterality Date    EXTERNAL EAR SURGERY  2011    melanoma removed right ear    HERNIA REPAIR      [facility]    PORTACATH PLACEMENT  2011    later removed    PROSTATE SURGERY  2003    prostatectomy     Social History     Tobacco Use    Smoking status: Every Day     Packs/day: 1.00     Types: Cigarettes    Smokeless tobacco: Never   Substance Use Topics    Alcohol use: Yes     Comment: 3-4 glasses nightly wine    Drug use: No        Review of Systems:    As noted in HPI in addition         REVIEW OF SYSTEMS  Review of Systems   Constitutional: Negative for decreased appetite, diaphoresis, night sweats, weight gain and weight loss.   HENT:  Negative for nosebleeds and odynophagia.    Eyes:   Negative for double vision and photophobia.   Cardiovascular:  Negative for chest pain, claudication, cyanosis, dyspnea on exertion, irregular heartbeat, leg swelling, near-syncope, orthopnea, palpitations, paroxysmal nocturnal dyspnea and syncope.   Respiratory:  Negative for cough, hemoptysis, shortness of breath and wheezing.    Hematologic/Lymphatic: Negative for adenopathy.   Skin:  Negative for flushing, skin cancer and suspicious lesions.   Musculoskeletal:  Negative for gout, myalgias and neck pain.   Gastrointestinal:  Negative for abdominal pain, heartburn, hematemesis and hematochezia.   Genitourinary:  Negative for bladder incontinence, hesitancy and nocturia.   Neurological:  Negative for focal weakness, headaches, light-headedness and paresthesias.   Psychiatric/Behavioral:  Negative for memory loss and substance abuse.      Objective:        Vitals:    11/29/22 1356   BP: (!) 158/78   Pulse: 73       Lab Results   Component Value Date    WBC 7.96 08/09/2022    HGB 14.5 08/09/2022    HCT 44.1 08/09/2022     08/09/2022    CHOL 211 (H) 08/09/2022    TRIG 211 (H) 08/09/2022    HDL 63 08/09/2022    ALT 12 08/09/2022    AST 15 08/09/2022     08/09/2022    K 4.7 08/09/2022     08/09/2022    CREATININE 0.8 08/09/2022    BUN 11 08/09/2022    CO2 25 08/09/2022    TSH 1.005 08/09/2022    PSA <0.010 09/12/2012    HGBA1C 4.9 08/09/2022      CARDIOGRAM RESULTS  Results for orders placed in visit on 10/11/22    Echo    Interpretation Summary  · The left ventricle is normal in size with eccentric hypertrophy and normal systolic function.  · The estimated ejection fraction is 55%.  · Normal left ventricular diastolic function.  · Normal right ventricular size with normal right ventricular systolic function.  · There is mild-to-moderate aortic valve stenosis.  · Aortic valve area is 1.44 cm2; peak velocity is 3.17 m/s; mean gradient is 24 mmHg.  · Mild mitral regurgitation.  · Mild tricuspid  regurgitation.  · Normal central venous pressure (3 mmHg).  · The estimated PA systolic pressure is 33 mmHg.        CURRENT/PREVIOUS VISIT EKG  No results found for this or any previous visit.  No valid procedures specified.   No results found for this or any previous visit.    No valid procedures specified.    PHYSICAL EXAM  CONSTITUTIONAL: Well built, well nourished in no apparent distress  NECK: no carotid bruit, no JVD  LUNGS: CTA  CHEST WALL: no tenderness,  HEART: regular rate and rhythm, S1, S2 normal, no murmur, click, rub or gallop   ABDOMEN: soft, non-tender; bowel sounds normal; no masses,  no organomegaly  EXTREMITIES: Extremities normal, no edema, no calf tenderness noted  VASCULAR EXAM: 2 PLUS UPPER AND LOWER EXT PULSES  NEURO: AAO X 3, NO ACUTE FOCAL OR LATERALIZING FINDINGS    I HAVE REVIEWED :    The vital signs, nurses notes, and all the pertinent radiology and labs.         Current Outpatient Medications   Medication Instructions    apalutamide (ERLEADA) 240 mg, Oral, Daily    b complex vitamins tablet 1 tablet, Oral, Daily,      benazepriL (LOTENSIN) 20 mg, Oral, 2 times daily    multivitamin capsule 1 capsule, Oral, Daily,      pravastatin (PRAVACHOL) 40 mg, Oral, Daily          Assessment:   Valvular heart disease, mild moderate aortic stenosis by recent echo.  Preserved ejection fraction.  Hypertension, dyslipidemia  Ongoing tobacco use.  COPD.  History prostate cancer ongoing treatment.  Past history of melanoma.        Plan:   Patient follow-up for prostate cancer.  CTA of the abdomen and pelvis, chest x-ray bone scans have been negative for acute pathology.  No evidence of aneurysm.  Suggest screening carotid ultrasound, Lexiscan.  Return to clinic results.          No follow-ups on file.

## 2022-12-20 ENCOUNTER — TELEPHONE (OUTPATIENT)
Dept: CARDIOLOGY | Facility: CLINIC | Age: 77
End: 2022-12-20
Payer: MEDICARE

## 2022-12-20 NOTE — TELEPHONE ENCOUNTER
----- Message from Jillian Calderon sent at 12/20/2022  1:34 PM CST -----  Regarding: needs to r/s 12/23 tests  Type: Needs Medical Advice  Who Called:  Dutch    Jules Call Back Number: [phone]    Additional Information: Pt had 4 tests scheduled for 12/23 however due to the weather he had to cancel, if someone could give him a call to r/s at earliest conviennce

## 2022-12-29 ENCOUNTER — CLINICAL SUPPORT (OUTPATIENT)
Dept: CARDIOLOGY | Facility: HOSPITAL | Age: 77
End: 2022-12-29
Attending: INTERNAL MEDICINE
Payer: MEDICARE

## 2022-12-29 VITALS — WEIGHT: 185 LBS | HEIGHT: 74 IN | BODY MASS INDEX: 23.74 KG/M2

## 2022-12-29 DIAGNOSIS — C61 PROSTATE CANCER METASTATIC TO BONE: ICD-10-CM

## 2022-12-29 DIAGNOSIS — C79.51 PROSTATE CANCER METASTATIC TO BONE: ICD-10-CM

## 2022-12-29 DIAGNOSIS — F17.200 TOBACCO USE DISORDER: ICD-10-CM

## 2022-12-29 DIAGNOSIS — Z86.002 HISTORY OF CARCINOMA IN SITU OF PROSTATE: ICD-10-CM

## 2022-12-29 DIAGNOSIS — I35.8 AORTIC VALVE SCLEROSIS: ICD-10-CM

## 2022-12-29 DIAGNOSIS — I10 ESSENTIAL HYPERTENSION: ICD-10-CM

## 2022-12-29 DIAGNOSIS — I70.8 AORTO-ILIAC ATHEROSCLEROSIS: ICD-10-CM

## 2022-12-29 DIAGNOSIS — Z85.820 HISTORY OF MALIGNANT MELANOMA: ICD-10-CM

## 2022-12-29 DIAGNOSIS — I70.0 AORTO-ILIAC ATHEROSCLEROSIS: ICD-10-CM

## 2022-12-29 DIAGNOSIS — E78.5 HYPERLIPIDEMIA, UNSPECIFIED HYPERLIPIDEMIA TYPE: ICD-10-CM

## 2022-12-29 DIAGNOSIS — I27.20 PULMONARY HYPERTENSION: ICD-10-CM

## 2022-12-29 DIAGNOSIS — J44.9 CHRONIC OBSTRUCTIVE PULMONARY DISEASE, UNSPECIFIED COPD TYPE: ICD-10-CM

## 2022-12-29 DIAGNOSIS — R01.1 HEART MURMUR: ICD-10-CM

## 2022-12-29 DIAGNOSIS — I35.0 AORTIC VALVE STENOSIS, ETIOLOGY OF CARDIAC VALVE DISEASE UNSPECIFIED: ICD-10-CM

## 2022-12-29 LAB
LEFT CBA DIAS: 9 CM/S
LEFT CBA SYS: 42 CM/S
LEFT CCA DIST DIAS: 11 CM/S
LEFT CCA DIST SYS: 56 CM/S
LEFT CCA MID DIAS: 9 CM/S
LEFT CCA MID SYS: 50 CM/S
LEFT CCA PROX DIAS: 8 CM/S
LEFT CCA PROX SYS: 65 CM/S
LEFT ECA DIAS: 10 CM/S
LEFT ECA SYS: 82 CM/S
LEFT ICA DIST DIAS: 35 CM/S
LEFT ICA DIST SYS: 88 CM/S
LEFT ICA MID DIAS: 27 CM/S
LEFT ICA MID SYS: 87 CM/S
LEFT ICA PROX DIAS: 14 CM/S
LEFT ICA PROX SYS: 44 CM/S
LEFT VERTEBRAL DIAS: 15 CM/S
LEFT VERTEBRAL SYS: 53 CM/S
OHS CV CAROTID RIGHT ICA EDV HIGHEST: 22
OHS CV CAROTID ULTRASOUND LEFT ICA/CCA RATIO: 1.57
OHS CV CAROTID ULTRASOUND RIGHT ICA/CCA RATIO: 1.91
OHS CV PV CAROTID LEFT HIGHEST CCA: 65
OHS CV PV CAROTID LEFT HIGHEST ICA: 88
OHS CV PV CAROTID RIGHT HIGHEST CCA: 58
OHS CV PV CAROTID RIGHT HIGHEST ICA: 82
OHS CV US CAROTID LEFT HIGHEST EDV: 35
RIGHT CBA DIAS: 8 CM/S
RIGHT CBA SYS: 49 CM/S
RIGHT CCA DIST DIAS: 10 CM/S
RIGHT CCA DIST SYS: 43 CM/S
RIGHT CCA MID DIAS: 10 CM/S
RIGHT CCA MID SYS: 49 CM/S
RIGHT CCA PROX DIAS: 9 CM/S
RIGHT CCA PROX SYS: 58 CM/S
RIGHT ECA DIAS: 10 CM/S
RIGHT ECA SYS: 66 CM/S
RIGHT ICA DIST DIAS: 22 CM/S
RIGHT ICA DIST SYS: 82 CM/S
RIGHT ICA MID DIAS: 17 CM/S
RIGHT ICA MID SYS: 54 CM/S
RIGHT ICA PROX DIAS: 14 CM/S
RIGHT ICA PROX SYS: 40 CM/S
RIGHT VERTEBRAL DIAS: 8 CM/S
RIGHT VERTEBRAL SYS: 51 CM/S

## 2022-12-29 PROCEDURE — 93880 EXTRACRANIAL BILAT STUDY: CPT | Mod: PO

## 2022-12-29 PROCEDURE — 93880 EXTRACRANIAL BILAT STUDY: CPT | Mod: 26,,, | Performed by: INTERNAL MEDICINE

## 2022-12-29 PROCEDURE — 93880 CV US DOPPLER CAROTID (CUPID ONLY): ICD-10-PCS | Mod: 26,,, | Performed by: INTERNAL MEDICINE

## 2023-01-18 ENCOUNTER — HOSPITAL ENCOUNTER (OUTPATIENT)
Dept: RADIOLOGY | Facility: HOSPITAL | Age: 78
Discharge: HOME | End: 2023-01-18
Attending: INTERNAL MEDICINE
Payer: MEDICARE

## 2023-01-18 ENCOUNTER — CLINICAL SUPPORT (OUTPATIENT)
Dept: CARDIOLOGY | Facility: HOSPITAL | Age: 78
End: 2023-01-18
Attending: INTERNAL MEDICINE
Payer: MEDICARE

## 2023-01-18 VITALS — BODY MASS INDEX: 23.74 KG/M2 | HEIGHT: 74 IN | WEIGHT: 185 LBS

## 2023-01-18 DIAGNOSIS — I70.8 AORTO-ILIAC ATHEROSCLEROSIS: ICD-10-CM

## 2023-01-18 DIAGNOSIS — I70.0 AORTO-ILIAC ATHEROSCLEROSIS: ICD-10-CM

## 2023-01-18 DIAGNOSIS — Z86.002 HISTORY OF CARCINOMA IN SITU OF PROSTATE: ICD-10-CM

## 2023-01-18 DIAGNOSIS — F17.200 TOBACCO USE DISORDER: ICD-10-CM

## 2023-01-18 DIAGNOSIS — C61 PROSTATE CANCER METASTATIC TO BONE: ICD-10-CM

## 2023-01-18 DIAGNOSIS — I35.0 AORTIC VALVE STENOSIS, ETIOLOGY OF CARDIAC VALVE DISEASE UNSPECIFIED: ICD-10-CM

## 2023-01-18 DIAGNOSIS — I10 ESSENTIAL HYPERTENSION: ICD-10-CM

## 2023-01-18 DIAGNOSIS — I35.8 AORTIC VALVE SCLEROSIS: ICD-10-CM

## 2023-01-18 DIAGNOSIS — R01.1 HEART MURMUR: ICD-10-CM

## 2023-01-18 DIAGNOSIS — J44.9 CHRONIC OBSTRUCTIVE PULMONARY DISEASE, UNSPECIFIED COPD TYPE: ICD-10-CM

## 2023-01-18 DIAGNOSIS — E78.5 HYPERLIPIDEMIA, UNSPECIFIED HYPERLIPIDEMIA TYPE: ICD-10-CM

## 2023-01-18 DIAGNOSIS — I27.20 PULMONARY HYPERTENSION: ICD-10-CM

## 2023-01-18 DIAGNOSIS — Z85.820 HISTORY OF MALIGNANT MELANOMA: ICD-10-CM

## 2023-01-18 DIAGNOSIS — C79.51 PROSTATE CANCER METASTATIC TO BONE: ICD-10-CM

## 2023-01-18 LAB
CV PHARM DOSE: 0.4 MG
CV STRESS BASE HR: 61 BPM
DIASTOLIC BLOOD PRESSURE: 86 MMHG
NUC STRESS EJECTION FRACTION: 68 %
OHS CV CPX 1 MINUTE RECOVERY HEART RATE: 100 BPM
OHS CV CPX 85 PERCENT MAX PREDICTED HEART RATE MALE: 120
OHS CV CPX MAX PREDICTED HEART RATE: 141
OHS CV CPX PATIENT IS FEMALE: 0
OHS CV CPX PATIENT IS MALE: 1
OHS CV CPX PEAK DIASTOLIC BLOOD PRESSURE: 86 MMHG
OHS CV CPX PEAK HEAR RATE: 102 BPM
OHS CV CPX PEAK RATE PRESSURE PRODUCT: NORMAL
OHS CV CPX PEAK SYSTOLIC BLOOD PRESSURE: 167 MMHG
OHS CV CPX PERCENT MAX PREDICTED HEART RATE ACHIEVED: 72
OHS CV CPX RATE PRESSURE PRODUCT PRESENTING: NORMAL
OHS CV PHARM TIME: 848 MIN
SYSTOLIC BLOOD PRESSURE: 167 MMHG

## 2023-01-18 PROCEDURE — 63600175 PHARM REV CODE 636 W HCPCS: Mod: PO | Performed by: INTERNAL MEDICINE

## 2023-01-18 PROCEDURE — 93016 NUCLEAR STRESS - CARDIOLOGY INTERPRETED (CUPID ONLY): ICD-10-PCS | Mod: ,,, | Performed by: INTERNAL MEDICINE

## 2023-01-18 PROCEDURE — A9502 TC99M TETROFOSMIN: HCPCS | Mod: PO

## 2023-01-18 PROCEDURE — 93018 PR CARDIAC STRESS TST,INTERP/REPT ONLY: ICD-10-PCS | Mod: ,,, | Performed by: INTERNAL MEDICINE

## 2023-01-18 PROCEDURE — 93016 CV STRESS TEST SUPVJ ONLY: CPT | Mod: ,,, | Performed by: INTERNAL MEDICINE

## 2023-01-18 PROCEDURE — 78452 HT MUSCLE IMAGE SPECT MULT: CPT | Mod: 26,,, | Performed by: INTERNAL MEDICINE

## 2023-01-18 PROCEDURE — 93017 CV STRESS TEST TRACING ONLY: CPT | Mod: PO

## 2023-01-18 PROCEDURE — 93018 CV STRESS TEST I&R ONLY: CPT | Mod: ,,, | Performed by: INTERNAL MEDICINE

## 2023-01-18 PROCEDURE — 78452 NUCLEAR STRESS - CARDIOLOGY INTERPRETED (CUPID ONLY): ICD-10-PCS | Mod: 26,,, | Performed by: INTERNAL MEDICINE

## 2023-01-18 PROCEDURE — 78452 HT MUSCLE IMAGE SPECT MULT: CPT | Mod: PO

## 2023-01-18 RX ORDER — REGADENOSON 0.08 MG/ML
0.4 INJECTION, SOLUTION INTRAVENOUS
Status: COMPLETED | OUTPATIENT
Start: 2023-01-18 | End: 2023-01-18

## 2023-01-18 RX ADMIN — REGADENOSON 0.4 MG: 0.08 INJECTION, SOLUTION INTRAVENOUS at 08:01

## 2023-02-02 ENCOUNTER — OFFICE VISIT (OUTPATIENT)
Dept: CARDIOLOGY | Facility: CLINIC | Age: 78
End: 2023-02-02
Payer: MEDICARE

## 2023-02-02 VITALS
HEIGHT: 74 IN | WEIGHT: 184.94 LBS | HEART RATE: 67 BPM | DIASTOLIC BLOOD PRESSURE: 70 MMHG | SYSTOLIC BLOOD PRESSURE: 154 MMHG | BODY MASS INDEX: 23.74 KG/M2

## 2023-02-02 DIAGNOSIS — Z85.820 HISTORY OF MALIGNANT MELANOMA: ICD-10-CM

## 2023-02-02 DIAGNOSIS — E78.5 HYPERLIPIDEMIA, UNSPECIFIED HYPERLIPIDEMIA TYPE: Primary | ICD-10-CM

## 2023-02-02 DIAGNOSIS — I35.0 AORTIC VALVE STENOSIS, ETIOLOGY OF CARDIAC VALVE DISEASE UNSPECIFIED: ICD-10-CM

## 2023-02-02 DIAGNOSIS — I70.0 AORTO-ILIAC ATHEROSCLEROSIS: ICD-10-CM

## 2023-02-02 DIAGNOSIS — Z86.002 HISTORY OF CARCINOMA IN SITU OF PROSTATE: ICD-10-CM

## 2023-02-02 DIAGNOSIS — R01.1 HEART MURMUR: ICD-10-CM

## 2023-02-02 DIAGNOSIS — I70.8 AORTO-ILIAC ATHEROSCLEROSIS: ICD-10-CM

## 2023-02-02 DIAGNOSIS — I35.8 AORTIC VALVE SCLEROSIS: ICD-10-CM

## 2023-02-02 DIAGNOSIS — I10 ESSENTIAL HYPERTENSION: ICD-10-CM

## 2023-02-02 PROCEDURE — 99214 PR OFFICE/OUTPT VISIT, EST, LEVL IV, 30-39 MIN: ICD-10-PCS | Mod: S$GLB,,, | Performed by: INTERNAL MEDICINE

## 2023-02-02 PROCEDURE — 3288F PR FALLS RISK ASSESSMENT DOCUMENTED: ICD-10-PCS | Mod: CPTII,S$GLB,, | Performed by: INTERNAL MEDICINE

## 2023-02-02 PROCEDURE — 1126F PR PAIN SEVERITY QUANTIFIED, NO PAIN PRESENT: ICD-10-PCS | Mod: CPTII,S$GLB,, | Performed by: INTERNAL MEDICINE

## 2023-02-02 PROCEDURE — 99214 OFFICE O/P EST MOD 30 MIN: CPT | Mod: S$GLB,,, | Performed by: INTERNAL MEDICINE

## 2023-02-02 PROCEDURE — 3078F DIAST BP <80 MM HG: CPT | Mod: CPTII,S$GLB,, | Performed by: INTERNAL MEDICINE

## 2023-02-02 PROCEDURE — 3288F FALL RISK ASSESSMENT DOCD: CPT | Mod: CPTII,S$GLB,, | Performed by: INTERNAL MEDICINE

## 2023-02-02 PROCEDURE — 1159F PR MEDICATION LIST DOCUMENTED IN MEDICAL RECORD: ICD-10-PCS | Mod: CPTII,S$GLB,, | Performed by: INTERNAL MEDICINE

## 2023-02-02 PROCEDURE — 1101F PT FALLS ASSESS-DOCD LE1/YR: CPT | Mod: CPTII,S$GLB,, | Performed by: INTERNAL MEDICINE

## 2023-02-02 PROCEDURE — 99999 PR PBB SHADOW E&M-EST. PATIENT-LVL III: CPT | Mod: PBBFAC,,, | Performed by: INTERNAL MEDICINE

## 2023-02-02 PROCEDURE — 3077F SYST BP >= 140 MM HG: CPT | Mod: CPTII,S$GLB,, | Performed by: INTERNAL MEDICINE

## 2023-02-02 PROCEDURE — 1126F AMNT PAIN NOTED NONE PRSNT: CPT | Mod: CPTII,S$GLB,, | Performed by: INTERNAL MEDICINE

## 2023-02-02 PROCEDURE — 99999 PR PBB SHADOW E&M-EST. PATIENT-LVL III: ICD-10-PCS | Mod: PBBFAC,,, | Performed by: INTERNAL MEDICINE

## 2023-02-02 PROCEDURE — 3077F PR MOST RECENT SYSTOLIC BLOOD PRESSURE >= 140 MM HG: ICD-10-PCS | Mod: CPTII,S$GLB,, | Performed by: INTERNAL MEDICINE

## 2023-02-02 PROCEDURE — 3078F PR MOST RECENT DIASTOLIC BLOOD PRESSURE < 80 MM HG: ICD-10-PCS | Mod: CPTII,S$GLB,, | Performed by: INTERNAL MEDICINE

## 2023-02-02 PROCEDURE — 1101F PR PT FALLS ASSESS DOC 0-1 FALLS W/OUT INJ PAST YR: ICD-10-PCS | Mod: CPTII,S$GLB,, | Performed by: INTERNAL MEDICINE

## 2023-02-02 PROCEDURE — 1159F MED LIST DOCD IN RCRD: CPT | Mod: CPTII,S$GLB,, | Performed by: INTERNAL MEDICINE

## 2023-02-02 NOTE — PROGRESS NOTES
Subjective:    Patient ID:  Dutch Olivier is a 77 y.o. male patient here for evaluation Follow-up      History of Present :  Cardiology follow-up.  Aortic stenosis, moderate by echo 02/2022.  Follow-up nuclear study 1121 to negative for ischemia.  Carotid ultrasound negative for high-grade carotid disease.  Risk factors include hypertension dyslipidemia.  Tobacco use, history of COPD.  History prostate cancer following with Urology.    No angina, HATCH, syncope/presyncope.  No arrhythmia or palpitations.             Review of patient's allergies indicates:   Allergen Reactions    Aspirin Hives       Past Medical History:   Diagnosis Date    Amblyopia     OS    Cancer     melanoma, prostate    Cataract     Complication of anesthesia     says he sometimes is slow to awaken    COPD (chronic obstructive pulmonary disease)     no oxygen, no inhalers, or nebulizers    Diverticulosis     History of colonic polyps     History of malignant melanoma 01/01/2011    right ear, had chemo//Dr Gongora    HTN (hypertension)     Hyperlipemia      Past Surgical History:   Procedure Laterality Date    EXTERNAL EAR SURGERY  2011    melanoma removed right ear    HERNIA REPAIR      [facility]    PORTACATH PLACEMENT  2011    later removed    PROSTATE SURGERY  2003    prostatectomy     Social History     Tobacco Use    Smoking status: Every Day     Packs/day: 1.00     Types: Cigarettes    Smokeless tobacco: Never   Substance Use Topics    Alcohol use: Yes     Comment: 3-4 glasses nightly wine    Drug use: No        Review of Systems:    As noted in HPI in addition      REVIEW OF SYSTEMS  Review of Systems   Constitutional: Negative for decreased appetite, diaphoresis, night sweats, weight gain and weight loss.   HENT:  Negative for nosebleeds and odynophagia.    Eyes:  Negative for double vision and photophobia.   Cardiovascular:  Negative for chest pain, claudication, cyanosis, dyspnea on exertion, irregular heartbeat, leg swelling,  near-syncope, orthopnea, palpitations, paroxysmal nocturnal dyspnea and syncope.   Respiratory:  Negative for cough, hemoptysis, shortness of breath and wheezing.    Hematologic/Lymphatic: Negative for adenopathy.   Skin:  Negative for flushing, skin cancer and suspicious lesions.   Musculoskeletal:  Negative for gout, myalgias and neck pain.   Gastrointestinal:  Negative for abdominal pain, heartburn, hematemesis and hematochezia.   Genitourinary:  Negative for bladder incontinence, hesitancy and nocturia.   Neurological:  Negative for focal weakness, headaches, light-headedness and paresthesias.   Psychiatric/Behavioral:  Negative for memory loss and substance abuse.             Objective:        Vitals:    02/02/23 1107   BP: (!) 154/70   Pulse: 67       Lab Results   Component Value Date    WBC 7.96 08/09/2022    HGB 14.5 08/09/2022    HCT 44.1 08/09/2022     08/09/2022    CHOL 211 (H) 08/09/2022    TRIG 211 (H) 08/09/2022    HDL 63 08/09/2022    ALT 12 08/09/2022    AST 15 08/09/2022     08/09/2022    K 4.7 08/09/2022     08/09/2022    CREATININE 0.8 08/09/2022    BUN 11 08/09/2022    CO2 25 08/09/2022    TSH 1.005 08/09/2022    PSA <0.010 09/12/2012    HGBA1C 4.9 08/09/2022        ECHOCARDIOGRAM RESULTS  Results for orders placed in visit on 10/11/22    Echo    Interpretation Summary  · The left ventricle is normal in size with eccentric hypertrophy and normal systolic function.  · The estimated ejection fraction is 55%.  · Normal left ventricular diastolic function.  · Normal right ventricular size with normal right ventricular systolic function.  · There is mild-to-moderate aortic valve stenosis.  · Aortic valve area is 1.44 cm2; peak velocity is 3.17 m/s; mean gradient is 24 mmHg.  · Mild mitral regurgitation.  · Mild tricuspid regurgitation.  · Normal central venous pressure (3 mmHg).  · The estimated PA systolic pressure is 33 mmHg.        CURRENT/PREVIOUS VISIT EKG  No results found  for this or any previous visit.  No valid procedures specified.   Results for orders placed during the hospital encounter of 01/18/23    Nuclear Stress - Cardiology Interpreted    Interpretation Summary    Normal myocardial perfusion scan. There is no evidence of myocardial ischemia or infarction.    There is a  mild intensity fixed perfusion abnormality in the  wall of the left ventricle secondary to diaphragm attenuation.    There is mild to moderate apical thinning which is a normal variant.    The gated perfusion images showed an ejection fraction of 68% post stress.    There is normal wall motion post stress.    LV cavity size is normal at rest and normal at stress.    The ECG portion of the study is negative for ischemia.    The patient reported no chest pain during the stress test.    Low risk study for ischemia.    No valid procedures specified.    PHYSICAL EXAM  CONSTITUTIONAL: Well built, well nourished in no apparent distress  NECK: no carotid bruit, no JVD  LUNGS: CTA  CHEST WALL: no tenderness,  HEART: regular rate and rhythm, S1, S2 mildly distant.  Grade 2/6 crescendo decrescendo murmur aortic area.  ABDOMEN: soft, non-tender; bowel sounds normal; no masses,  no organomegaly  EXTREMITIES: Extremities normal, no edema, no calf tenderness noted  NEURO: AAO X 3    I HAVE REVIEWED :    The vital signs, nurses notes, and all the pertinent radiology and labs.         Current Outpatient Medications   Medication Instructions    apalutamide (ERLEADA) 240 mg, Oral, Daily    b complex vitamins tablet 1 tablet, Oral, Daily,      benazepriL (LOTENSIN) 20 mg, Oral, 2 times daily    multivitamin capsule 1 capsule, Oral, Daily,      pravastatin (PRAVACHOL) 40 mg, Oral, Daily          Assessment:   Moderate aortic stenosis with negative nuclear assessment for ischemic heart disease.  Carotid ultrasound negative for high-grade internal carotid artery disease.  Hypertension, dyslipidemia, positive tobacco  use.  COPD  Prostate cancer.        Plan:     Risk factor modification.  Six month follow-up.  Continue pravastatin 40 daily.  Benazepril 20 b.i.d..        No follow-ups on file.

## 2023-03-10 ENCOUNTER — LAB VISIT (OUTPATIENT)
Dept: LAB | Facility: HOSPITAL | Age: 78
End: 2023-03-10
Attending: UROLOGY
Payer: MEDICARE

## 2023-03-10 ENCOUNTER — OFFICE VISIT (OUTPATIENT)
Dept: UROLOGY | Facility: CLINIC | Age: 78
End: 2023-03-10
Payer: MEDICARE

## 2023-03-10 VITALS — HEIGHT: 74 IN | WEIGHT: 186.31 LBS | BODY MASS INDEX: 23.91 KG/M2

## 2023-03-10 DIAGNOSIS — C61 PROSTATE CANCER: Primary | ICD-10-CM

## 2023-03-10 DIAGNOSIS — C61 PROSTATE CANCER: ICD-10-CM

## 2023-03-10 LAB — COMPLEXED PSA SERPL-MCNC: <0.01 NG/ML (ref 0–4)

## 2023-03-10 PROCEDURE — 84153 ASSAY OF PSA TOTAL: CPT | Performed by: UROLOGY

## 2023-03-10 PROCEDURE — 99214 OFFICE O/P EST MOD 30 MIN: CPT | Mod: S$GLB,,, | Performed by: UROLOGY

## 2023-03-10 PROCEDURE — 99999 PR PBB SHADOW E&M-EST. PATIENT-LVL II: CPT | Mod: PBBFAC,,, | Performed by: UROLOGY

## 2023-03-10 PROCEDURE — 1126F AMNT PAIN NOTED NONE PRSNT: CPT | Mod: CPTII,S$GLB,, | Performed by: UROLOGY

## 2023-03-10 PROCEDURE — 3288F FALL RISK ASSESSMENT DOCD: CPT | Mod: CPTII,S$GLB,, | Performed by: UROLOGY

## 2023-03-10 PROCEDURE — 36415 COLL VENOUS BLD VENIPUNCTURE: CPT | Mod: PO | Performed by: UROLOGY

## 2023-03-10 PROCEDURE — 1101F PR PT FALLS ASSESS DOC 0-1 FALLS W/OUT INJ PAST YR: ICD-10-PCS | Mod: CPTII,S$GLB,, | Performed by: UROLOGY

## 2023-03-10 PROCEDURE — 99999 PR PBB SHADOW E&M-EST. PATIENT-LVL II: ICD-10-PCS | Mod: PBBFAC,,, | Performed by: UROLOGY

## 2023-03-10 PROCEDURE — 1101F PT FALLS ASSESS-DOCD LE1/YR: CPT | Mod: CPTII,S$GLB,, | Performed by: UROLOGY

## 2023-03-10 PROCEDURE — 1126F PR PAIN SEVERITY QUANTIFIED, NO PAIN PRESENT: ICD-10-PCS | Mod: CPTII,S$GLB,, | Performed by: UROLOGY

## 2023-03-10 PROCEDURE — 3288F PR FALLS RISK ASSESSMENT DOCUMENTED: ICD-10-PCS | Mod: CPTII,S$GLB,, | Performed by: UROLOGY

## 2023-03-10 PROCEDURE — 1159F MED LIST DOCD IN RCRD: CPT | Mod: CPTII,S$GLB,, | Performed by: UROLOGY

## 2023-03-10 PROCEDURE — 1159F PR MEDICATION LIST DOCUMENTED IN MEDICAL RECORD: ICD-10-PCS | Mod: CPTII,S$GLB,, | Performed by: UROLOGY

## 2023-03-10 PROCEDURE — 99214 PR OFFICE/OUTPT VISIT, EST, LEVL IV, 30-39 MIN: ICD-10-PCS | Mod: S$GLB,,, | Performed by: UROLOGY

## 2023-03-10 RX ORDER — BICALUTAMIDE 50 MG/1
50 TABLET, FILM COATED ORAL DAILY
Qty: 30 TABLET | Refills: 11 | Status: SHIPPED | OUTPATIENT
Start: 2023-03-10 | End: 2024-03-04

## 2023-03-10 NOTE — PROGRESS NOTES
Subjective:       Patient ID: Dutch Olivier is a 77 y.o. male.    Chief Complaint: Follow-up (6 month )    HPI    77-year-old with a distant history of prostate cancer.  He underwent radical prostatectomy in 2000 by Dr. Landers.  His PSA had been undetectable however in 2015 we begin to see a slight increase in his PSA.  His PSA increased to 21.5.  Bone scan was obtained which did show 2 suspicious lesions.  He began androgen ablation.  He was given his 1st Eligard injection in February 2021. He also began apalutamide in May 2021.  His most recent PSA is undetectable.  He discontinued apalutamide in mid January 2023 as it was too expensive.  He is otherwise doing well.  He has no bothersome urinary symptoms.  He denies hematuria and dysuria.   He has no bony pain.     Component PSA Diagnostic   Latest Ref Rng & Units 0.00 - 4.00 ng/mL   9/7/2022 <0.01   3/10/2022 <0.01   8/25/2021 <0.01   6/2/2021 0.06   2/5/2021 23.5 (H)   2/2/2021 21.5 (H)   7/7/2020 1.1   5/9/2019 0.07   1/16/2018 0.04   1/12/2016 0.02     Review of Systems   Constitutional:  Negative for fever.   Genitourinary:  Negative for dysuria and hematuria.     Objective:      Physical Exam  Vitals reviewed.   Constitutional:       Appearance: He is well-developed.   Pulmonary:      Effort: Pulmonary effort is normal.   Skin:     Findings: No rash.   Neurological:      Mental Status: He is alert and oriented to person, place, and time.       Assessment:       1. Prostate cancer        Plan:       Prostate cancer  -     Prostate Specific Antigen, Diagnostic; Future; Expected date: 03/10/2023    Other orders  -     bicalutamide (CASODEX) 50 MG Tab; Take 1 tablet (50 mg total) by mouth once daily.  Dispense: 30 tablet; Refill: 11  -     leuprolide acetate (6 month) injection 45 mg      Ideally would like to continue apalutamide but since this is not possible I will add bicalutamide.  He needs to continue Lupron injection which will be given at the  [facility].    Follow-up 6 months with PSA

## 2023-03-17 ENCOUNTER — TELEPHONE (OUTPATIENT)
Dept: UROLOGY | Facility: CLINIC | Age: 78
End: 2023-03-17
Payer: MEDICARE

## 2023-03-17 NOTE — TELEPHONE ENCOUNTER
----- Message from Kasey Joseph sent at 3/17/2023  9:47 AM CDT -----  Caller is requesting an injection appointment. Please place Orders and  contact patient to schedule.      Name of Caller: YEHUDA MONTANA [[id number]]       Preferred appointment date and time?  any time        Appointment Location?  [facility]       Type of Injection:  treatment prostate cancer       Would the patient rather a call back or a response via [facility]?   call back      Best Call Back Number:  [phone]      Additional Information:

## 2023-03-20 ENCOUNTER — INFUSION (OUTPATIENT)
Dept: INFUSION THERAPY | Facility: HOSPITAL | Age: 78
End: 2023-03-20
Payer: MEDICARE

## 2023-03-20 VITALS
HEART RATE: 72 BPM | SYSTOLIC BLOOD PRESSURE: 136 MMHG | RESPIRATION RATE: 18 BRPM | DIASTOLIC BLOOD PRESSURE: 76 MMHG | WEIGHT: 184.94 LBS | TEMPERATURE: 98 F | BODY MASS INDEX: 23.75 KG/M2

## 2023-03-20 DIAGNOSIS — C61 PROSTATE CANCER: Primary | ICD-10-CM

## 2023-03-20 PROCEDURE — 96402 CHEMO HORMON ANTINEOPL SQ/IM: CPT | Mod: PN

## 2023-03-20 PROCEDURE — 63600175 PHARM REV CODE 636 W HCPCS: Mod: JZ,JG,PN | Performed by: UROLOGY

## 2023-03-20 RX ADMIN — LEUPROLIDE ACETATE 45 MG: KIT at 12:03

## 2023-05-11 ENCOUNTER — PES CALL (OUTPATIENT)
Dept: ADMINISTRATIVE | Facility: CLINIC | Age: 78
End: 2023-05-11
Payer: MEDICARE

## 2023-06-09 ENCOUNTER — PES CALL (OUTPATIENT)
Dept: ADMINISTRATIVE | Facility: CLINIC | Age: 78
End: 2023-06-09
Payer: MEDICARE

## 2023-06-15 ENCOUNTER — PES CALL (OUTPATIENT)
Dept: ADMINISTRATIVE | Facility: CLINIC | Age: 78
End: 2023-06-15
Payer: MEDICARE

## 2023-07-07 ENCOUNTER — TELEPHONE (OUTPATIENT)
Dept: FAMILY MEDICINE | Facility: CLINIC | Age: 78
End: 2023-07-07
Payer: MEDICARE

## 2023-07-07 DIAGNOSIS — Z00.00 ROUTINE GENERAL MEDICAL EXAMINATION AT A HEALTH CARE FACILITY: ICD-10-CM

## 2023-07-07 DIAGNOSIS — C79.51 PROSTATE CANCER METASTATIC TO BONE: ICD-10-CM

## 2023-07-07 DIAGNOSIS — E78.5 HYPERLIPIDEMIA, UNSPECIFIED HYPERLIPIDEMIA TYPE: ICD-10-CM

## 2023-07-07 DIAGNOSIS — Z01.89 ENCOUNTER FOR LABORATORY EXAMINATION: ICD-10-CM

## 2023-07-07 DIAGNOSIS — I10 ESSENTIAL HYPERTENSION: Primary | ICD-10-CM

## 2023-07-07 DIAGNOSIS — C61 PROSTATE CANCER METASTATIC TO BONE: ICD-10-CM

## 2023-07-07 DIAGNOSIS — Z13.29 SCREENING FOR THYROID DISORDER: ICD-10-CM

## 2023-07-07 DIAGNOSIS — R53.83 FATIGUE, UNSPECIFIED TYPE: ICD-10-CM

## 2023-07-07 NOTE — TELEPHONE ENCOUNTER
----- Message from Darlin Smith, Patient Care Assistant sent at 7/7/2023 11:17 AM CDT -----  Contact: self  Pt is  calling to  have orders for annual  labs. Please call pt when are orders are in  [phone]  thanks

## 2023-07-07 NOTE — TELEPHONE ENCOUNTER
Labs order per Dr. Arreola request.       Pt notified and verbalized understanding. Lab apt scheduled 09/13 @ 10: 10

## 2023-09-12 ENCOUNTER — OFFICE VISIT (OUTPATIENT)
Dept: CARDIOLOGY | Facility: CLINIC | Age: 78
End: 2023-09-12
Payer: MEDICARE

## 2023-09-12 VITALS
DIASTOLIC BLOOD PRESSURE: 73 MMHG | BODY MASS INDEX: 24.47 KG/M2 | WEIGHT: 190.69 LBS | SYSTOLIC BLOOD PRESSURE: 183 MMHG | HEART RATE: 62 BPM | HEIGHT: 74 IN

## 2023-09-12 DIAGNOSIS — F17.200 TOBACCO USE DISORDER: ICD-10-CM

## 2023-09-12 DIAGNOSIS — I10 ESSENTIAL HYPERTENSION: ICD-10-CM

## 2023-09-12 DIAGNOSIS — Z85.820 HISTORY OF MALIGNANT MELANOMA: ICD-10-CM

## 2023-09-12 DIAGNOSIS — I70.8 AORTO-ILIAC ATHEROSCLEROSIS: ICD-10-CM

## 2023-09-12 DIAGNOSIS — I70.0 AORTO-ILIAC ATHEROSCLEROSIS: ICD-10-CM

## 2023-09-12 DIAGNOSIS — E78.2 MIXED HYPERLIPIDEMIA: Primary | ICD-10-CM

## 2023-09-12 DIAGNOSIS — Z86.002 HISTORY OF CARCINOMA IN SITU OF PROSTATE: ICD-10-CM

## 2023-09-12 PROCEDURE — 3077F SYST BP >= 140 MM HG: CPT | Mod: CPTII,S$GLB,, | Performed by: INTERNAL MEDICINE

## 2023-09-12 PROCEDURE — 3078F PR MOST RECENT DIASTOLIC BLOOD PRESSURE < 80 MM HG: ICD-10-PCS | Mod: CPTII,S$GLB,, | Performed by: INTERNAL MEDICINE

## 2023-09-12 PROCEDURE — 1101F PT FALLS ASSESS-DOCD LE1/YR: CPT | Mod: CPTII,S$GLB,, | Performed by: INTERNAL MEDICINE

## 2023-09-12 PROCEDURE — 1159F MED LIST DOCD IN RCRD: CPT | Mod: CPTII,S$GLB,, | Performed by: INTERNAL MEDICINE

## 2023-09-12 PROCEDURE — 3288F FALL RISK ASSESSMENT DOCD: CPT | Mod: CPTII,S$GLB,, | Performed by: INTERNAL MEDICINE

## 2023-09-12 PROCEDURE — 99999 PR PBB SHADOW E&M-EST. PATIENT-LVL III: CPT | Mod: PBBFAC,,, | Performed by: INTERNAL MEDICINE

## 2023-09-12 PROCEDURE — 1101F PR PT FALLS ASSESS DOC 0-1 FALLS W/OUT INJ PAST YR: ICD-10-PCS | Mod: CPTII,S$GLB,, | Performed by: INTERNAL MEDICINE

## 2023-09-12 PROCEDURE — 3288F PR FALLS RISK ASSESSMENT DOCUMENTED: ICD-10-PCS | Mod: CPTII,S$GLB,, | Performed by: INTERNAL MEDICINE

## 2023-09-12 PROCEDURE — 1126F AMNT PAIN NOTED NONE PRSNT: CPT | Mod: CPTII,S$GLB,, | Performed by: INTERNAL MEDICINE

## 2023-09-12 PROCEDURE — 99214 PR OFFICE/OUTPT VISIT, EST, LEVL IV, 30-39 MIN: ICD-10-PCS | Mod: S$GLB,,, | Performed by: INTERNAL MEDICINE

## 2023-09-12 PROCEDURE — 1159F PR MEDICATION LIST DOCUMENTED IN MEDICAL RECORD: ICD-10-PCS | Mod: CPTII,S$GLB,, | Performed by: INTERNAL MEDICINE

## 2023-09-12 PROCEDURE — 3077F PR MOST RECENT SYSTOLIC BLOOD PRESSURE >= 140 MM HG: ICD-10-PCS | Mod: CPTII,S$GLB,, | Performed by: INTERNAL MEDICINE

## 2023-09-12 PROCEDURE — 3078F DIAST BP <80 MM HG: CPT | Mod: CPTII,S$GLB,, | Performed by: INTERNAL MEDICINE

## 2023-09-12 PROCEDURE — 99214 OFFICE O/P EST MOD 30 MIN: CPT | Mod: S$GLB,,, | Performed by: INTERNAL MEDICINE

## 2023-09-12 PROCEDURE — 1126F PR PAIN SEVERITY QUANTIFIED, NO PAIN PRESENT: ICD-10-PCS | Mod: CPTII,S$GLB,, | Performed by: INTERNAL MEDICINE

## 2023-09-12 PROCEDURE — 99999 PR PBB SHADOW E&M-EST. PATIENT-LVL III: ICD-10-PCS | Mod: PBBFAC,,, | Performed by: INTERNAL MEDICINE

## 2023-09-12 NOTE — PROGRESS NOTES
Subjective:    Patient ID:  Dutch Olivier is a 78 y.o. male patient here for evaluation Follow-up      History of Present Illness:  Cardiology follow-up.  Valvular heart disease with heart murmur.  Moderate aortic stenosis.  Nuclear study in the past negative for ischemic heart disease.  Carotid ultrasound negative for high-grade carotid disease.  Risk factors include hypertension, dyslipidemia, positive tobacco use.  History of COPD.    Ongoing treatment for prostate CA.             Review of patient's allergies indicates:   Allergen Reactions    Aspirin Hives       Past Medical History:   Diagnosis Date    Amblyopia     OS    Cancer     melanoma, prostate    Cataract     Complication of anesthesia     says he sometimes is slow to awaken    COPD (chronic obstructive pulmonary disease)     no oxygen, no inhalers, or nebulizers    Diverticulosis     History of colonic polyps     History of malignant melanoma 01/01/2011    right ear, had chemo//Dr Gongora    HTN (hypertension)     Hyperlipemia      Past Surgical History:   Procedure Laterality Date    EXTERNAL EAR SURGERY  2011    melanoma removed right ear    HERNIA REPAIR      [facility]    PORTACATH PLACEMENT  2011    later removed    PROSTATE SURGERY  2003    prostatectomy     Social History     Tobacco Use    Smoking status: Every Day     Current packs/day: 1.00     Types: Cigarettes    Smokeless tobacco: Never   Substance Use Topics    Alcohol use: Yes     Comment: 3-4 glasses nightly wine    Drug use: No        Review of Systems:    As noted in HPI in addition      REVIEW OF SYSTEMS  Review of Systems   Constitutional: Negative for decreased appetite, diaphoresis, night sweats, weight gain and weight loss.   HENT:  Negative for nosebleeds and odynophagia.    Eyes:  Negative for double vision and photophobia.   Cardiovascular:  Negative for chest pain, claudication, cyanosis, dyspnea on exertion, irregular heartbeat, leg swelling, near-syncope, orthopnea,  palpitations, paroxysmal nocturnal dyspnea and syncope.   Respiratory:  Negative for cough, hemoptysis, shortness of breath and wheezing.    Hematologic/Lymphatic: Negative for adenopathy.   Skin:  Negative for flushing, skin cancer and suspicious lesions.   Musculoskeletal:  Negative for gout, myalgias and neck pain.   Gastrointestinal:  Negative for abdominal pain, heartburn, hematemesis and hematochezia.   Genitourinary:  Negative for bladder incontinence, hesitancy and nocturia.   Neurological:  Negative for focal weakness, headaches, light-headedness and paresthesias.   Psychiatric/Behavioral:  Negative for memory loss and substance abuse.               Objective:        Vitals:    09/12/23 1044   BP: (!) 183/73   Pulse: 62       Lab Results   Component Value Date    WBC 7.96 08/09/2022    HGB 14.5 08/09/2022    HCT 44.1 08/09/2022     08/09/2022    CHOL 211 (H) 08/09/2022    TRIG 211 (H) 08/09/2022    HDL 63 08/09/2022    ALT 12 08/09/2022    AST 15 08/09/2022     08/09/2022    K 4.7 08/09/2022     08/09/2022    CREATININE 0.8 08/09/2022    BUN 11 08/09/2022    CO2 25 08/09/2022    TSH 1.005 08/09/2022    PSA <0.010 09/12/2012    HGBA1C 4.9 08/09/2022        ECHOCARDIOGRAM RESULTS  Results for orders placed in visit on 10/11/22    Echo    Interpretation Summary  · The left ventricle is normal in size with eccentric hypertrophy and normal systolic function.  · The estimated ejection fraction is 55%.  · Normal left ventricular diastolic function.  · Normal right ventricular size with normal right ventricular systolic function.  · There is mild-to-moderate aortic valve stenosis.  · Aortic valve area is 1.44 cm2; peak velocity is 3.17 m/s; mean gradient is 24 mmHg.  · Mild mitral regurgitation.  · Mild tricuspid regurgitation.  · Normal central venous pressure (3 mmHg).  · The estimated PA systolic pressure is 33 mmHg.        CURRENT/PREVIOUS VISIT EKG  No results found for this or any previous  visit.  No valid procedures specified.   Results for orders placed during the hospital encounter of 01/18/23    Nuclear Stress - Cardiology Interpreted    Interpretation Summary    Normal myocardial perfusion scan. There is no evidence of myocardial ischemia or infarction.    There is a  mild intensity fixed perfusion abnormality in the  wall of the left ventricle secondary to diaphragm attenuation.    There is mild to moderate apical thinning which is a normal variant.    The gated perfusion images showed an ejection fraction of 68% post stress.    There is normal wall motion post stress.    LV cavity size is normal at rest and normal at stress.    The ECG portion of the study is negative for ischemia.    The patient reported no chest pain during the stress test.    Low risk study for ischemia.    No valid procedures specified.    PHYSICAL EXAM  CONSTITUTIONAL: Well built, well nourished in no apparent distress  NECK: no carotid bruit, no JVD  LUNGS: CTA, mildly decreased breath sounds bilaterally.  CHEST WALL: no tenderness,  HEART: regular rate and rhythm, S1, S2 distant, grade 2/6 crescendo decrescendo murmur aortic area.  ABDOMEN: soft, non-tender; bowel sounds normal; no masses,  no organomegaly  EXTREMITIES: Extremities normal, no edema, no calf tenderness noted.  Decreased popliteal pedal pulses bilaterally.  NEURO: AAO X 3    I HAVE REVIEWED :    The vital signs, nurses notes, and all the pertinent radiology and labs.         Current Outpatient Medications   Medication Instructions    b complex vitamins tablet 1 tablet, Oral, Daily,      benazepriL (LOTENSIN) 20 mg, Oral, 2 times daily    bicalutamide (CASODEX) 50 mg, Oral, Daily    multivitamin capsule 1 capsule, Oral, Daily,      pravastatin (PRAVACHOL) 40 mg, Oral, Daily          Assessment:   Hypertension, dyslipidemia.  Ongoing tobacco use.    Mild moderate AS.  Echo preserved.  No ischemic change noted on nuclear study.    COPD    Peripheral arterial  disease with stable claudication.    Ongoing therapy for prostate CA.        Plan:   Continue to try for tobacco cessation.  Meds reviewed and reconciled.  No changes.  Repeat blood pressure by me 148/78.          No follow-ups on file.

## 2023-09-13 ENCOUNTER — LAB VISIT (OUTPATIENT)
Dept: LAB | Facility: HOSPITAL | Age: 78
End: 2023-09-13
Attending: INTERNAL MEDICINE
Payer: MEDICARE

## 2023-09-13 DIAGNOSIS — I10 ESSENTIAL HYPERTENSION: ICD-10-CM

## 2023-09-13 DIAGNOSIS — C79.51 PROSTATE CANCER METASTATIC TO BONE: ICD-10-CM

## 2023-09-13 DIAGNOSIS — C61 PROSTATE CANCER METASTATIC TO BONE: ICD-10-CM

## 2023-09-13 DIAGNOSIS — Z01.89 ENCOUNTER FOR LABORATORY EXAMINATION: ICD-10-CM

## 2023-09-13 DIAGNOSIS — Z00.00 ROUTINE GENERAL MEDICAL EXAMINATION AT A HEALTH CARE FACILITY: ICD-10-CM

## 2023-09-13 DIAGNOSIS — E78.5 HYPERLIPIDEMIA, UNSPECIFIED HYPERLIPIDEMIA TYPE: ICD-10-CM

## 2023-09-13 DIAGNOSIS — R53.83 FATIGUE, UNSPECIFIED TYPE: ICD-10-CM

## 2023-09-13 LAB
ALBUMIN SERPL BCP-MCNC: 3.9 G/DL (ref 3.5–5.2)
ALP SERPL-CCNC: 90 U/L (ref 55–135)
ALT SERPL W/O P-5'-P-CCNC: 15 U/L (ref 10–44)
ANION GAP SERPL CALC-SCNC: 12 MMOL/L (ref 8–16)
AST SERPL-CCNC: 17 U/L (ref 10–40)
BASOPHILS # BLD AUTO: 0.07 K/UL (ref 0–0.2)
BASOPHILS NFR BLD: 0.7 % (ref 0–1.9)
BILIRUB SERPL-MCNC: 0.7 MG/DL (ref 0.1–1)
BUN SERPL-MCNC: 8 MG/DL (ref 8–23)
CALCIUM SERPL-MCNC: 9.6 MG/DL (ref 8.7–10.5)
CHLORIDE SERPL-SCNC: 108 MMOL/L (ref 95–110)
CHOLEST SERPL-MCNC: 185 MG/DL (ref 120–199)
CHOLEST/HDLC SERPL: 3.6 {RATIO} (ref 2–5)
CO2 SERPL-SCNC: 21 MMOL/L (ref 23–29)
CREAT SERPL-MCNC: 0.9 MG/DL (ref 0.5–1.4)
DIFFERENTIAL METHOD: ABNORMAL
EOSINOPHIL # BLD AUTO: 0.4 K/UL (ref 0–0.5)
EOSINOPHIL NFR BLD: 3.8 % (ref 0–8)
ERYTHROCYTE [DISTWIDTH] IN BLOOD BY AUTOMATED COUNT: 12.9 % (ref 11.5–14.5)
EST. GFR  (NO RACE VARIABLE): >60 ML/MIN/1.73 M^2
GLUCOSE SERPL-MCNC: 106 MG/DL (ref 70–110)
HCT VFR BLD AUTO: 42.7 % (ref 40–54)
HDLC SERPL-MCNC: 51 MG/DL (ref 40–75)
HDLC SERPL: 27.6 % (ref 20–50)
HGB BLD-MCNC: 14.6 G/DL (ref 14–18)
IMM GRANULOCYTES # BLD AUTO: 0.01 K/UL (ref 0–0.04)
IMM GRANULOCYTES NFR BLD AUTO: 0.1 % (ref 0–0.5)
LDLC SERPL CALC-MCNC: 94.2 MG/DL (ref 63–159)
LYMPHOCYTES # BLD AUTO: 2.7 K/UL (ref 1–4.8)
LYMPHOCYTES NFR BLD: 28.8 % (ref 18–48)
MCH RBC QN AUTO: 31.3 PG (ref 27–31)
MCHC RBC AUTO-ENTMCNC: 34.2 G/DL (ref 32–36)
MCV RBC AUTO: 92 FL (ref 82–98)
MONOCYTES # BLD AUTO: 0.7 K/UL (ref 0.3–1)
MONOCYTES NFR BLD: 7.4 % (ref 4–15)
NEUTROPHILS # BLD AUTO: 5.6 K/UL (ref 1.8–7.7)
NEUTROPHILS NFR BLD: 59.2 % (ref 38–73)
NONHDLC SERPL-MCNC: 134 MG/DL
NRBC BLD-RTO: 0 /100 WBC
PLATELET # BLD AUTO: 369 K/UL (ref 150–450)
PMV BLD AUTO: 9.9 FL (ref 9.2–12.9)
POTASSIUM SERPL-SCNC: 4.2 MMOL/L (ref 3.5–5.1)
PROT SERPL-MCNC: 7 G/DL (ref 6–8.4)
RBC # BLD AUTO: 4.66 M/UL (ref 4.6–6.2)
SODIUM SERPL-SCNC: 141 MMOL/L (ref 136–145)
TRIGL SERPL-MCNC: 199 MG/DL (ref 30–150)
TSH SERPL DL<=0.005 MIU/L-ACNC: 0.72 UIU/ML (ref 0.4–4)
WBC # BLD AUTO: 9.43 K/UL (ref 3.9–12.7)

## 2023-09-13 PROCEDURE — 84443 ASSAY THYROID STIM HORMONE: CPT | Performed by: INTERNAL MEDICINE

## 2023-09-13 PROCEDURE — 36415 COLL VENOUS BLD VENIPUNCTURE: CPT | Mod: PN | Performed by: INTERNAL MEDICINE

## 2023-09-13 PROCEDURE — 84153 ASSAY OF PSA TOTAL: CPT | Performed by: INTERNAL MEDICINE

## 2023-09-13 PROCEDURE — 80053 COMPREHEN METABOLIC PANEL: CPT | Performed by: INTERNAL MEDICINE

## 2023-09-13 PROCEDURE — 80061 LIPID PANEL: CPT | Performed by: INTERNAL MEDICINE

## 2023-09-13 PROCEDURE — 85025 COMPLETE CBC W/AUTO DIFF WBC: CPT | Performed by: INTERNAL MEDICINE

## 2023-09-14 LAB — COMPLEXED PSA SERPL-MCNC: <0.01 NG/ML (ref 0–4)

## 2023-09-18 ENCOUNTER — OFFICE VISIT (OUTPATIENT)
Dept: UROLOGY | Facility: CLINIC | Age: 78
End: 2023-09-18
Payer: MEDICARE

## 2023-09-18 VITALS — HEIGHT: 74 IN | WEIGHT: 189.81 LBS | BODY MASS INDEX: 24.36 KG/M2

## 2023-09-18 DIAGNOSIS — C61 PROSTATE CANCER: Primary | ICD-10-CM

## 2023-09-18 DIAGNOSIS — R39.15 URINARY URGENCY: ICD-10-CM

## 2023-09-18 LAB
BILIRUBIN, UA POC OHS: NEGATIVE
BLOOD, UA POC OHS: ABNORMAL
CLARITY, UA POC OHS: ABNORMAL
COLOR, UA POC OHS: YELLOW
GLUCOSE, UA POC OHS: NEGATIVE
KETONES, UA POC OHS: NEGATIVE
LEUKOCYTES, UA POC OHS: ABNORMAL
NITRITE, UA POC OHS: NEGATIVE
PH, UA POC OHS: 6.5
PROTEIN, UA POC OHS: 30
SPECIFIC GRAVITY, UA POC OHS: 1.02
UROBILINOGEN, UA POC OHS: 0.2

## 2023-09-18 PROCEDURE — 1159F PR MEDICATION LIST DOCUMENTED IN MEDICAL RECORD: ICD-10-PCS | Mod: CPTII,S$GLB,, | Performed by: UROLOGY

## 2023-09-18 PROCEDURE — 99213 OFFICE O/P EST LOW 20 MIN: CPT | Mod: S$GLB,,, | Performed by: UROLOGY

## 2023-09-18 PROCEDURE — 1126F AMNT PAIN NOTED NONE PRSNT: CPT | Mod: CPTII,S$GLB,, | Performed by: UROLOGY

## 2023-09-18 PROCEDURE — 81003 URINALYSIS AUTO W/O SCOPE: CPT | Mod: QW,S$GLB,, | Performed by: UROLOGY

## 2023-09-18 PROCEDURE — 1159F MED LIST DOCD IN RCRD: CPT | Mod: CPTII,S$GLB,, | Performed by: UROLOGY

## 2023-09-18 PROCEDURE — 81003 POCT URINALYSIS(INSTRUMENT): ICD-10-PCS | Mod: QW,S$GLB,, | Performed by: UROLOGY

## 2023-09-18 PROCEDURE — 99999 PR PBB SHADOW E&M-EST. PATIENT-LVL II: ICD-10-PCS | Mod: PBBFAC,,, | Performed by: UROLOGY

## 2023-09-18 PROCEDURE — 99999 PR PBB SHADOW E&M-EST. PATIENT-LVL II: CPT | Mod: PBBFAC,,, | Performed by: UROLOGY

## 2023-09-18 PROCEDURE — 1101F PT FALLS ASSESS-DOCD LE1/YR: CPT | Mod: CPTII,S$GLB,, | Performed by: UROLOGY

## 2023-09-18 PROCEDURE — 3288F PR FALLS RISK ASSESSMENT DOCUMENTED: ICD-10-PCS | Mod: CPTII,S$GLB,, | Performed by: UROLOGY

## 2023-09-18 PROCEDURE — 87088 URINE BACTERIA CULTURE: CPT | Performed by: UROLOGY

## 2023-09-18 PROCEDURE — 1126F PR PAIN SEVERITY QUANTIFIED, NO PAIN PRESENT: ICD-10-PCS | Mod: CPTII,S$GLB,, | Performed by: UROLOGY

## 2023-09-18 PROCEDURE — 87077 CULTURE AEROBIC IDENTIFY: CPT | Performed by: UROLOGY

## 2023-09-18 PROCEDURE — 3288F FALL RISK ASSESSMENT DOCD: CPT | Mod: CPTII,S$GLB,, | Performed by: UROLOGY

## 2023-09-18 PROCEDURE — 99213 PR OFFICE/OUTPT VISIT, EST, LEVL III, 20-29 MIN: ICD-10-PCS | Mod: S$GLB,,, | Performed by: UROLOGY

## 2023-09-18 PROCEDURE — 1101F PR PT FALLS ASSESS DOC 0-1 FALLS W/OUT INJ PAST YR: ICD-10-PCS | Mod: CPTII,S$GLB,, | Performed by: UROLOGY

## 2023-09-18 PROCEDURE — 87086 URINE CULTURE/COLONY COUNT: CPT | Performed by: UROLOGY

## 2023-09-18 PROCEDURE — 87186 SC STD MICRODIL/AGAR DIL: CPT | Performed by: UROLOGY

## 2023-09-18 NOTE — PROGRESS NOTES
Subjective:       Patient ID: Dutch Olivier is a 78 y.o. male.    Chief Complaint: Follow-up    HPI    78-year-old with a distant history of prostate cancer.  He underwent radical prostatectomy in 2000 by Dr. Landers.  His PSA had been undetectable however in 2015 we begin to see a slight increase in his PSA.  His PSA increased to 21.5.  Bone scan was obtained which did show 2 suspicious lesions.  He began androgen ablation.  He was given his 1st Eligard injection in February 2021.  He also began apalutamide in May 2021.  His most recent PSA is undetectable.  He discontinued apalutamide in mid January 2023 as it was too expensive and is now taking bicalutamide.  He is otherwise doing well.  He has no bothersome urinary symptoms.  He denies hematuria and dysuria.   He has no bony pain.  Urine dipstick shows negative for nitrites, glucose, positive for 2+leukocytes, trace blood, trace protein.       Component PSA Diagnostic   Latest Ref Rng & Units 0.00 - 4.00 ng/mL   9/13/2023 <0.01   3/10/2023 <0.01   9/7/2022 <0.01   3/10/2022 <0.01   8/25/2021 <0.01   6/2/2021 0.06   2/5/2021 23.5 (H)   2/2/2021 21.5 (H)   7/7/2020 1.1   5/9/2019 0.07   1/16/2018 0.04   1/12/2016 0.02       Review of Systems   Constitutional:  Negative for fever.   Genitourinary:  Positive for frequency. Negative for dysuria and hematuria.       Objective:      Physical Exam  Vitals reviewed.   Constitutional:       Appearance: He is well-developed.   Pulmonary:      Effort: Pulmonary effort is normal.   Skin:     Findings: No rash.   Neurological:      Mental Status: He is alert and oriented to person, place, and time.         Assessment:       1. Prostate cancer    2. Urinary urgency        Plan:       Prostate cancer  -     POCT Urinalysis(Instrument)  -     Prostate Specific Antigen, Diagnostic; Future; Expected date: 03/18/2024    Urinary urgency  -     Urine culture      Follow-up 6 months with repeat PSA

## 2023-09-20 ENCOUNTER — INFUSION (OUTPATIENT)
Dept: INFUSION THERAPY | Facility: HOSPITAL | Age: 78
End: 2023-09-20
Attending: UROLOGY
Payer: MEDICARE

## 2023-09-20 ENCOUNTER — OFFICE VISIT (OUTPATIENT)
Dept: FAMILY MEDICINE | Facility: CLINIC | Age: 78
End: 2023-09-20
Payer: MEDICARE

## 2023-09-20 VITALS
HEIGHT: 74 IN | DIASTOLIC BLOOD PRESSURE: 78 MMHG | WEIGHT: 189.69 LBS | RESPIRATION RATE: 16 BRPM | TEMPERATURE: 98 F | OXYGEN SATURATION: 96 % | HEART RATE: 62 BPM | BODY MASS INDEX: 24.34 KG/M2 | SYSTOLIC BLOOD PRESSURE: 144 MMHG

## 2023-09-20 VITALS
HEART RATE: 62 BPM | RESPIRATION RATE: 16 BRPM | SYSTOLIC BLOOD PRESSURE: 144 MMHG | DIASTOLIC BLOOD PRESSURE: 78 MMHG | OXYGEN SATURATION: 96 % | BODY MASS INDEX: 24.34 KG/M2 | WEIGHT: 189.69 LBS | HEIGHT: 74 IN

## 2023-09-20 DIAGNOSIS — F17.200 TOBACCO USE DISORDER: ICD-10-CM

## 2023-09-20 DIAGNOSIS — E78.2 MIXED HYPERLIPIDEMIA: ICD-10-CM

## 2023-09-20 DIAGNOSIS — I34.0 MILD MITRAL REGURGITATION: ICD-10-CM

## 2023-09-20 DIAGNOSIS — C61 PROSTATE CANCER METASTATIC TO BONE: ICD-10-CM

## 2023-09-20 DIAGNOSIS — R05.9 COUGH, UNSPECIFIED TYPE: ICD-10-CM

## 2023-09-20 DIAGNOSIS — C61 PROSTATE CANCER: Primary | ICD-10-CM

## 2023-09-20 DIAGNOSIS — I10 ESSENTIAL HYPERTENSION: ICD-10-CM

## 2023-09-20 DIAGNOSIS — Z00.00 MEDICARE ANNUAL WELLNESS VISIT, SUBSEQUENT: Primary | ICD-10-CM

## 2023-09-20 DIAGNOSIS — J44.9 CHRONIC OBSTRUCTIVE PULMONARY DISEASE, UNSPECIFIED COPD TYPE: ICD-10-CM

## 2023-09-20 DIAGNOSIS — I27.20 PULMONARY HYPERTENSION: ICD-10-CM

## 2023-09-20 DIAGNOSIS — I35.0 AORTIC VALVE STENOSIS, MODERATE: ICD-10-CM

## 2023-09-20 DIAGNOSIS — C79.51 PROSTATE CANCER METASTATIC TO BONE: ICD-10-CM

## 2023-09-20 PROCEDURE — 63600175 PHARM REV CODE 636 W HCPCS: Mod: JZ,JG,PN | Performed by: UROLOGY

## 2023-09-20 PROCEDURE — 99999 PR PBB SHADOW E&M-EST. PATIENT-LVL IV: CPT | Mod: PBBFAC,,, | Performed by: INTERNAL MEDICINE

## 2023-09-20 PROCEDURE — 3078F PR MOST RECENT DIASTOLIC BLOOD PRESSURE < 80 MM HG: ICD-10-PCS | Mod: CPTII,S$GLB,, | Performed by: INTERNAL MEDICINE

## 2023-09-20 PROCEDURE — 3288F FALL RISK ASSESSMENT DOCD: CPT | Mod: CPTII,S$GLB,, | Performed by: INTERNAL MEDICINE

## 2023-09-20 PROCEDURE — 3288F PR FALLS RISK ASSESSMENT DOCUMENTED: ICD-10-PCS | Mod: CPTII,S$GLB,, | Performed by: INTERNAL MEDICINE

## 2023-09-20 PROCEDURE — G0439 PPPS, SUBSEQ VISIT: HCPCS | Mod: S$GLB,,, | Performed by: INTERNAL MEDICINE

## 2023-09-20 PROCEDURE — 3078F DIAST BP <80 MM HG: CPT | Mod: CPTII,S$GLB,, | Performed by: INTERNAL MEDICINE

## 2023-09-20 PROCEDURE — 96402 CHEMO HORMON ANTINEOPL SQ/IM: CPT | Mod: PN

## 2023-09-20 PROCEDURE — 3077F SYST BP >= 140 MM HG: CPT | Mod: CPTII,S$GLB,, | Performed by: INTERNAL MEDICINE

## 2023-09-20 PROCEDURE — 1159F MED LIST DOCD IN RCRD: CPT | Mod: CPTII,S$GLB,, | Performed by: INTERNAL MEDICINE

## 2023-09-20 PROCEDURE — G0439 PR MEDICARE ANNUAL WELLNESS SUBSEQUENT VISIT: ICD-10-PCS | Mod: S$GLB,,, | Performed by: INTERNAL MEDICINE

## 2023-09-20 PROCEDURE — 1160F PR REVIEW ALL MEDS BY PRESCRIBER/CLIN PHARMACIST DOCUMENTED: ICD-10-PCS | Mod: CPTII,S$GLB,, | Performed by: INTERNAL MEDICINE

## 2023-09-20 PROCEDURE — 1159F PR MEDICATION LIST DOCUMENTED IN MEDICAL RECORD: ICD-10-PCS | Mod: CPTII,S$GLB,, | Performed by: INTERNAL MEDICINE

## 2023-09-20 PROCEDURE — 1101F PR PT FALLS ASSESS DOC 0-1 FALLS W/OUT INJ PAST YR: ICD-10-PCS | Mod: CPTII,S$GLB,, | Performed by: INTERNAL MEDICINE

## 2023-09-20 PROCEDURE — 99213 OFFICE O/P EST LOW 20 MIN: CPT | Mod: 25,S$GLB,, | Performed by: INTERNAL MEDICINE

## 2023-09-20 PROCEDURE — 99213 PR OFFICE/OUTPT VISIT, EST, LEVL III, 20-29 MIN: ICD-10-PCS | Mod: 25,S$GLB,, | Performed by: INTERNAL MEDICINE

## 2023-09-20 PROCEDURE — 99999 PR PBB SHADOW E&M-EST. PATIENT-LVL IV: ICD-10-PCS | Mod: PBBFAC,,, | Performed by: INTERNAL MEDICINE

## 2023-09-20 PROCEDURE — 3077F PR MOST RECENT SYSTOLIC BLOOD PRESSURE >= 140 MM HG: ICD-10-PCS | Mod: CPTII,S$GLB,, | Performed by: INTERNAL MEDICINE

## 2023-09-20 PROCEDURE — 1160F RVW MEDS BY RX/DR IN RCRD: CPT | Mod: CPTII,S$GLB,, | Performed by: INTERNAL MEDICINE

## 2023-09-20 PROCEDURE — 1101F PT FALLS ASSESS-DOCD LE1/YR: CPT | Mod: CPTII,S$GLB,, | Performed by: INTERNAL MEDICINE

## 2023-09-20 RX ORDER — PRAVASTATIN SODIUM 40 MG/1
40 TABLET ORAL DAILY
Qty: 90 TABLET | Refills: 3 | Status: SHIPPED | OUTPATIENT
Start: 2023-09-20

## 2023-09-20 RX ORDER — BENAZEPRIL HYDROCHLORIDE 20 MG/1
20 TABLET ORAL 2 TIMES DAILY
Qty: 180 TABLET | Refills: 3 | Status: SHIPPED | OUTPATIENT
Start: 2023-09-20

## 2023-09-20 RX ORDER — BICALUTAMIDE 50 MG/1
50 TABLET, FILM COATED ORAL DAILY
Qty: 30 TABLET | Refills: 11 | Status: CANCELLED | OUTPATIENT
Start: 2023-09-20 | End: 2024-09-19

## 2023-09-20 RX ORDER — HYDROCHLOROTHIAZIDE 12.5 MG/1
12.5 TABLET ORAL EVERY MORNING
Qty: 90 TABLET | Refills: 3 | Status: SHIPPED | OUTPATIENT
Start: 2023-09-20 | End: 2024-09-19

## 2023-09-20 RX ADMIN — LEUPROLIDE ACETATE 45 MG: KIT at 12:09

## 2023-09-20 NOTE — PROGRESS NOTES
The following components were reviewed and updated:   Medical history, Family History, Social history,Allergies and Current Medications, Health Risk Assessment, Health Maintenance, Living Situation, Depression Screening. .    HRA: patient feels overall is healthy.  Psychosocial and behavioral risks discussed.  BMI - 24   Weight loss discussed.   Diet - well balanced.   ADL: self sufficient in all  Instrumental ADL: patient is able to manage things like their medications and finances.    Memory or cognitive function - Patient has no issues with either   Ambulates normal. No recent falls.  Exercise - limited   Depression screening is negative.  Hearing--no deficits.  Vision - glasses no deficits.   Incontinence - none  Opiate Screen- Negative     Preventative health needs discussed and patient was given a printed list of what they have received and what they will need with in the next 5-10 years. Recommendations developed using the USPSTF age appropriate recommendations. Education, counseling, and referrals were provided as needed.   Screening schedule reviewed with patient     Advanced Care directive: not yet,  I recommend living will & POA   (Ie: pick a person who would make decisions for you if you were unable to make them for yourself, called a health care power of , and what kind of decisions you might make such as use of life sustaining treatments such as ventilators, tube feeding when faced with a life limiting illness recorded on a living will.     I have reviewed and updated the patient's current list of providers.     In addition to the patient's preventative review and discussion today, the patient also has other issues to discuss today with a separate summary of plan below:     Subjective:       Patient ID: Dutch Olivier is a 78 y.o. male.    Chief Complaint: Medicare AWV   HPI    PSA: < 0.01 8/2023 // + Prostate cancer w mets //mgmt urology Q 6 M changed due to $$ Erleada Q 6 m , Lupron & oral  Casodex   Colonoscopy:  yes in past approx 3  Dr Gallagher -defers now   Immunizations: Flu: yes  Tdap: rx pharm Pneumovax: 2012  Prevnar 20: ordered Shingles: Y Covid: yes   Smoker:  Yes // doesn't plan on quitting like xray yrly   Eye:  [facility]  Derm: Dr Myles Q 6 M      Wellness   Prev PCP Dr Nicole      Hypertension:  uncontrolled Rx benazepril 20 twice daily, will add Hctz 12.5     Mixed HLD: Tg 199, LDL 94.  Controlled Rx Pravastatin 40    Tg 211, 137 LDL     Atherosclerosis:  CT abdominal aorta lesions.     Heart murmur:  2022 Echo mod AS, mild MR. Ef wnl. Pulm HTN      Glucose intolerance:  improved G 110, 106 // a1c 4.9.  recommend low carb diet     Abnormal CBC:  Mild elevated MCV.   Does drink 3-4 glasses of wine daily.  Recommend reduced to no more than 2 daily.     Hx Melanoma right ear:  Excision with chemo stopped early due to side effect.  Oncology Dr. Gongora      Weight loss: improved since last visit prev 181 and now 189         ____________________________________________________________________________________________________  Assessment & Plan:  1. Medicare annual wellness visit, subsequent  - Urinalysis; Future    2. Essential hypertension  - benazepriL (LOTENSIN) 20 MG tablet; Take 1 tablet (20 mg total) by mouth 2 (two) times daily.  Dispense: 180 tablet; Refill: 3  - hydroCHLOROthiazide (HYDRODIURIL) 12.5 MG Tab; Take 1 tablet (12.5 mg total) by mouth every morning. Blood pressure  Dispense: 90 tablet; Refill: 3  - Urinalysis; Future    3. Mixed hyperlipidemia  - pravastatin (PRAVACHOL) 40 MG tablet; Take 1 tablet (40 mg total) by mouth once daily.  Dispense: 90 tablet; Refill: 3    4. Aortic valve stenosis, moderate    5. Prostate cancer metastatic to bone    6. Mild mitral regurgitation    7. Tobacco use disorder  - X-Ray Chest PA And Lateral; Future    8. Cough, unspecified type  - X-Ray Chest PA And Lateral; Future     Medicare annual wellness visit, subsequent  -     Urinalysis; Future;  Expected date: 09/20/2023    Essential hypertension  -     benazepriL (LOTENSIN) 20 MG tablet; Take 1 tablet (20 mg total) by mouth 2 (two) times daily.  Dispense: 180 tablet; Refill: 3  -     hydroCHLOROthiazide (HYDRODIURIL) 12.5 MG Tab; Take 1 tablet (12.5 mg total) by mouth every morning. Blood pressure  Dispense: 90 tablet; Refill: 3  -     Urinalysis; Future; Expected date: 09/20/2023    Mixed hyperlipidemia  -     pravastatin (PRAVACHOL) 40 MG tablet; Take 1 tablet (40 mg total) by mouth once daily.  Dispense: 90 tablet; Refill: 3    Aortic valve stenosis, moderate    Prostate cancer metastatic to bone    Mild mitral regurgitation    Tobacco use disorder  -     X-Ray Chest PA And Lateral; Future; Expected date: 09/20/2023    Cough, unspecified type  -     X-Ray Chest PA And Lateral; Future; Expected date: 09/20/2023    Other orders  -     (In Office Administered) Pneumococcal Conjugate Vaccine (20 Valent) (IM)        Continue to work on regular exercise, maintain healthy weight, balanced diet. Avoid unhealthy habits: smoking, excessive alcohol intake.     Disclaimer: This note was partly generated using dictation software which may occasionally result in transcription errors  ____________________________________________________________________________________________________  Review of Systems:  Review of Systems   Constitutional:  Negative for chills.   HENT:  Negative for drooling.    Eyes:  Negative for pain.   Respiratory:  Negative for choking.    Cardiovascular:  Negative for chest pain.   Gastrointestinal:  Negative for blood in stool.   Genitourinary:  Negative for hematuria.   Musculoskeletal:  Negative for joint swelling.   Skin:  Negative for pallor.   Neurological:  Negative for facial asymmetry.   Psychiatric/Behavioral:  Negative for confusion.        Objective:     Wt Readings from Last 3 Encounters:   09/20/23 86 kg (189 lb 11.3 oz)   09/20/23 86 kg (189 lb 11.3 oz)   09/18/23 86.1 kg (189 lb  13.1 oz)     BP Readings from Last 3 Encounters:   09/20/23 (!) 144/78   09/20/23 (!) 144/78   09/12/23 (!) 183/73       Lab Results   Component Value Date    WBC 9.43 09/13/2023    HGB 14.6 09/13/2023    HCT 42.7 09/13/2023     09/13/2023     09/13/2023    K 4.2 09/13/2023     09/13/2023    ALT 15 09/13/2023    AST 17 09/13/2023    CO2 21 (L) 09/13/2023    CREATININE 0.9 09/13/2023    BUN 8 09/13/2023    PSA <0.010 09/12/2012     09/13/2023      Hemoglobin A1C   Date Value Ref Range Status   08/09/2022 4.9 4.0 - 5.6 % Final     Comment:     ADA Screening Guidelines:  5.7-6.4%  Consistent with prediabetes  >or=6.5%  Consistent with diabetes    High levels of fetal hemoglobin interfere with the HbA1C  assay. Heterozygous hemoglobin variants (HbS, HgC, etc)do  not significantly interfere with this assay.   However, presence of multiple variants may affect accuracy.     10/20/2006 5.3 4.5 - 6.2 % Final      Lab Results   Component Value Date    TSH 0.721 09/13/2023    TSH 1.005 08/09/2022    TSH 1.36 12/17/2010     Lab Results   Component Value Date    FREET4 1.13 12/17/2010     Lab Results   Component Value Date    LDLCALC 94.2 09/13/2023    LDLCALC 105.8 08/09/2022    LDLCALC 107.6 08/06/2021     Lab Results   Component Value Date    TRIG 199 (H) 09/13/2023    TRIG 211 (H) 08/09/2022    TRIG 182 (H) 08/06/2021            Physical Exam  Constitutional:       Appearance: Normal appearance.   HENT:      Head: Normocephalic and atraumatic.   Eyes:      Extraocular Movements: Extraocular movements intact.      Conjunctiva/sclera: Conjunctivae normal.      Pupils: Pupils are equal, round, and reactive to light.   Cardiovascular:      Rate and Rhythm: Normal rate and regular rhythm.      Heart sounds: Murmur heard.   Pulmonary:      Effort: Pulmonary effort is normal.      Breath sounds: Normal breath sounds.   Musculoskeletal:      Right lower leg: No edema.      Left lower leg: No edema.    Neurological:      Mental Status: He is alert and oriented to person, place, and time.   Psychiatric:         Mood and Affect: Mood normal.         Medication List with Changes/Refills   New Medications    HYDROCHLOROTHIAZIDE (HYDRODIURIL) 12.5 MG TAB    Take 1 tablet (12.5 mg total) by mouth every morning. Blood pressure   Current Medications    B COMPLEX VITAMINS TABLET    Take 1 tablet by mouth once daily.    BICALUTAMIDE (CASODEX) 50 MG TAB    Take 1 tablet (50 mg total) by mouth once daily.    MULTIVITAMIN CAPSULE    Take 1 capsule by mouth once daily.   Changed and/or Refilled Medications    Modified Medication Previous Medication    BENAZEPRIL (LOTENSIN) 20 MG TABLET benazepriL (LOTENSIN) 20 MG tablet       Take 1 tablet (20 mg total) by mouth 2 (two) times daily.    Take 1 tablet (20 mg total) by mouth 2 (two) times daily.    PRAVASTATIN (PRAVACHOL) 40 MG TABLET pravastatin (PRAVACHOL) 40 MG tablet       Take 1 tablet (40 mg total) by mouth once daily.    Take 1 tablet (40 mg total) by mouth once daily.

## 2023-09-20 NOTE — PATIENT INSTRUCTIONS
Counseling and Referral of Other Preventative  (Italic type indicates deductible and co-insurance are waived)    No orders of the defined types were placed in this encounter.     The following information is provided to all patients.  This information is to help you find resources for any of the problems found today that may be affecting your health:                Living healthy guide: www.[facility].[place]       Understanding Diabetes: www.diabetes.org       Eating healthy: www.cdc.gov/healthyweight      CDC home safety checklist: www.cdc.gov/steadi/patient.html      Agency on Aging: www.goea.[place].[facility]       Alcoholics anonymous (AA): www.aa.org      Physical Activity: www.cat.nih.gov/ck7wxcf       Tobacco use: www.quitwithusla.org

## 2023-09-25 LAB — BACTERIA UR CULT: ABNORMAL

## 2023-10-02 RX ORDER — AMOXICILLIN AND CLAVULANATE POTASSIUM 875; 125 MG/1; MG/1
1 TABLET, FILM COATED ORAL 2 TIMES DAILY
Qty: 14 TABLET | Refills: 0 | Status: SHIPPED | OUTPATIENT
Start: 2023-10-02 | End: 2024-04-01

## 2023-10-04 ENCOUNTER — HOSPITAL ENCOUNTER (OUTPATIENT)
Dept: RADIOLOGY | Facility: HOSPITAL | Age: 78
Discharge: HOME | End: 2023-10-04
Attending: INTERNAL MEDICINE
Payer: MEDICARE

## 2023-10-04 ENCOUNTER — CLINICAL SUPPORT (OUTPATIENT)
Dept: FAMILY MEDICINE | Facility: CLINIC | Age: 78
End: 2023-10-04
Payer: MEDICARE

## 2023-10-04 DIAGNOSIS — R05.9 COUGH, UNSPECIFIED TYPE: ICD-10-CM

## 2023-10-04 DIAGNOSIS — F17.200 TOBACCO USE DISORDER: ICD-10-CM

## 2023-10-04 DIAGNOSIS — Z01.30 BP CHECK: Primary | ICD-10-CM

## 2023-10-04 PROCEDURE — G0009 ADMIN PNEUMOCOCCAL VACCINE: HCPCS | Mod: S$GLB,,, | Performed by: INTERNAL MEDICINE

## 2023-10-04 PROCEDURE — 71046 XR CHEST PA AND LATERAL: ICD-10-PCS | Mod: 26,,, | Performed by: RADIOLOGY

## 2023-10-04 PROCEDURE — 90677 PCV20 VACCINE IM: CPT | Mod: S$GLB,,, | Performed by: INTERNAL MEDICINE

## 2023-10-04 PROCEDURE — 71046 X-RAY EXAM CHEST 2 VIEWS: CPT | Mod: TC,PN

## 2023-10-04 PROCEDURE — 71046 X-RAY EXAM CHEST 2 VIEWS: CPT | Mod: 26,,, | Performed by: RADIOLOGY

## 2023-10-04 NOTE — PROGRESS NOTES
Dutch Olivier 78 y.o. male is here today for Blood Pressure check.   History of HTN yes.    Review of patient's allergies indicates:   Allergen Reactions    Aspirin Hives     Creatinine   Date Value Ref Range Status   09/13/2023 0.9 0.5 - 1.4 mg/dL Final     Sodium   Date Value Ref Range Status   09/13/2023 141 136 - 145 mmol/L Final     Potassium   Date Value Ref Range Status   09/13/2023 4.2 3.5 - 5.1 mmol/L Final   ]  Patient verifies taking blood pressure medications on a regular basis at the same time of the day.     Current Outpatient Medications:     amoxicillin-clavulanate 875-125mg (AUGMENTIN) 875-125 mg per tablet, Take 1 tablet by mouth 2 (two) times daily., Disp: 14 tablet, Rfl: 0    b complex vitamins tablet, Take 1 tablet by mouth once daily., Disp: , Rfl:     benazepriL (LOTENSIN) 20 MG tablet, Take 1 tablet (20 mg total) by mouth 2 (two) times daily., Disp: 180 tablet, Rfl: 3    bicalutamide (CASODEX) 50 MG Tab, Take 1 tablet (50 mg total) by mouth once daily., Disp: 30 tablet, Rfl: 11    hydroCHLOROthiazide (HYDRODIURIL) 12.5 MG Tab, Take 1 tablet (12.5 mg total) by mouth every morning. Blood pressure, Disp: 90 tablet, Rfl: 3    multivitamin capsule, Take 1 capsule by mouth once daily., Disp: , Rfl:     pravastatin (PRAVACHOL) 40 MG tablet, Take 1 tablet (40 mg total) by mouth once daily., Disp: 90 tablet, Rfl: 3  Does patient have record of home blood pressure readings no. Readings have been averaging none.   Last dose of blood pressure medication was taken at am.  Patient is asymptomatic.   Complains of no symptoms.      ,   .    Blood pressure reading after 15 minutes was 116/76, Pulse 67.  Dr. Rice notified.

## 2023-10-18 DIAGNOSIS — R82.90 ABNORMAL URINE: Primary | ICD-10-CM

## 2023-10-25 ENCOUNTER — LAB VISIT (OUTPATIENT)
Dept: LAB | Facility: HOSPITAL | Age: 78
End: 2023-10-25
Attending: INTERNAL MEDICINE
Payer: MEDICARE

## 2023-10-25 DIAGNOSIS — R82.90 ABNORMAL URINE: ICD-10-CM

## 2023-10-25 LAB
BACTERIA #/AREA URNS AUTO: ABNORMAL /HPF
BILIRUB UR QL STRIP: NEGATIVE
CLARITY UR REFRACT.AUTO: CLEAR
COLOR UR AUTO: YELLOW
GLUCOSE UR QL STRIP: NEGATIVE
HGB UR QL STRIP: NEGATIVE
KETONES UR QL STRIP: NEGATIVE
LEUKOCYTE ESTERASE UR QL STRIP: ABNORMAL
MICROSCOPIC COMMENT: ABNORMAL
NITRITE UR QL STRIP: NEGATIVE
PH UR STRIP: 5 [PH] (ref 5–8)
PROT UR QL STRIP: NEGATIVE
RBC #/AREA URNS AUTO: 7 /HPF (ref 0–4)
SP GR UR STRIP: 1.02 (ref 1–1.03)
SQUAMOUS #/AREA URNS AUTO: 0 /HPF
URN SPEC COLLECT METH UR: ABNORMAL
WBC #/AREA URNS AUTO: 93 /HPF (ref 0–5)

## 2023-10-25 PROCEDURE — 87086 URINE CULTURE/COLONY COUNT: CPT | Performed by: INTERNAL MEDICINE

## 2023-10-25 PROCEDURE — 81001 URINALYSIS AUTO W/SCOPE: CPT | Performed by: INTERNAL MEDICINE

## 2023-10-26 LAB
BACTERIA UR CULT: NORMAL
BACTERIA UR CULT: NORMAL

## 2023-11-02 ENCOUNTER — TELEPHONE (OUTPATIENT)
Dept: FAMILY MEDICINE | Facility: CLINIC | Age: 78
End: 2023-11-02
Payer: MEDICARE

## 2023-11-02 DIAGNOSIS — N30.90 CYSTITIS: Primary | ICD-10-CM

## 2023-11-02 RX ORDER — CEPHALEXIN 500 MG/1
500 CAPSULE ORAL EVERY 12 HOURS
Qty: 20 CAPSULE | Refills: 0 | Status: SHIPPED | OUTPATIENT
Start: 2023-11-02 | End: 2024-04-01

## 2023-11-02 NOTE — TELEPHONE ENCOUNTER
No specific infection noted on urine culture.  It did have some leukocytes in his urine screen.    Since he is having some symptoms, I am going to start him on a course of antibiotics.        If symptoms do not Improve I want him to follow-up with Urology for evaluation.

## 2023-11-02 NOTE — TELEPHONE ENCOUNTER
Mild pain with urination and a little bit of urgency but he isnt really uncomfortable but would like to know what the next steps will be from UA done on 10/25

## 2023-11-02 NOTE — TELEPHONE ENCOUNTER
----- Message from Olga Cabello sent at 11/2/2023 10:30 AM CDT -----  Contact: self  Type: Needs Medical Advice  Who Called:  pt  Best Call Back Number:[phone]   Additional Information: need to follow up on latest urinal ist

## 2023-12-05 ENCOUNTER — PATIENT MESSAGE (OUTPATIENT)
Dept: ADMINISTRATIVE | Facility: HOSPITAL | Age: 78
End: 2023-12-05
Payer: MEDICARE

## 2023-12-20 ENCOUNTER — CLINICAL SUPPORT (OUTPATIENT)
Dept: FAMILY MEDICINE | Facility: CLINIC | Age: 78
End: 2023-12-20
Payer: MEDICARE

## 2023-12-20 VITALS — SYSTOLIC BLOOD PRESSURE: 118 MMHG | DIASTOLIC BLOOD PRESSURE: 74 MMHG

## 2023-12-20 DIAGNOSIS — Z01.30 BP CHECK: Primary | ICD-10-CM

## 2023-12-20 PROCEDURE — 99499 NO LOS: ICD-10-PCS | Mod: S$GLB,,, | Performed by: INTERNAL MEDICINE

## 2023-12-20 PROCEDURE — 99499 UNLISTED E&M SERVICE: CPT | Mod: S$GLB,,, | Performed by: INTERNAL MEDICINE

## 2023-12-20 NOTE — PROGRESS NOTES
Dutch Olivier 78 y.o. male is here today for Blood Pressure check.   History of HTN yes.    Review of patient's allergies indicates:   Allergen Reactions    Aspirin Hives     Creatinine   Date Value Ref Range Status   09/13/2023 0.9 0.5 - 1.4 mg/dL Final     Sodium   Date Value Ref Range Status   09/13/2023 141 136 - 145 mmol/L Final     Potassium   Date Value Ref Range Status   09/13/2023 4.2 3.5 - 5.1 mmol/L Final   ]  Patient verifies taking blood pressure medications on a regular basis at the same time of the day.     Current Outpatient Medications:     amoxicillin-clavulanate 875-125mg (AUGMENTIN) 875-125 mg per tablet, Take 1 tablet by mouth 2 (two) times daily., Disp: 14 tablet, Rfl: 0    b complex vitamins tablet, Take 1 tablet by mouth once daily., Disp: , Rfl:     benazepriL (LOTENSIN) 20 MG tablet, Take 1 tablet (20 mg total) by mouth 2 (two) times daily., Disp: 180 tablet, Rfl: 3    bicalutamide (CASODEX) 50 MG Tab, Take 1 tablet (50 mg total) by mouth once daily., Disp: 30 tablet, Rfl: 11    cephALEXin (KEFLEX) 500 MG capsule, Take 1 capsule (500 mg total) by mouth every 12 (twelve) hours., Disp: 20 capsule, Rfl: 0    hydroCHLOROthiazide (HYDRODIURIL) 12.5 MG Tab, Take 1 tablet (12.5 mg total) by mouth every morning. Blood pressure, Disp: 90 tablet, Rfl: 3    multivitamin capsule, Take 1 capsule by mouth once daily., Disp: , Rfl:     pravastatin (PRAVACHOL) 40 MG tablet, Take 1 tablet (40 mg total) by mouth once daily., Disp: 90 tablet, Rfl: 3  Does patient have record of home blood pressure readings no. Readings have been averaging NA.   Last dose of blood pressure medication was taken at approx 730am today.  Patient is asymptomatic.   Complains of NA.    BP: 118/74 ,   .      Dr Arreola notified.  Pt will get new BP monitor and continue to monitor at home.

## 2024-01-23 ENCOUNTER — TELEPHONE (OUTPATIENT)
Dept: UROLOGY | Facility: CLINIC | Age: 79
End: 2024-01-23
Payer: MEDICARE

## 2024-01-23 NOTE — TELEPHONE ENCOUNTER
Spoke to the pt and he said that he was asymptomatic after taking 2 rounds of antibiotics 3 months from his PCP.  So his appointment with Chantal was cancelled and he will f/u with Dr Kwan for his Prostate Cancer in 6 months as planned.

## 2024-03-01 ENCOUNTER — TELEPHONE (OUTPATIENT)
Dept: UROLOGY | Facility: CLINIC | Age: 79
End: 2024-03-01
Payer: MEDICARE

## 2024-03-01 NOTE — TELEPHONE ENCOUNTER
----- Message from Olga Cabello sent at 3/1/2024  1:39 PM CST -----  Contact: self  Type: Needs Medical Advice  Who Called:  pt  Best Call Back Number: [phone]   Additional Information:pt states he left a message on tuesday and no one has responded he's trying to set up his 6 month injection with the office.please call pt would also like to get lab orders for psa prior to his visit so that they can go over during the visit

## 2024-03-04 RX ORDER — BICALUTAMIDE 50 MG/1
50 TABLET, FILM COATED ORAL DAILY
Qty: 30 TABLET | Refills: 11 | Status: SHIPPED | OUTPATIENT
Start: 2024-03-04 | End: 2024-04-01

## 2024-03-19 ENCOUNTER — OFFICE VISIT (OUTPATIENT)
Dept: CARDIOLOGY | Facility: CLINIC | Age: 79
End: 2024-03-19
Payer: MEDICARE

## 2024-03-19 ENCOUNTER — LAB VISIT (OUTPATIENT)
Dept: LAB | Facility: HOSPITAL | Age: 79
End: 2024-03-19
Attending: UROLOGY
Payer: MEDICARE

## 2024-03-19 VITALS
DIASTOLIC BLOOD PRESSURE: 70 MMHG | BODY MASS INDEX: 23.91 KG/M2 | HEART RATE: 62 BPM | SYSTOLIC BLOOD PRESSURE: 145 MMHG | HEIGHT: 74 IN | WEIGHT: 186.31 LBS

## 2024-03-19 DIAGNOSIS — Z86.002 HISTORY OF CARCINOMA IN SITU OF PROSTATE: ICD-10-CM

## 2024-03-19 DIAGNOSIS — C61 PROSTATE CANCER: ICD-10-CM

## 2024-03-19 DIAGNOSIS — I70.8 AORTO-ILIAC ATHEROSCLEROSIS: ICD-10-CM

## 2024-03-19 DIAGNOSIS — I27.20 PULMONARY HYPERTENSION: ICD-10-CM

## 2024-03-19 DIAGNOSIS — I35.0 AORTIC VALVE STENOSIS, MODERATE: ICD-10-CM

## 2024-03-19 DIAGNOSIS — I70.0 AORTO-ILIAC ATHEROSCLEROSIS: ICD-10-CM

## 2024-03-19 DIAGNOSIS — I34.0 MILD MITRAL REGURGITATION: ICD-10-CM

## 2024-03-19 DIAGNOSIS — F17.200 TOBACCO USE DISORDER: ICD-10-CM

## 2024-03-19 DIAGNOSIS — E78.2 MIXED HYPERLIPIDEMIA: ICD-10-CM

## 2024-03-19 DIAGNOSIS — I10 ESSENTIAL HYPERTENSION: ICD-10-CM

## 2024-03-19 DIAGNOSIS — I35.8 AORTIC VALVE SCLEROSIS: Primary | ICD-10-CM

## 2024-03-19 LAB — COMPLEXED PSA SERPL-MCNC: <0.01 NG/ML (ref 0–4)

## 2024-03-19 PROCEDURE — 36415 COLL VENOUS BLD VENIPUNCTURE: CPT | Mod: PO | Performed by: UROLOGY

## 2024-03-19 PROCEDURE — 99214 OFFICE O/P EST MOD 30 MIN: CPT | Mod: S$GLB,,, | Performed by: INTERNAL MEDICINE

## 2024-03-19 PROCEDURE — 99999 PR PBB SHADOW E&M-EST. PATIENT-LVL III: CPT | Mod: PBBFAC,,, | Performed by: INTERNAL MEDICINE

## 2024-03-19 PROCEDURE — 84153 ASSAY OF PSA TOTAL: CPT | Performed by: UROLOGY

## 2024-03-19 NOTE — PROGRESS NOTES
Subjective:    Patient ID:  Dutch Olivier is a 78 y.o. male patient here for evaluation Follow-up (6 month)      History of Present Illness:  Cardiology follow-up.  Coronary disease.  Peripheral arterial disease.  Stable HATCH, ongoing tobacco use.  COPD.  Last noninvasive cardiac assessment with unremarkable echo 10/2022 EF 55% moderate AS.  Nuclear perfusion imaging negative for ischemia 1/23.     Peripheral arterial disease with nonobstructive carotid disease, decreased abnormal lower extremity pulses.    Ongoing treatment for prostate CA.        Review of patient's allergies indicates:   Allergen Reactions    Aspirin Hives       Past Medical History:   Diagnosis Date    Amblyopia     OS    Cancer     melanoma, prostate    Cataract     Complication of anesthesia     says he sometimes is slow to awaken    COPD (chronic obstructive pulmonary disease)     no oxygen, no inhalers, or nebulizers    Diverticulosis     History of colonic polyps     History of malignant melanoma 01/01/2011    right ear, had chemo//Dr Gongora    HTN (hypertension)     Hyperlipemia      Past Surgical History:   Procedure Laterality Date    EXTERNAL EAR SURGERY  2011    melanoma removed right ear    HERNIA REPAIR      [facility]    PORTACATH PLACEMENT  2011    later removed    PROSTATE SURGERY  2003    prostatectomy     Social History     Tobacco Use    Smoking status: Every Day     Current packs/day: 1.00     Types: Cigarettes    Smokeless tobacco: Never   Substance Use Topics    Alcohol use: Yes     Comment: 3-4 glasses nightly wine    Drug use: No        Review of Systems:    As noted in HPI in addition      REVIEW OF SYSTEMS  Review of Systems   Constitutional: Negative for decreased appetite, diaphoresis, night sweats, weight gain and weight loss.   HENT:  Negative for nosebleeds and odynophagia.    Eyes:  Negative for double vision and photophobia.   Cardiovascular:  Negative for chest pain, claudication, cyanosis, dyspnea on exertion,  irregular heartbeat, leg swelling, near-syncope, orthopnea, palpitations, paroxysmal nocturnal dyspnea and syncope.   Respiratory:  Negative for cough, hemoptysis, shortness of breath and wheezing.    Hematologic/Lymphatic: Negative for adenopathy.   Skin:  Negative for flushing, skin cancer and suspicious lesions.   Musculoskeletal:  Negative for gout, myalgias and neck pain.   Gastrointestinal:  Negative for abdominal pain, heartburn, hematemesis and hematochezia.   Genitourinary:  Negative for bladder incontinence, hesitancy and nocturia.   Neurological:  Negative for focal weakness, headaches, light-headedness and paresthesias.   Psychiatric/Behavioral:  Negative for memory loss and substance abuse.               Objective:        Vitals:    03/19/24 0852   BP: (!) 145/70   Pulse: 62       Lab Results   Component Value Date    WBC 9.43 09/13/2023    HGB 14.6 09/13/2023    HCT 42.7 09/13/2023     09/13/2023    CHOL 185 09/13/2023    TRIG 199 (H) 09/13/2023    HDL 51 09/13/2023    ALT 15 09/13/2023    AST 17 09/13/2023     09/13/2023    K 4.2 09/13/2023     09/13/2023    CREATININE 0.9 09/13/2023    BUN 8 09/13/2023    CO2 21 (L) 09/13/2023    TSH 0.721 09/13/2023    PSA <0.010 09/12/2012    HGBA1C 4.9 08/09/2022        ECHOCARDIOGRAM RESULTS  Results for orders placed in visit on 10/11/22    Echo    Interpretation Summary  · The left ventricle is normal in size with eccentric hypertrophy and normal systolic function.  · The estimated ejection fraction is 55%.  · Normal left ventricular diastolic function.  · Normal right ventricular size with normal right ventricular systolic function.  · There is mild-to-moderate aortic valve stenosis.  · Aortic valve area is 1.44 cm2; peak velocity is 3.17 m/s; mean gradient is 24 mmHg.  · Mild mitral regurgitation.  · Mild tricuspid regurgitation.  · Normal central venous pressure (3 mmHg).  · The estimated PA systolic pressure is 33  mmHg.        CURRENT/PREVIOUS VISIT EKG  No results found for this or any previous visit.  No valid procedures specified.   Results for orders placed during the hospital encounter of 01/18/23    Nuclear Stress - Cardiology Interpreted    Interpretation Summary    Normal myocardial perfusion scan. There is no evidence of myocardial ischemia or infarction.    There is a  mild intensity fixed perfusion abnormality in the  wall of the left ventricle secondary to diaphragm attenuation.    There is mild to moderate apical thinning which is a normal variant.    The gated perfusion images showed an ejection fraction of 68% post stress.    There is normal wall motion post stress.    LV cavity size is normal at rest and normal at stress.    The ECG portion of the study is negative for ischemia.    The patient reported no chest pain during the stress test.    Low risk study for ischemia.    No valid procedures specified.    PHYSICAL EXAM  CONSTITUTIONAL: Well built, well nourished in no apparent distress  NECK:  Soft bilateral carotid bruit, no JVD  LUNGS: CTA  CHEST WALL: no tenderness,  HEART: regular rate and rhythm, S1, S2 distant, 2/6 crescendo decrescendo murmur aortic area.  ABDOMEN: soft, non-tender; bowel sounds normal; no masses,  no organomegaly  EXTREMITIES: Extremities normal, no edema, no calf tenderness noted  NEURO: AAO X 3    I HAVE REVIEWED :    The vital signs, nurses notes, and all the pertinent radiology and labs.         Current Outpatient Medications   Medication Instructions    amoxicillin-clavulanate 875-125mg (AUGMENTIN) 875-125 mg per tablet 1 tablet, Oral, 2 times daily    b complex vitamins tablet 1 tablet, Oral, Daily,      benazepriL (LOTENSIN) 20 mg, Oral, 2 times daily    bicalutamide (CASODEX) 50 mg, Oral, Daily    cephALEXin (KEFLEX) 500 mg, Oral, Every 12 hours    hydroCHLOROthiazide (HYDRODIURIL) 12.5 mg, Oral, Every morning, Blood pressure    multivitamin capsule 1 capsule, Oral, Daily,       pravastatin (PRAVACHOL) 40 mg, Oral, Daily          Assessment:   Valvular heart disease, moderate AS, preserved ejection fraction.  Negative nuclear study 01/2023.    Peripheral arterial disease, nonobstructive carotid disease, ultrasound 12/2022.  Decreased pedal pulses bilaterally, no claudication.    Hypertension dyslipidemia, ongoing tobacco use.  COPD.    Plan:     Risk factor modification.  Tobacco cessation.  Six-month.  Follow-up primary care.  Update echo.  Call results.      No follow-ups on file.

## 2024-03-20 ENCOUNTER — INFUSION (OUTPATIENT)
Dept: INFUSION THERAPY | Facility: HOSPITAL | Age: 79
End: 2024-03-20
Attending: UROLOGY
Payer: MEDICARE

## 2024-03-20 VITALS
RESPIRATION RATE: 20 BRPM | HEIGHT: 74 IN | BODY MASS INDEX: 23.91 KG/M2 | HEART RATE: 74 BPM | WEIGHT: 186.31 LBS | OXYGEN SATURATION: 98 % | TEMPERATURE: 97 F | SYSTOLIC BLOOD PRESSURE: 123 MMHG | DIASTOLIC BLOOD PRESSURE: 63 MMHG

## 2024-03-20 DIAGNOSIS — C61 PROSTATE CANCER: Primary | ICD-10-CM

## 2024-03-20 PROCEDURE — 63600175 PHARM REV CODE 636 W HCPCS: Mod: JZ,JG,PN | Performed by: UROLOGY

## 2024-03-20 PROCEDURE — 96402 CHEMO HORMON ANTINEOPL SQ/IM: CPT | Mod: PN

## 2024-03-20 RX ADMIN — LEUPROLIDE ACETATE 45 MG: KIT at 12:03

## 2024-03-20 NOTE — PLAN OF CARE
.Pt tolerated lupron infusion well.  No adverse reaction noted.  IV flushed with NS and D/C per protocol.  Patient left clinic in no acute distress.

## 2024-04-01 ENCOUNTER — OFFICE VISIT (OUTPATIENT)
Dept: UROLOGY | Facility: CLINIC | Age: 79
End: 2024-04-01
Payer: MEDICARE

## 2024-04-01 VITALS — WEIGHT: 183 LBS | HEIGHT: 74 IN | BODY MASS INDEX: 23.49 KG/M2

## 2024-04-01 DIAGNOSIS — C61 PROSTATE CANCER: Primary | ICD-10-CM

## 2024-04-01 LAB
BILIRUBIN, UA POC OHS: ABNORMAL
BLOOD, UA POC OHS: ABNORMAL
CLARITY, UA POC OHS: ABNORMAL
COLOR, UA POC OHS: YELLOW
GLUCOSE, UA POC OHS: NEGATIVE
KETONES, UA POC OHS: ABNORMAL
LEUKOCYTES, UA POC OHS: ABNORMAL
NITRITE, UA POC OHS: NEGATIVE
PH, UA POC OHS: 6
PROTEIN, UA POC OHS: 100
SPECIFIC GRAVITY, UA POC OHS: 1.02
UROBILINOGEN, UA POC OHS: 1

## 2024-04-01 PROCEDURE — 99214 OFFICE O/P EST MOD 30 MIN: CPT | Mod: S$GLB,,, | Performed by: UROLOGY

## 2024-04-01 PROCEDURE — 99999 PR PBB SHADOW E&M-EST. PATIENT-LVL II: CPT | Mod: PBBFAC,,, | Performed by: UROLOGY

## 2024-04-01 PROCEDURE — 81003 URINALYSIS AUTO W/O SCOPE: CPT | Mod: QW,S$GLB,, | Performed by: UROLOGY

## 2024-04-01 RX ORDER — BICALUTAMIDE 50 MG/1
50 TABLET, FILM COATED ORAL DAILY
Qty: 90 TABLET | Refills: 3 | Status: SHIPPED | OUTPATIENT
Start: 2024-04-01 | End: 2025-04-01

## 2024-04-01 NOTE — PROGRESS NOTES
Subjective:       Patient ID: Dutch Olivier is a 78 y.o. male.    Chief Complaint: Prostate Cancer    HPI    78-year-old with a distant history of prostate cancer.  He underwent radical prostatectomy in 2000 by Dr. Landers.  His PSA had been undetectable however in 2015 we begin to see a slight increase in his PSA.  His PSA increased to 21.5.  Bone scan was obtained which did show 2 suspicious lesions.  He began androgen ablation.  He was given his 1st Eligard injection in February 2021.  He also began apalutamide in May 2021.  His most recent PSA is undetectable.  He discontinued apalutamide in mid January 2023 as it was too expensive and is now taking bicalutamide.  He is otherwise doing well.  He has no bothersome urinary symptoms.  He denies hematuria and dysuria.   His urinalysis suggest infection but previous cultures have been contaminated.  He has no bony pain.       Component PSA Diagnostic   Latest Ref Rng & Units 0.00 - 4.00 ng/mL   3/19/2024 <0.01   9/13/2023 <0.01   3/10/2023 <0.01   9/7/2022 <0.01   3/10/2022 <0.01   8/25/2021 <0.01   6/2/2021 0.06   2/5/2021 23.5 (H)   2/2/2021 21.5 (H)   7/7/2020 1.1   5/9/2019 0.07   1/16/2018 0.04   1/12/2016 0.02       Review of Systems   Constitutional:  Negative for fever.   Genitourinary:  Negative for dysuria and hematuria.       Objective:      Physical Exam  Vitals reviewed.   Constitutional:       Appearance: He is well-developed.   Pulmonary:      Effort: Pulmonary effort is normal.   Skin:     Findings: No rash.   Neurological:      Mental Status: He is alert and oriented to person, place, and time.         Assessment:       1. Prostate cancer        Plan:       Prostate cancer  -     POCT Urinalysis(Instrument)  -     Prostate Specific Antigen, Diagnostic; Future; Expected date: 10/01/2024      Continue bicalutamide and Lupron.  Follow up 6 months with PSA

## 2024-04-04 ENCOUNTER — PATIENT MESSAGE (OUTPATIENT)
Dept: ADMINISTRATIVE | Facility: HOSPITAL | Age: 79
End: 2024-04-04
Payer: MEDICARE

## 2024-04-15 ENCOUNTER — TELEPHONE (OUTPATIENT)
Dept: UROLOGY | Facility: CLINIC | Age: 79
End: 2024-04-15
Payer: MEDICARE

## 2024-04-15 DIAGNOSIS — R39.15 URINARY URGENCY: Primary | ICD-10-CM

## 2024-04-15 NOTE — TELEPHONE ENCOUNTER
----- Message from Wanda Monroe sent at 4/15/2024 10:22 AM CDT -----  Contact: pt  Type:  Needs Medical Advice    Who Called: the patient  Symptoms (please be specific):  How long has patient had these symptoms:    Pharmacy name and phone #:    Would the patient rather a call back or a response via [facility]? call back/  Best Call Back Number: [phone] (home) [phone] (work)   Additional Information: Pt states to please review latest UA and advise on possible UTI  Thanks

## 2024-04-22 ENCOUNTER — LAB VISIT (OUTPATIENT)
Dept: LAB | Facility: HOSPITAL | Age: 79
End: 2024-04-22
Attending: UROLOGY
Payer: MEDICARE

## 2024-04-22 DIAGNOSIS — R39.15 URINARY URGENCY: ICD-10-CM

## 2024-04-22 PROCEDURE — 87086 URINE CULTURE/COLONY COUNT: CPT | Performed by: UROLOGY

## 2024-04-22 PROCEDURE — 87077 CULTURE AEROBIC IDENTIFY: CPT | Performed by: UROLOGY

## 2024-04-22 PROCEDURE — 87186 SC STD MICRODIL/AGAR DIL: CPT | Performed by: UROLOGY

## 2024-04-22 PROCEDURE — 87088 URINE BACTERIA CULTURE: CPT | Performed by: UROLOGY

## 2024-04-25 LAB — BACTERIA UR CULT: ABNORMAL

## 2024-04-26 RX ORDER — AMOXICILLIN AND CLAVULANATE POTASSIUM 875; 125 MG/1; MG/1
1 TABLET, FILM COATED ORAL 2 TIMES DAILY
Qty: 14 TABLET | Refills: 0 | Status: SHIPPED | OUTPATIENT
Start: 2024-04-26

## 2024-08-15 ENCOUNTER — TELEPHONE (OUTPATIENT)
Dept: UROLOGY | Facility: CLINIC | Age: 79
End: 2024-08-15
Payer: MEDICARE

## 2024-08-15 NOTE — TELEPHONE ENCOUNTER
----- Message from Raquel Blank sent at 8/15/2024  8:38 AM CDT -----  Contact: Patient  Type:  Sooner Appointment Request    Caller is requesting a sooner appointment.  Caller declined first available appointment listed below.  Caller will not accept being placed on the waitlist and is requesting a message be sent to doctor.    Name of Caller: Patient  When is the first available appointment?  N/A    Would the patient rather a call back or a response via [facility]?   Call back  Best Call Back Number:  [phone]    Additional Information:   States he will be out of town for his appointment on 9/20 and would like to reschedule sooner - please call to schedule - thank you

## 2024-09-17 ENCOUNTER — TELEPHONE (OUTPATIENT)
Dept: FAMILY MEDICINE | Facility: CLINIC | Age: 79
End: 2024-09-17
Payer: MEDICARE

## 2024-09-17 NOTE — TELEPHONE ENCOUNTER
Called pt, and left vm to let him know that his prescription were approved and sent to the pharmacy that it was a short supply. That he needs to schedule appointment and fasting labs.

## 2024-10-08 ENCOUNTER — HOSPITAL ENCOUNTER (OUTPATIENT)
Dept: RADIOLOGY | Facility: HOSPITAL | Age: 79
Discharge: HOME | End: 2024-10-08
Attending: INTERNAL MEDICINE
Payer: MEDICARE

## 2024-10-08 DIAGNOSIS — R05.9 COUGH, UNSPECIFIED TYPE: ICD-10-CM

## 2024-10-08 PROCEDURE — 71046 X-RAY EXAM CHEST 2 VIEWS: CPT | Mod: 26,,, | Performed by: RADIOLOGY

## 2024-10-08 PROCEDURE — 71046 X-RAY EXAM CHEST 2 VIEWS: CPT | Mod: TC,FY,PO

## 2024-10-11 ENCOUNTER — TELEPHONE (OUTPATIENT)
Dept: UROLOGY | Facility: CLINIC | Age: 79
End: 2024-10-11
Payer: MEDICARE

## 2024-10-11 ENCOUNTER — OFFICE VISIT (OUTPATIENT)
Dept: UROLOGY | Facility: CLINIC | Age: 79
End: 2024-10-11
Payer: MEDICARE

## 2024-10-11 ENCOUNTER — INFUSION (OUTPATIENT)
Dept: INFUSION THERAPY | Facility: HOSPITAL | Age: 79
End: 2024-10-11
Attending: UROLOGY
Payer: MEDICARE

## 2024-10-11 VITALS — BODY MASS INDEX: 23.51 KG/M2 | HEIGHT: 74 IN | WEIGHT: 183.19 LBS

## 2024-10-11 VITALS
TEMPERATURE: 98 F | HEIGHT: 74 IN | HEART RATE: 71 BPM | OXYGEN SATURATION: 99 % | SYSTOLIC BLOOD PRESSURE: 132 MMHG | RESPIRATION RATE: 18 BRPM | BODY MASS INDEX: 23.51 KG/M2 | DIASTOLIC BLOOD PRESSURE: 63 MMHG | WEIGHT: 183.19 LBS

## 2024-10-11 DIAGNOSIS — R30.0 DYSURIA: ICD-10-CM

## 2024-10-11 DIAGNOSIS — C61 PROSTATE CANCER: Primary | ICD-10-CM

## 2024-10-11 LAB
BILIRUBIN, UA POC OHS: NEGATIVE
BLOOD, UA POC OHS: ABNORMAL
CLARITY, UA POC OHS: ABNORMAL
COLOR, UA POC OHS: YELLOW
GLUCOSE, UA POC OHS: NEGATIVE
KETONES, UA POC OHS: NEGATIVE
LEUKOCYTES, UA POC OHS: ABNORMAL
NITRITE, UA POC OHS: NEGATIVE
PH, UA POC OHS: 5.5
PROTEIN, UA POC OHS: 30
SPECIFIC GRAVITY, UA POC OHS: >=1.03
UROBILINOGEN, UA POC OHS: 0.2

## 2024-10-11 PROCEDURE — 87086 URINE CULTURE/COLONY COUNT: CPT | Performed by: UROLOGY

## 2024-10-11 PROCEDURE — 99999 PR PBB SHADOW E&M-EST. PATIENT-LVL II: CPT | Mod: PBBFAC,,, | Performed by: UROLOGY

## 2024-10-11 PROCEDURE — 96401 CHEMO ANTI-NEOPL SQ/IM: CPT | Mod: PN

## 2024-10-11 NOTE — PLAN OF CARE
Pt arrived to clinic today for Lupron injection and tolerated well. No changes throughout therapy. Pt aware of follow up appointments and side effects of drugs. Discharged to home. NAD.

## 2024-10-11 NOTE — PROGRESS NOTES
Subjective:       Patient ID: Dutch Olivier is a 79 y.o. male.    Chief Complaint: Dysuria and Prostate Cancer    HPI    79-year-old with a distant history of prostate cancer.  He underwent radical prostatectomy in 2000 by Dr. Landers.  His PSA had been undetectable however in 2015 we begin to see a slight increase in his PSA.  His PSA increased to 21.5.  Bone scan was obtained which did show 2 suspicious lesions.  He began androgen ablation.  He was given his 1st Eligard injection in February 2021.  He also began apalutamide in May 2021.  His most recent PSA is undetectable.  He discontinued apalutamide in mid January 2023 as it was too expensive and is now taking bicalutamide.  He is otherwise doing well.  For urinary tract infection 6 months ago.  He has some minor dysuria and concerned about a persistent infection.  Urine dipstick shows negative for nitrites, glucose, positive for 1+leukocytes, 1+blood, trace protein.         Component PSA Diagnostic   Latest Ref Rng & Units 0.00 - 4.00 ng/mL    <0.01   3/19/2024 <0.01   9/13/2023 <0.01   3/10/2023 <0.01   9/7/2022 <0.01   3/10/2022 <0.01   8/25/2021 <0.01   6/2/2021 0.06   2/5/2021 23.5 (H)   2/2/2021 21.5 (H)   7/7/2020 1.1   5/9/2019 0.07   1/16/2018 0.04   1/12/2016 0.02       Review of Systems   Constitutional:  Negative for fever.   Genitourinary:  Negative for dysuria and hematuria.       Objective:      Physical Exam  Vitals reviewed.   Constitutional:       Appearance: He is well-developed.   Pulmonary:      Effort: Pulmonary effort is normal.   Skin:     Findings: No rash.   Neurological:      Mental Status: He is alert and oriented to person, place, and time.         Assessment:       1. Prostate cancer    2. Dysuria        Plan:       Prostate cancer  -     POCT Urinalysis(Instrument)    Dysuria  -     Urine culture      Continue Lupron and bicalutamide.  Follow-up 6 months.    Visit today included increased complexity associated with the care  of prostate cancer and managing the longitudinal care of the patient due to the serious and/or complex managed problem(s) of prostate cancer.

## 2024-10-12 LAB — BACTERIA UR CULT: ABNORMAL

## 2024-10-14 RX ORDER — NITROFURANTOIN 25; 75 MG/1; MG/1
100 CAPSULE ORAL 2 TIMES DAILY
Qty: 28 CAPSULE | Refills: 0 | Status: SHIPPED | OUTPATIENT
Start: 2024-10-14

## 2024-10-17 ENCOUNTER — OFFICE VISIT (OUTPATIENT)
Dept: FAMILY MEDICINE | Facility: CLINIC | Age: 79
End: 2024-10-17
Payer: MEDICARE

## 2024-10-17 VITALS
HEIGHT: 74 IN | RESPIRATION RATE: 18 BRPM | SYSTOLIC BLOOD PRESSURE: 115 MMHG | WEIGHT: 183.75 LBS | BODY MASS INDEX: 23.58 KG/M2 | HEART RATE: 63 BPM | OXYGEN SATURATION: 97 % | DIASTOLIC BLOOD PRESSURE: 78 MMHG

## 2024-10-17 DIAGNOSIS — R73.02 GLUCOSE INTOLERANCE (IMPAIRED GLUCOSE TOLERANCE): ICD-10-CM

## 2024-10-17 DIAGNOSIS — Z12.11 SCREENING FOR COLON CANCER: ICD-10-CM

## 2024-10-17 DIAGNOSIS — D64.9 ANEMIA, UNSPECIFIED TYPE: ICD-10-CM

## 2024-10-17 DIAGNOSIS — I10 ESSENTIAL HYPERTENSION: ICD-10-CM

## 2024-10-17 DIAGNOSIS — E78.2 MIXED HYPERLIPIDEMIA: ICD-10-CM

## 2024-10-17 DIAGNOSIS — C61 PROSTATE CANCER METASTATIC TO BONE: ICD-10-CM

## 2024-10-17 DIAGNOSIS — C79.51 PROSTATE CANCER METASTATIC TO BONE: ICD-10-CM

## 2024-10-17 DIAGNOSIS — J44.9 CHRONIC OBSTRUCTIVE PULMONARY DISEASE, UNSPECIFIED COPD TYPE: ICD-10-CM

## 2024-10-17 DIAGNOSIS — Z00.00 MEDICARE ANNUAL WELLNESS VISIT, SUBSEQUENT: Primary | ICD-10-CM

## 2024-10-17 DIAGNOSIS — F10.10 ALCOHOL ABUSE, DAILY USE: ICD-10-CM

## 2024-10-17 DIAGNOSIS — I35.0 AORTIC VALVE STENOSIS, MODERATE: ICD-10-CM

## 2024-10-17 PROCEDURE — 99999 PR PBB SHADOW E&M-EST. PATIENT-LVL III: CPT | Mod: PBBFAC,,, | Performed by: INTERNAL MEDICINE

## 2024-10-17 RX ORDER — BENAZEPRIL HYDROCHLORIDE 20 MG/1
20 TABLET ORAL 2 TIMES DAILY
Qty: 180 TABLET | Refills: 2 | Status: SHIPPED | OUTPATIENT
Start: 2024-10-17

## 2024-10-17 RX ORDER — PRAVASTATIN SODIUM 40 MG/1
40 TABLET ORAL DAILY
Qty: 90 TABLET | Refills: 2 | Status: SHIPPED | OUTPATIENT
Start: 2024-10-17

## 2024-10-17 RX ORDER — HYDROCHLOROTHIAZIDE 12.5 MG/1
12.5 TABLET ORAL EVERY MORNING
Qty: 90 TABLET | Refills: 2 | Status: SHIPPED | OUTPATIENT
Start: 2024-10-17 | End: 2025-10-17

## 2024-10-17 NOTE — PROGRESS NOTES
The following components were reviewed and updated:  Medical history  Family History  Social history  Allergies and Current Medications  Health Risk Assessment  Health Maintenance  Care Team    HRA: patient feels overall is healthy.  Psychosocial and behavioral risks discussed.  BMI - 23  Weight loss discussed.   Diet - well balanced.   ADL: self sufficient in all  Instrumental ADL: patient is able to manage things like their medications and finances.    Memory or cognitive function - Patient has no issues with either   Ambulates normal. No recent falls.  Exercise - some walking   Depression screening is negative.  Hearing--no deficits.  Vision - glasses  Incontinence - none  Opiate Screen- Negative     Preventative health needs discussed and patient was given a printed list of what they have received and what they will need with in the next 5-10 years.  Recommendations developed using the USPSTF age appropriate recommendations.  Education, counseling, and referrals were provided as needed.     After Visit Summary printed and given to patient which includes a list of additional screenings\tests needed.     Advanced Care directive: no,  I recommend living will & POA   Advanced care planning, including how to pick a person who would make decisions for you if you were unable to make them for yourself, called a health care power of , and what kind of decisions you might make such as use of life sustaining treatments such as ventilators and tube feeding when faced with a life limiting illness recorded on a living will that they will need to know. (How you want to be cared for as you near the end of your natural life    I have reviewed and updated the patient's current list of providers.     In addition to the patient's preventative review and discussion today, the patient also has other issues to discuss today with a separate summary of plan below:     Subjective:       Patient ID: Dutch HINSON Soy is a 79 y.o.  male.    Chief Complaint: Medicare AWV   HPI         PROSTATE CANCER:  He reports low PSA levels as expected. He receives Lupron injections every six months and takes oral Casodex  for prostate cancer treatment. He denies any issues with his current treatment regimen.    URINARY TRACT INFECTION:  He reports a persistent urinary tract infection for approximately one year. Previous antibiotic treatments provided temporary relief but did not fully resolve the infection. Current symptoms include frequent urination and difficulty emptying his bladder fully, often with minimal output.  Will start today nitrofurantoin Rx by Urology     CARDIOVASCULAR HEALTH:  His LDL cholesterol is 96. Triglycerides 219 were discussed, with dietary factors such as high fructose corn syrup and excessive consumption of certain foods like cheese or nuts potentially contributing to elevated levels.    ANEMIA:  He is mildly anemic on recent labs, possibly due to reduced meat consumption. He takes a daily multivitamin for men over 50, which likely does not contain iron. He denies noticing blood in his urine, but recent urine screens have shown small to moderate amounts of blood, potentially contributing to chronic, slow-developing anemia.  Colonoscopy years denies blood in stool    LIFESTYLE AND DIET:  He reports a very sedentary lifestyle, spending significant time reading and sitting. He maintains a semi-retired work schedule, driving to his office approximately once a week. He has reduced alcohol consumption, having stopped drinking wine approximately 1.5 months ago to improve health and productivity.  Was drinking up to 1 L daily for years of wine.      FAMILY HISTORY:  He reports a significant family history of alcohol abuse, including his father and brother. His brother has a severe alcohol use disorder.    HEARING:  He reports intermittent pulsatile tinnitus in the right ear without specific positional triggers. He believes he may be  ready for hearing aids but has not had a hearing aid evaluation previously.        PSA: < 0.01 (10/24   // + Prostate cancer w mets //mgmt urology Q 6 M Rx Lupron and oral (Casodex) bicalutamide    changed due to $$ Erleada   Colonoscopy:  yes in past approx 3  Dr [facility]   Immunizations: Flu: Y Tdap: rx pharm Prevnar 20: 2023 Shingles: Y   Smoker:  Yes cessation encouraged// doesn't plan on quitting like xray yrly completed no pulmonary nodules  [facility]:  [facility]  Derm: Dr Myles Q 6 M    Hx Melanoma right ear:  Excision w chemo stopped early SE. Mgmt  Oncology Dr. Gongora  Prev PCP Dr Corey Rand   Alcholol excessive daily use:  Stops 1.5 months.  Doing well not have any DT issue.  Was drinking 1 L of wine daily for years.       Hypertension:  controlled Rx Benazepril 20 bid, Hctz 12.5      Mixed HLD:   Controlled LDL Rx Pravastatin 40    Tg 211, 137 LDL      Atherosclerosis:  CT abdominal aorta      Heart murmur:  2022 Echo mod AS, mild MR. Ef wnl. Pulm HTN     Mgmt cardio Dr STORM      Glucose intolerance:  Fasting glucose elevated.  G 109  // a1c 5.1 .       Anemia/macrocytosis:  Lower Hb 12    Mild elevated MCV.   Does drink 3-4 glasses of wine daily        Weight loss: improved since last visit prev 181-183        ____________________________________________________________________________________________________  Assessment & Plan:  1. Medicare annual wellness visit, subsequent    2. Anemia, unspecified type  - Occult blood x 1, stool; Future  - Iron and TIBC; Future    3. Mixed hyperlipidemia  - pravastatin (PRAVACHOL) 40 MG tablet; Take 1 tablet (40 mg total) by mouth once daily.  Dispense: 90 tablet; Refill: 2    4. Glucose intolerance (impaired glucose tolerance)    5. Essential hypertension  - benazepriL (LOTENSIN) 20 MG tablet; Take 1 tablet (20 mg total) by mouth 2 (two) times daily.  Dispense: 180 tablet; Refill: 2  - hydroCHLOROthiazide (HYDRODIURIL) 12.5 MG Tab; Take 1 tablet (12.5 mg total) by  mouth every morning.  Dispense: 90 tablet; Refill: 2    6. Aortic valve stenosis, moderate    7. Chronic obstructive pulmonary disease, unspecified COPD type    8. Prostate cancer metastatic to bone    9. Screening for colon cancer  - Occult blood x 1, stool; Future    10. Alcohol abuse, daily use     Medicare annual wellness visit, subsequent    Anemia, unspecified type  Comments:  Check iron panel, FOBT  Orders:  -     Occult blood x 1, stool; Future; Expected date: 10/17/2024  -     Iron and TIBC; Future; Expected date: 10/17/2024    Mixed hyperlipidemia  -     pravastatin (PRAVACHOL) 40 MG tablet; Take 1 tablet (40 mg total) by mouth once daily.  Dispense: 90 tablet; Refill: 2    Glucose intolerance (impaired glucose tolerance)    Essential hypertension  -     benazepriL (LOTENSIN) 20 MG tablet; Take 1 tablet (20 mg total) by mouth 2 (two) times daily.  Dispense: 180 tablet; Refill: 2  -     hydroCHLOROthiazide (HYDRODIURIL) 12.5 MG Tab; Take 1 tablet (12.5 mg total) by mouth every morning.  Dispense: 90 tablet; Refill: 2    Aortic valve stenosis, moderate    Chronic obstructive pulmonary disease, unspecified COPD type    Prostate cancer metastatic to bone    Screening for colon cancer  -     Occult blood x 1, stool; Future; Expected date: 10/17/2024    Alcohol abuse, daily use  Comments:  Has recently stopped        Continue to work on maintain healthy weight, balanced diet. Avoid unhealthy habits: smoking/vaping, excessive alcohol intake.     Recommend diet exercise:  High protein, low fat, low carb diet - calories women under <1200 & men <1800, carbs <100 G, protein 50-60 G daily. Protein supplements to replace one meal.  Keep all beverages < 10 calories per serving. Keep snacks < 100 calories.   Recommend exercise 160 minutes per week, combo of cardio and weight/strength training.     Disclaimer: This note was partly generated using dictation software which may occasionally result in transcription  errors  ____________________________________________________________________________________________________  Review of Systems:  Review of Systems      Negative     Objective:     Wt Readings from Last 3 Encounters:   10/17/24 83.3 kg (183 lb 12.1 oz)   10/11/24 83.1 kg (183 lb 3.2 oz)   10/11/24 83.1 kg (183 lb 3.2 oz)     BP Readings from Last 3 Encounters:   10/17/24 115/78   10/11/24 132/63   03/20/24 123/63       Lab Results   Component Value Date    WBC 9.01 10/08/2024    HGB 12.0 (L) 10/08/2024    HCT 37.8 (L) 10/08/2024     10/08/2024     10/08/2024    K 4.0 10/08/2024     10/08/2024    ALT 15 10/08/2024    AST 16 10/08/2024    CO2 26 10/08/2024    CREATININE 0.9 10/08/2024    BUN 17 10/08/2024    PSA <0.010 09/12/2012     10/08/2024      Hemoglobin A1C   Date Value Ref Range Status   10/08/2024 5.1 4.0 - 5.6 % Final     Comment:     ADA Screening Guidelines:  5.7-6.4%  Consistent with prediabetes  >or=6.5%  Consistent with diabetes    High levels of fetal hemoglobin interfere with the HbA1C  assay. Heterozygous hemoglobin variants (HbS, HgC, etc)do  not significantly interfere with this assay.   However, presence of multiple variants may affect accuracy.     08/09/2022 4.9 4.0 - 5.6 % Final     Comment:     ADA Screening Guidelines:  5.7-6.4%  Consistent with prediabetes  >or=6.5%  Consistent with diabetes    High levels of fetal hemoglobin interfere with the HbA1C  assay. Heterozygous hemoglobin variants (HbS, HgC, etc)do  not significantly interfere with this assay.   However, presence of multiple variants may affect accuracy.     10/20/2006 5.3 4.5 - 6.2 % Final      Lab Results   Component Value Date    TSH 0.721 09/13/2023    TSH 1.005 08/09/2022    TSH 1.36 12/17/2010     Lab Results   Component Value Date    FREET4 1.13 12/17/2010     Lab Results   Component Value Date    LDLCALC 96.2 10/08/2024    LDLCALC 94.2 09/13/2023    LDLCALC 105.8 08/09/2022     Lab Results    Component Value Date    TRIG 219 (H) 10/08/2024    TRIG 199 (H) 09/13/2023    TRIG 211 (H) 08/09/2022            Physical Exam  Constitutional:       Appearance: Normal appearance.   HENT:      Head: Normocephalic and atraumatic.   Eyes:      Extraocular Movements: Extraocular movements intact.      Conjunctiva/sclera: Conjunctivae normal.      Pupils: Pupils are equal, round, and reactive to light.   Cardiovascular:      Rate and Rhythm: Normal rate and regular rhythm.      Heart sounds: Murmur heard.   Pulmonary:      Effort: Pulmonary effort is normal.      Breath sounds: Normal breath sounds.   Abdominal:      General: Bowel sounds are normal.   Musculoskeletal:      Right lower leg: No edema.      Left lower leg: No edema.   Neurological:      Mental Status: He is alert and oriented to person, place, and time.   Psychiatric:         Mood and Affect: Mood normal.             Medication List with Changes/Refills   Current Medications    B COMPLEX VITAMINS TABLET    Take 1 tablet by mouth once daily.    BICALUTAMIDE (CASODEX) 50 MG TAB    TAKE 1 TABLET (50 MG TOTAL) BY MOUTH ONCE DAILY.    MULTIVITAMIN CAPSULE    Take 1 capsule by mouth once daily.    NITROFURANTOIN, MACROCRYSTAL-MONOHYDRATE, (MACROBID) 100 MG CAPSULE    Take 1 capsule (100 mg total) by mouth 2 (two) times daily.   Changed and/or Refilled Medications    Modified Medication Previous Medication    BENAZEPRIL (LOTENSIN) 20 MG TABLET benazepriL (LOTENSIN) 20 MG tablet       Take 1 tablet (20 mg total) by mouth 2 (two) times daily.    Take 1 tablet (20 mg total) by mouth 2 (two) times daily.    HYDROCHLOROTHIAZIDE (HYDRODIURIL) 12.5 MG TAB hydroCHLOROthiazide (HYDRODIURIL) 12.5 MG Tab       Take 1 tablet (12.5 mg total) by mouth every morning.    Take 1 tablet (12.5 mg total) by mouth every morning. Blood pressure DUE PCP VISIT    PRAVASTATIN (PRAVACHOL) 40 MG TABLET pravastatin (PRAVACHOL) 40 MG tablet       Take 1 tablet (40 mg total) by mouth once  daily.    Take 1 tablet (40 mg total) by mouth once daily. DUE PCP VISIT

## 2024-10-17 NOTE — PATIENT INSTRUCTIONS
Counseling and Referral of Other Preventative  (Italic type indicates deductible and co-insurance are waived)    No orders of the defined types were placed in this encounter.     The following information is provided to all patients.  This information is to help you find resources for any of the problems found today that may be affecting your health:                Living healthy guide: www.[facility].[place].[facility]       Understanding Diabetes: www.diabetes.org       Eating healthy: www.cdc.gov/healthyweight      CDC home safety checklist: www.cdc.gov/steadi/patient.html      Agency on Aging: www.goea.[place].[facility]       Alcoholics anonymous (AA): www.aa.org      Physical Activity: www.cat.nih.gov/hs4beqb       Tobacco use: www.quitwithusla.org

## 2024-10-18 ENCOUNTER — LAB VISIT (OUTPATIENT)
Dept: LAB | Facility: HOSPITAL | Age: 79
End: 2024-10-18
Attending: INTERNAL MEDICINE
Payer: MEDICARE

## 2024-10-18 DIAGNOSIS — D64.9 ANEMIA, UNSPECIFIED TYPE: ICD-10-CM

## 2024-10-18 LAB
IRON SERPL-MCNC: 60 UG/DL (ref 45–160)
SATURATED IRON: 18 % (ref 20–50)
TOTAL IRON BINDING CAPACITY: 337 UG/DL (ref 250–450)
TRANSFERRIN SERPL-MCNC: 228 MG/DL (ref 200–375)

## 2024-10-18 PROCEDURE — 36415 COLL VENOUS BLD VENIPUNCTURE: CPT | Mod: PN | Performed by: INTERNAL MEDICINE

## 2024-10-18 PROCEDURE — 84466 ASSAY OF TRANSFERRIN: CPT | Performed by: INTERNAL MEDICINE

## 2024-10-22 DIAGNOSIS — E61.1 IRON DEFICIENCY: Primary | ICD-10-CM

## 2024-10-29 ENCOUNTER — HOSPITAL ENCOUNTER (OUTPATIENT)
Dept: CARDIOLOGY | Facility: HOSPITAL | Age: 79
Discharge: HOME | End: 2024-10-29
Attending: INTERNAL MEDICINE
Payer: MEDICARE

## 2024-10-29 VITALS — HEIGHT: 74 IN | WEIGHT: 183 LBS | BODY MASS INDEX: 23.49 KG/M2

## 2024-10-29 DIAGNOSIS — I35.0 AORTIC VALVE STENOSIS, MODERATE: ICD-10-CM

## 2024-10-29 PROCEDURE — 93306 TTE W/DOPPLER COMPLETE: CPT | Mod: 26,,, | Performed by: INTERNAL MEDICINE

## 2024-10-29 PROCEDURE — 93306 TTE W/DOPPLER COMPLETE: CPT | Mod: PO

## 2024-10-30 LAB
ASCENDING AORTA: 3.27 CM
AV INDEX (PROSTH): 0.2
AV MEAN GRADIENT: 34.1 MMHG
AV PEAK GRADIENT: 60.8 MMHG
AV REGURGITATION PRESSURE HALF TIME: 730.21 MS
AV VALVE AREA BY VELOCITY RATIO: 1.1 CM²
AV VALVE AREA: 1 CM²
AV VELOCITY RATIO: 0.21
BSA FOR ECHO PROCEDURE: 2.08 M2
CV ECHO LV RWT: 0.34 CM
DOP CALC AO PEAK VEL: 3.9 M/S
DOP CALC AO VTI: 100.3 CM
DOP CALC LVOT AREA: 5.3 CM2
DOP CALC LVOT DIAMETER: 2.6 CM
DOP CALC LVOT PEAK VEL: 0.8 M/S
DOP CALC LVOT STROKE VOLUME: 104.5 CM3
DOP CALCLVOT PEAK VEL VTI: 19.7 CM
E WAVE DECELERATION TIME: 193.15 MSEC
E/A RATIO: 0.81
E/E' RATIO: 8.38 M/S
ECHO LV POSTERIOR WALL: 1 CM (ref 0.6–1.1)
FRACTIONAL SHORTENING: 27.6 % (ref 28–44)
INTERVENTRICULAR SEPTUM: 1.1 CM (ref 0.6–1.1)
IVRT: 114.18 MSEC
LEFT ATRIUM AREA SYSTOLIC (APICAL 2 CHAMBER): 26.62 CM2
LEFT ATRIUM AREA SYSTOLIC (APICAL 4 CHAMBER): 18.17 CM2
LEFT ATRIUM SIZE: 3.7 CM
LEFT ATRIUM VOLUME INDEX MOD: 34 ML/M2
LEFT ATRIUM VOLUME MOD: 71.04 ML
LEFT INTERNAL DIMENSION IN SYSTOLE: 4.2 CM (ref 2.1–4)
LEFT VENTRICLE DIASTOLIC VOLUME INDEX: 78.65 ML/M2
LEFT VENTRICLE DIASTOLIC VOLUME: 164.38 ML
LEFT VENTRICLE END SYSTOLIC VOLUME APICAL 2 CHAMBER: 94.03 ML
LEFT VENTRICLE END SYSTOLIC VOLUME APICAL 4 CHAMBER: 45.75 ML
LEFT VENTRICLE MASS INDEX: 118.9 G/M2
LEFT VENTRICLE SYSTOLIC VOLUME INDEX: 36.9 ML/M2
LEFT VENTRICLE SYSTOLIC VOLUME: 77.21 ML
LEFT VENTRICULAR INTERNAL DIMENSION IN DIASTOLE: 5.8 CM (ref 3.5–6)
LEFT VENTRICULAR MASS: 248.5 G
LV LATERAL E/E' RATIO: 7.44 M/S
LV SEPTAL E/E' RATIO: 9.57 M/S
LVED V (TEICH): 164.38 ML
LVES V (TEICH): 77.21 ML
LVOT MG: 1.1 MMHG
LVOT MV: 0.49 CM/S
MV PEAK A VEL: 0.83 M/S
MV PEAK E VEL: 0.67 M/S
MV STENOSIS PRESSURE HALF TIME: 56.01 MS
MV VALVE AREA P 1/2 METHOD: 3.93 CM2
PISA AR MAX VEL: 3.92 M/S
PISA TR MAX VEL: 2.83 M/S
PULM VEIN S/D RATIO: 1.4
PV PEAK D VEL: 0.47 M/S
PV PEAK S VEL: 0.66 M/S
RA PRESSURE ESTIMATED: 3 MMHG
RIGHT VENTRICLE DIASTOLIC LENGTH: 6.9 CM
RIGHT VENTRICLE DIASTOLIC MID DIMENSION: 2.8 CM
RIGHT VENTRICULAR END-DIASTOLIC DIMENSION: 4.86 CM
RIGHT VENTRICULAR LENGTH IN DIASTOLE (APICAL 4-CHAMBER VIEW): 6.85 CM
RV MID DIAMA: 2.82 CM
RV TB RVSP: 6 MMHG
RV TISSUE DOPPLER FREE WALL SYSTOLIC VELOCITY 1 (APICAL 4 CHAMBER VIEW): 13.78 CM/S
SINUS: 3.33 CM
STJ: 2.67 CM
TDI LATERAL: 0.09 M/S
TDI SEPTAL: 0.07 M/S
TDI: 0.08 M/S
TR MAX PG: 32 MMHG
TRICUSPID ANNULAR PLANE SYSTOLIC EXCURSION: 2.34 CM
TV REST PULMONARY ARTERY PRESSURE: 35 MMHG
Z-SCORE OF LEFT VENTRICULAR DIMENSION IN END DIASTOLE: -1.06
Z-SCORE OF LEFT VENTRICULAR DIMENSION IN END SYSTOLE: 0.5

## 2024-11-01 ENCOUNTER — OFFICE VISIT (OUTPATIENT)
Dept: CARDIOLOGY | Facility: CLINIC | Age: 79
End: 2024-11-01
Payer: MEDICARE

## 2024-11-01 VITALS
DIASTOLIC BLOOD PRESSURE: 72 MMHG | HEART RATE: 72 BPM | SYSTOLIC BLOOD PRESSURE: 127 MMHG | HEIGHT: 74 IN | BODY MASS INDEX: 23.82 KG/M2 | WEIGHT: 185.63 LBS

## 2024-11-01 DIAGNOSIS — I34.0 MILD MITRAL REGURGITATION: ICD-10-CM

## 2024-11-01 DIAGNOSIS — I35.0 AORTIC VALVE STENOSIS, MODERATE: Primary | ICD-10-CM

## 2024-11-01 DIAGNOSIS — I70.8 AORTO-ILIAC ATHEROSCLEROSIS: ICD-10-CM

## 2024-11-01 DIAGNOSIS — I10 ESSENTIAL HYPERTENSION: ICD-10-CM

## 2024-11-01 DIAGNOSIS — I27.20 PULMONARY HYPERTENSION: ICD-10-CM

## 2024-11-01 DIAGNOSIS — F17.200 TOBACCO USE DISORDER: ICD-10-CM

## 2024-11-01 DIAGNOSIS — E78.2 MIXED HYPERLIPIDEMIA: ICD-10-CM

## 2024-11-01 DIAGNOSIS — I70.0 AORTO-ILIAC ATHEROSCLEROSIS: ICD-10-CM

## 2024-11-01 DIAGNOSIS — Z86.002 HISTORY OF CARCINOMA IN SITU OF PROSTATE: ICD-10-CM

## 2024-11-01 PROCEDURE — 99999 PR PBB SHADOW E&M-EST. PATIENT-LVL III: CPT | Mod: PBBFAC,,, | Performed by: INTERNAL MEDICINE

## 2024-12-30 RX ORDER — BICALUTAMIDE 50 MG/1
50 TABLET, FILM COATED ORAL DAILY
Qty: 90 TABLET | Refills: 3 | Status: SHIPPED | OUTPATIENT
Start: 2024-12-30 | End: 2025-12-30

## 2025-03-03 ENCOUNTER — TELEPHONE (OUTPATIENT)
Dept: UROLOGY | Facility: CLINIC | Age: 80
End: 2025-03-03
Payer: MEDICARE

## 2025-03-03 DIAGNOSIS — R30.0 DYSURIA: Primary | ICD-10-CM

## 2025-03-03 NOTE — TELEPHONE ENCOUNTER
----- Message from Martha sent at 3/3/2025  9:47 AM CST -----  Regarding: Call back  Type:  Needs Medical AdviceWho Called: PtWould the patient rather a call back or a response via [facility]? CallBest Call Back Number: [phone] Information: Pt is requesting a call back about blood in urine. Thanks

## 2025-03-05 ENCOUNTER — LAB VISIT (OUTPATIENT)
Dept: LAB | Facility: HOSPITAL | Age: 80
End: 2025-03-05
Attending: UROLOGY
Payer: MEDICARE

## 2025-03-05 DIAGNOSIS — R30.0 DYSURIA: ICD-10-CM

## 2025-03-05 LAB
BACTERIA #/AREA URNS AUTO: ABNORMAL /HPF
BILIRUB UR QL STRIP: NEGATIVE
CLARITY UR REFRACT.AUTO: ABNORMAL
COLOR UR AUTO: YELLOW
GLUCOSE UR QL STRIP: NEGATIVE
HGB UR QL STRIP: NEGATIVE
KETONES UR QL STRIP: NEGATIVE
LEUKOCYTE ESTERASE UR QL STRIP: ABNORMAL
MICROSCOPIC COMMENT: ABNORMAL
NITRITE UR QL STRIP: NEGATIVE
PH UR STRIP: 7 [PH] (ref 5–8)
PROT UR QL STRIP: ABNORMAL
RBC #/AREA URNS AUTO: 19 /HPF (ref 0–4)
SP GR UR STRIP: 1.02 (ref 1–1.03)
SQUAMOUS #/AREA URNS AUTO: 1 /HPF
UNSPECIFIED CRY UR QL COMP ASSIST: 3
URN SPEC COLLECT METH UR: ABNORMAL
WBC #/AREA URNS AUTO: 98 /HPF (ref 0–5)
WBC CLUMPS UR QL AUTO: ABNORMAL

## 2025-03-05 PROCEDURE — 87186 SC STD MICRODIL/AGAR DIL: CPT | Performed by: UROLOGY

## 2025-03-05 PROCEDURE — 87086 URINE CULTURE/COLONY COUNT: CPT | Performed by: UROLOGY

## 2025-03-05 PROCEDURE — 87088 URINE BACTERIA CULTURE: CPT | Performed by: UROLOGY

## 2025-03-05 PROCEDURE — 81001 URINALYSIS AUTO W/SCOPE: CPT | Performed by: UROLOGY

## 2025-03-05 PROCEDURE — 87077 CULTURE AEROBIC IDENTIFY: CPT | Performed by: UROLOGY

## 2025-03-06 ENCOUNTER — TELEPHONE (OUTPATIENT)
Dept: UROLOGY | Facility: CLINIC | Age: 80
End: 2025-03-06
Payer: MEDICARE

## 2025-03-06 DIAGNOSIS — R30.0 DYSURIA: Primary | ICD-10-CM

## 2025-03-06 NOTE — TELEPHONE ENCOUNTER
----- Message from EZ Kwan MD sent at 3/5/2025  5:13 PM CST -----  Regarding: RE: Call back  Get urine for culture and okay to schedule tentative cystoscopy in a couple of weeks.  ----- Message -----  From: Lena Olivier MA  Sent: 3/3/2025  10:31 AM CST  To: EZ Kwan MD  Subject: FW: Call back                                    Pt states he has been having ongoing dysuria at the end of urination and now is seeing hematuria. I did place orders for a culture, but pt states he think it is time for a scope any recommendations?  ----- Message -----  From: Martha Panda  Sent: 3/3/2025   9:50 AM CST  To: EZ Kwan Staff [facility]  Subject: Call back                                        Type:  Needs Medical AdviceWho Called: PtWould the patient rather a call back or a response via [facility]? CallBest Call Back Number: [phone] Information: Pt is requesting a call back about blood in urine. Thanks

## 2025-03-08 LAB — BACTERIA UR CULT: ABNORMAL

## 2025-03-10 ENCOUNTER — RESULTS FOLLOW-UP (OUTPATIENT)
Dept: UROLOGY | Facility: CLINIC | Age: 80
End: 2025-03-10

## 2025-03-10 RX ORDER — AMOXICILLIN AND CLAVULANATE POTASSIUM 875; 125 MG/1; MG/1
1 TABLET, FILM COATED ORAL 2 TIMES DAILY
Qty: 14 TABLET | Refills: 0 | Status: SHIPPED | OUTPATIENT
Start: 2025-03-10 | End: 2025-03-20 | Stop reason: CLARIF

## 2025-03-13 ENCOUNTER — PROCEDURE VISIT (OUTPATIENT)
Dept: UROLOGY | Facility: CLINIC | Age: 80
End: 2025-03-13
Payer: MEDICARE

## 2025-03-13 VITALS — WEIGHT: 184.5 LBS | HEIGHT: 74 IN | BODY MASS INDEX: 23.68 KG/M2

## 2025-03-13 DIAGNOSIS — R30.0 DYSURIA: ICD-10-CM

## 2025-03-13 DIAGNOSIS — Z12.5 PROSTATE CANCER SCREENING: ICD-10-CM

## 2025-03-13 DIAGNOSIS — R31.0 HEMATURIA, GROSS: Primary | ICD-10-CM

## 2025-03-13 PROCEDURE — 52000 CYSTOURETHROSCOPY: CPT | Mod: S$GLB,,, | Performed by: UROLOGY

## 2025-03-13 NOTE — PROCEDURES
Cystoscopy    Date/Time: 3/13/2025 11:00 AM    Performed by: EZ Kwan MD  Authorized by: EZ Kwan MD    Consent Done?:  Yes (Written)  Timeout: prior to procedure the correct patient, procedure, and site was verified    Prep: patient was prepped and draped in usual sterile fashion    Anesthesia:  Lidocaine jelly  Indications: dysuria and hematuria    Position:  Supine  Anesthesia:  Lidocaine jelly  Patient sedated?: No    Preparation: Patient was prepped and draped in usual sterile fashion    Scope type:  Flexible cystoscope   patient tolerated the procedure well with no immediate complications    Blood Loss:  None    79-year-old with a distant history of prostate cancer and radical prostatectomy 20 years ago.  He has been complaining of hematuria and dysuria.  He is scheduled for cystoscopy.    The flexible cystoscope was placed into the urethra and carefully advanced into the bladder.  At the bladder neck there was a large calcification.  There appeared to be an exposed suture likely from his urethral anastomosis.  The bladder neck was otherwise wide open.  The cystoscope was then removed and I examined the entire length of the urethra.   The anterior urethra appeared normal.  He tolerated the procedure well.  There were no complications    Impression:  Large bladder stone likely formed on an exposed anastomotic suture.    Plan: Cystolitholapaxy with excision of the suture material

## 2025-03-17 ENCOUNTER — TELEPHONE (OUTPATIENT)
Dept: UROLOGY | Facility: CLINIC | Age: 80
End: 2025-03-17
Payer: MEDICARE

## 2025-03-18 ENCOUNTER — LAB VISIT (OUTPATIENT)
Dept: LAB | Facility: HOSPITAL | Age: 80
End: 2025-03-18
Attending: UROLOGY
Payer: MEDICARE

## 2025-03-18 DIAGNOSIS — Z12.5 PROSTATE CANCER SCREENING: ICD-10-CM

## 2025-03-18 DIAGNOSIS — R31.0 HEMATURIA, GROSS: ICD-10-CM

## 2025-03-18 PROCEDURE — 36415 COLL VENOUS BLD VENIPUNCTURE: CPT | Mod: PN | Performed by: UROLOGY

## 2025-03-18 PROCEDURE — 84153 ASSAY OF PSA TOTAL: CPT | Performed by: UROLOGY

## 2025-03-19 LAB — COMPLEXED PSA SERPL-MCNC: <0.01 NG/ML (ref 0–4)

## 2025-03-25 PROBLEM — N21.0 BLADDER STONE: Status: ACTIVE | Noted: 2025-03-25

## 2025-03-26 ENCOUNTER — TELEPHONE (OUTPATIENT)
Dept: UROLOGY | Facility: CLINIC | Age: 80
End: 2025-03-26
Payer: MEDICARE

## 2025-03-26 NOTE — TELEPHONE ENCOUNTER
----- Message from Senia sent at 3/26/2025  8:53 AM CDT -----  Type:  Needs Medical Advice Who Called:Pt  Symptoms (please be specific): NA How long has patient had these symptoms:  NA Pharmacy name and phone #:  NA Would the patient rather a call back or a response via [facility]? Call Back Best Call Back Number: [phone] Additional Information: Pt calling for an appt to get  catheter removed he says Dr. Kwan instructed him to come in on Friday       Please call Back to advise. Thanks!

## 2025-03-28 ENCOUNTER — CLINICAL SUPPORT (OUTPATIENT)
Dept: UROLOGY | Facility: CLINIC | Age: 80
End: 2025-03-28
Payer: MEDICARE

## 2025-03-28 DIAGNOSIS — R33.9 URINARY RETENTION: Primary | ICD-10-CM

## 2025-03-28 PROCEDURE — 99999 PR PBB SHADOW E&M-EST. PATIENT-LVL II: CPT | Mod: PBBFAC,,,

## 2025-03-28 NOTE — PROGRESS NOTES
Pt came in for voiding trial. Withdrew  9 Cc from balloon and removed 18 Fr anna catheter intact. Provided voiding trial instruction. Pt expressed understanding and tolerated well.

## 2025-04-14 ENCOUNTER — OFFICE VISIT (OUTPATIENT)
Dept: UROLOGY | Facility: CLINIC | Age: 80
End: 2025-04-14
Payer: MEDICARE

## 2025-04-14 ENCOUNTER — INFUSION (OUTPATIENT)
Dept: INFUSION THERAPY | Facility: HOSPITAL | Age: 80
End: 2025-04-14
Attending: UROLOGY
Payer: MEDICARE

## 2025-04-14 VITALS — BODY MASS INDEX: 23.62 KG/M2 | WEIGHT: 184.06 LBS | HEIGHT: 74 IN

## 2025-04-14 VITALS
WEIGHT: 184.06 LBS | OXYGEN SATURATION: 99 % | DIASTOLIC BLOOD PRESSURE: 73 MMHG | SYSTOLIC BLOOD PRESSURE: 124 MMHG | TEMPERATURE: 98 F | RESPIRATION RATE: 16 BRPM | BODY MASS INDEX: 23.64 KG/M2 | HEART RATE: 62 BPM

## 2025-04-14 DIAGNOSIS — N21.0 BLADDER STONE: ICD-10-CM

## 2025-04-14 DIAGNOSIS — C61 PROSTATE CANCER METASTATIC TO BONE: Primary | ICD-10-CM

## 2025-04-14 DIAGNOSIS — C79.51 PROSTATE CANCER METASTATIC TO BONE: Primary | ICD-10-CM

## 2025-04-14 DIAGNOSIS — C61 PROSTATE CANCER: Primary | ICD-10-CM

## 2025-04-14 PROCEDURE — 1101F PT FALLS ASSESS-DOCD LE1/YR: CPT | Mod: CPTII,S$GLB,, | Performed by: UROLOGY

## 2025-04-14 PROCEDURE — 3288F FALL RISK ASSESSMENT DOCD: CPT | Mod: CPTII,S$GLB,, | Performed by: UROLOGY

## 2025-04-14 PROCEDURE — 63600175 PHARM REV CODE 636 W HCPCS: Mod: JZ,TB,PN | Performed by: UROLOGY

## 2025-04-14 PROCEDURE — G2211 COMPLEX E/M VISIT ADD ON: HCPCS | Mod: S$GLB,,, | Performed by: UROLOGY

## 2025-04-14 PROCEDURE — 1126F AMNT PAIN NOTED NONE PRSNT: CPT | Mod: CPTII,S$GLB,, | Performed by: UROLOGY

## 2025-04-14 PROCEDURE — 99214 OFFICE O/P EST MOD 30 MIN: CPT | Mod: S$GLB,,, | Performed by: UROLOGY

## 2025-04-14 PROCEDURE — 99999 PR PBB SHADOW E&M-EST. PATIENT-LVL II: CPT | Mod: PBBFAC,,, | Performed by: UROLOGY

## 2025-04-14 PROCEDURE — 96402 CHEMO HORMON ANTINEOPL SQ/IM: CPT | Mod: PN

## 2025-04-14 RX ADMIN — LEUPROLIDE ACETATE 45 MG: KIT at 12:04

## 2025-04-14 NOTE — PROGRESS NOTES
Subjective:       Patient ID: Dutch Olivier is a 79 y.o. male.    Chief Complaint: Follow-up    HPI    79-year-old with a distant history of prostate cancer.  He underwent radical prostatectomy in 2000 by Dr. Landers.  His PSA had been undetectable however in 2015 we begin to see a slight increase in his PSA.  His PSA increased to 21.5.  Bone scan was obtained which did show 2 suspicious lesions.  He began androgen ablation.  He was given his 1st Eligard injection in February 2021.  He also began apalutamide in May 2021.  His most recent PSA is undetectable.  He discontinued apalutamide in mid January 2023 as it was too expensive and is now taking bicalutamide.  His most recent PSA is undetectable.  He also has a long history of dysuria and cystoscopy noted a large calcification on an ectopic suture near the bladder neck.  This was removed two weeks ago and he says his pain has completely resolved.  He notes slightly worsening incontinence since removal of the stone.      Review of Systems   Constitutional:  Negative for fever.   Genitourinary:  Negative for dysuria and hematuria.       Objective:      Physical Exam  Vitals reviewed.   Constitutional:       Appearance: He is well-developed.   Pulmonary:      Effort: Pulmonary effort is normal.   Neurological:      Mental Status: He is alert and oriented to person, place, and time.         Assessment:       1. Prostate cancer metastatic to bone    2. Bladder stone        Plan:       Prostate cancer metastatic to bone  -     Prostate Specific Antigen, Diagnostic; Future; Expected date: 10/14/2025    Bladder stone    Other orders  -     Cancel: leuprolide acetate (6 month) injection 45 mg      Continue Lupron and bicalutamide follow-up 6 months    Visit today included increased complexity associated with the care of prostate cancer and managing the longitudinal care of the patient due to the serious and/or complex managed problem(s) of prostate cancer.

## 2025-04-22 ENCOUNTER — HOSPITAL ENCOUNTER (OUTPATIENT)
Dept: CARDIOLOGY | Facility: HOSPITAL | Age: 80
Discharge: HOME | End: 2025-04-22
Attending: INTERNAL MEDICINE
Payer: MEDICARE

## 2025-04-22 VITALS — WEIGHT: 184 LBS | HEIGHT: 74 IN | BODY MASS INDEX: 23.61 KG/M2

## 2025-04-22 DIAGNOSIS — I27.20 PULMONARY HYPERTENSION: ICD-10-CM

## 2025-04-22 DIAGNOSIS — I34.0 MILD MITRAL REGURGITATION: ICD-10-CM

## 2025-04-22 DIAGNOSIS — I10 ESSENTIAL HYPERTENSION: ICD-10-CM

## 2025-04-22 DIAGNOSIS — I70.0 AORTO-ILIAC ATHEROSCLEROSIS: ICD-10-CM

## 2025-04-22 DIAGNOSIS — I35.0 AORTIC VALVE STENOSIS, MODERATE: ICD-10-CM

## 2025-04-22 DIAGNOSIS — E78.2 MIXED HYPERLIPIDEMIA: ICD-10-CM

## 2025-04-22 DIAGNOSIS — I70.8 AORTO-ILIAC ATHEROSCLEROSIS: ICD-10-CM

## 2025-04-22 LAB
ASCENDING AORTA: 3.27 CM
AV INDEX (PROSTH): 0.17
AV MEAN GRADIENT: 44 MMHG
AV PEAK GRADIENT: 71 MMHG
AV REGURGITATION PRESSURE HALF TIME: 678 MS
AV VALVE AREA BY VELOCITY RATIO: 0.9 CM²
AV VALVE AREA: 0.9 CM²
AV VELOCITY RATIO: 0.17
BSA FOR ECHO PROCEDURE: 2.09 M2
CV ECHO LV RWT: 0.34 CM
DOP CALC AO PEAK VEL: 4.2 M/S
DOP CALC AO VTI: 113.5 CM
DOP CALC LVOT AREA: 5.3 CM2
DOP CALC LVOT DIAMETER: 2.6 CM
DOP CALC LVOT PEAK VEL: 0.7 M/S
DOP CALC LVOT STROKE VOLUME: 104.5 CM3
DOP CALCLVOT PEAK VEL VTI: 19.7 CM
E WAVE DECELERATION TIME: 236 MSEC
E/A RATIO: 0.93
E/E' RATIO: 9 M/S
ECHO LV POSTERIOR WALL: 0.9 CM (ref 0.6–1.1)
FRACTIONAL SHORTENING: 26.4 % (ref 28–44)
INTERVENTRICULAR SEPTUM: 1 CM (ref 0.6–1.1)
IVRT: 111 MSEC
LEFT ATRIUM AREA SYSTOLIC (APICAL 2 CHAMBER): 21.58 CM2
LEFT ATRIUM AREA SYSTOLIC (APICAL 4 CHAMBER): 18.64 CM2
LEFT ATRIUM SIZE: 4.3 CM
LEFT ATRIUM VOLUME INDEX MOD: 25 ML/M2
LEFT ATRIUM VOLUME MOD: 53 ML
LEFT INTERNAL DIMENSION IN SYSTOLE: 3.9 CM (ref 2.1–4)
LEFT VENTRICLE DIASTOLIC VOLUME INDEX: 63.33 ML/M2
LEFT VENTRICLE DIASTOLIC VOLUME: 133 ML
LEFT VENTRICLE END SYSTOLIC VOLUME APICAL 2 CHAMBER: 60.27 ML
LEFT VENTRICLE END SYSTOLIC VOLUME APICAL 4 CHAMBER: 44.4 ML
LEFT VENTRICLE MASS INDEX: 89.2 G/M2
LEFT VENTRICLE SYSTOLIC VOLUME INDEX: 31.9 ML/M2
LEFT VENTRICLE SYSTOLIC VOLUME: 67 ML
LEFT VENTRICULAR INTERNAL DIMENSION IN DIASTOLE: 5.3 CM (ref 3.5–6)
LEFT VENTRICULAR MASS: 187.3 G
LV LATERAL E/E' RATIO: 8.1 M/S
LV SEPTAL E/E' RATIO: 10.8 M/S
LVED V (TEICH): 133.35 ML
LVES V (TEICH): 66.66 ML
LVOT MG: 1.04 MMHG
LVOT MV: 0.48 CM/S
MV PEAK A VEL: 0.7 M/S
MV PEAK E VEL: 0.65 M/S
MV STENOSIS PRESSURE HALF TIME: 68.5 MS
MV VALVE AREA P 1/2 METHOD: 3.21 CM2
OHS CV RV/LV RATIO: 0.7 CM
PISA AR MAX VEL: 4.09 M/S
PISA TR MAX VEL: 2.8 M/S
PULM VEIN S/D RATIO: 1.36
PV PEAK D VEL: 0.59 M/S
PV PEAK S VEL: 0.8 M/S
RA PRESSURE ESTIMATED: 3 MMHG
RIGHT VENTRICLE DIASTOLIC BASEL DIMENSION: 3.7 CM
RIGHT VENTRICLE DIASTOLIC LENGTH: 6.5 CM
RIGHT VENTRICLE DIASTOLIC MID DIMENSION: 2.6 CM
RIGHT VENTRICULAR END-DIASTOLIC DIMENSION: 3.69 CM
RIGHT VENTRICULAR LENGTH IN DIASTOLE (APICAL 4-CHAMBER VIEW): 6.52 CM
RV MID DIAMA: 2.56 CM
RV TB RVSP: 6 MMHG
RV TISSUE DOPPLER FREE WALL SYSTOLIC VELOCITY 1 (APICAL 4 CHAMBER VIEW): 13.64 CM/S
SINUS: 3.21 CM
STJ: 2.76 CM
TDI LATERAL: 0.08 M/S
TDI SEPTAL: 0.06 M/S
TDI: 0.07 M/S
TR MAX PG: 32 MMHG
TRICUSPID ANNULAR PLANE SYSTOLIC EXCURSION: 2.29 CM
TV REST PULMONARY ARTERY PRESSURE: 34 MMHG
Z-SCORE OF LEFT VENTRICULAR DIMENSION IN END DIASTOLE: -2.08
Z-SCORE OF LEFT VENTRICULAR DIMENSION IN END SYSTOLE: -0.18

## 2025-04-22 PROCEDURE — 93306 TTE W/DOPPLER COMPLETE: CPT | Mod: PO

## 2025-04-22 PROCEDURE — 93306 TTE W/DOPPLER COMPLETE: CPT | Mod: 26,,, | Performed by: INTERNAL MEDICINE

## 2025-04-23 ENCOUNTER — TELEPHONE (OUTPATIENT)
Dept: CARDIOLOGY | Facility: CLINIC | Age: 80
End: 2025-04-23
Payer: MEDICARE

## 2025-04-23 NOTE — TELEPHONE ENCOUNTER
Spoke with pt in regards to r/s appt 5/1 @11:45am due to Dr. Carreno having a meeting. Offered pt appt earlier or later on sameday, pt accepted earlier appt. Successfully r/s.

## 2025-04-25 ENCOUNTER — TELEPHONE (OUTPATIENT)
Dept: FAMILY MEDICINE | Facility: CLINIC | Age: 80
End: 2025-04-25
Payer: MEDICARE

## 2025-04-25 DIAGNOSIS — Z79.899 HIGH RISK MEDICATIONS (NOT ANTICOAGULANTS) LONG-TERM USE: ICD-10-CM

## 2025-04-25 DIAGNOSIS — E53.9 VITAMIN B DEFICIENCY, UNSPECIFIED: ICD-10-CM

## 2025-04-25 DIAGNOSIS — D50.9 IRON DEFICIENCY ANEMIA, UNSPECIFIED IRON DEFICIENCY ANEMIA TYPE: Primary | ICD-10-CM

## 2025-04-25 DIAGNOSIS — Z00.00 MEDICARE ANNUAL WELLNESS VISIT, SUBSEQUENT: ICD-10-CM

## 2025-04-25 NOTE — TELEPHONE ENCOUNTER
----- Message from Sahara sent at 4/25/2025  9:15 AM CDT -----  Type:  Appointment RequestCaller is requesting a appointment. Name of Caller:pt When is the first available appointment?Aug Symptoms:pt wants to f/u regarding iron deficiency Would the patient rather a call back or a response via [facility]? Please call no portal Best Call Back Number:[phone] Additional Information: pt needs to be seen first week of June      Please call back to advise. Thanks!

## 2025-04-25 NOTE — TELEPHONE ENCOUNTER
Patient scheduled a follow up for iron deficiency with the nurse practitioner on 5/5 and is asking if you can place lab orders to test his iron and other orders that you think he'll need. Please advise.

## 2025-05-01 ENCOUNTER — OFFICE VISIT (OUTPATIENT)
Dept: CARDIOLOGY | Facility: CLINIC | Age: 80
End: 2025-05-01
Payer: MEDICARE

## 2025-05-01 ENCOUNTER — LAB VISIT (OUTPATIENT)
Dept: LAB | Facility: HOSPITAL | Age: 80
End: 2025-05-01
Attending: INTERNAL MEDICINE
Payer: MEDICARE

## 2025-05-01 VITALS
WEIGHT: 186.06 LBS | BODY MASS INDEX: 23.88 KG/M2 | HEIGHT: 74 IN | SYSTOLIC BLOOD PRESSURE: 140 MMHG | HEART RATE: 62 BPM | DIASTOLIC BLOOD PRESSURE: 70 MMHG

## 2025-05-01 DIAGNOSIS — I35.8 AORTIC VALVE SCLEROSIS: Primary | ICD-10-CM

## 2025-05-01 DIAGNOSIS — Z00.00 MEDICARE ANNUAL WELLNESS VISIT, SUBSEQUENT: ICD-10-CM

## 2025-05-01 DIAGNOSIS — I34.0 MILD MITRAL REGURGITATION: ICD-10-CM

## 2025-05-01 DIAGNOSIS — I35.0 AORTIC VALVE STENOSIS, MODERATE: Primary | ICD-10-CM

## 2025-05-01 DIAGNOSIS — C79.51 PROSTATE CANCER METASTATIC TO BONE: ICD-10-CM

## 2025-05-01 DIAGNOSIS — F17.200 TOBACCO USE DISORDER: ICD-10-CM

## 2025-05-01 DIAGNOSIS — I27.20 PULMONARY HYPERTENSION: ICD-10-CM

## 2025-05-01 DIAGNOSIS — C61 PROSTATE CANCER: ICD-10-CM

## 2025-05-01 DIAGNOSIS — I10 ESSENTIAL HYPERTENSION: ICD-10-CM

## 2025-05-01 DIAGNOSIS — D50.9 IRON DEFICIENCY ANEMIA, UNSPECIFIED IRON DEFICIENCY ANEMIA TYPE: ICD-10-CM

## 2025-05-01 DIAGNOSIS — Z79.899 HIGH RISK MEDICATIONS (NOT ANTICOAGULANTS) LONG-TERM USE: ICD-10-CM

## 2025-05-01 DIAGNOSIS — C61 PROSTATE CANCER METASTATIC TO BONE: ICD-10-CM

## 2025-05-01 DIAGNOSIS — Z86.002 HISTORY OF CARCINOMA IN SITU OF PROSTATE: ICD-10-CM

## 2025-05-01 LAB
ERYTHROCYTE [DISTWIDTH] IN BLOOD BY AUTOMATED COUNT: 13.1 % (ref 11.5–14.5)
FOLATE SERPL-MCNC: >40 NG/ML (ref 4–24)
HCT VFR BLD AUTO: 39.5 % (ref 40–54)
HGB BLD-MCNC: 12.7 GM/DL (ref 14–18)
IRON SATN MFR SERPL: 18 % (ref 20–50)
IRON SERPL-MCNC: 68 UG/DL (ref 45–160)
MCH RBC QN AUTO: 29.4 PG (ref 27–31)
MCHC RBC AUTO-ENTMCNC: 32.2 G/DL (ref 32–36)
MCV RBC AUTO: 91 FL (ref 82–98)
PLATELET # BLD AUTO: 359 K/UL (ref 150–450)
PMV BLD AUTO: 9.9 FL (ref 9.2–12.9)
RBC # BLD AUTO: 4.32 M/UL (ref 4.6–6.2)
TIBC SERPL-MCNC: 383 UG/DL (ref 250–450)
TRANSFERRIN SERPL-MCNC: 259 MG/DL (ref 200–375)
VIT B12 SERPL-MCNC: 417 PG/ML (ref 210–950)
WBC # BLD AUTO: 8.48 K/UL (ref 3.9–12.7)

## 2025-05-01 PROCEDURE — 3288F FALL RISK ASSESSMENT DOCD: CPT | Mod: CPTII,S$GLB,, | Performed by: INTERNAL MEDICINE

## 2025-05-01 PROCEDURE — 3077F SYST BP >= 140 MM HG: CPT | Mod: CPTII,S$GLB,, | Performed by: INTERNAL MEDICINE

## 2025-05-01 PROCEDURE — 85027 COMPLETE CBC AUTOMATED: CPT

## 2025-05-01 PROCEDURE — 83540 ASSAY OF IRON: CPT

## 2025-05-01 PROCEDURE — 1101F PT FALLS ASSESS-DOCD LE1/YR: CPT | Mod: CPTII,S$GLB,, | Performed by: INTERNAL MEDICINE

## 2025-05-01 PROCEDURE — 99214 OFFICE O/P EST MOD 30 MIN: CPT | Mod: S$GLB,,, | Performed by: INTERNAL MEDICINE

## 2025-05-01 PROCEDURE — 99999 PR PBB SHADOW E&M-EST. PATIENT-LVL III: CPT | Mod: PBBFAC,,, | Performed by: INTERNAL MEDICINE

## 2025-05-01 PROCEDURE — 3078F DIAST BP <80 MM HG: CPT | Mod: CPTII,S$GLB,, | Performed by: INTERNAL MEDICINE

## 2025-05-01 PROCEDURE — 82607 VITAMIN B-12: CPT

## 2025-05-01 PROCEDURE — 36415 COLL VENOUS BLD VENIPUNCTURE: CPT | Mod: PO

## 2025-05-01 PROCEDURE — 1126F AMNT PAIN NOTED NONE PRSNT: CPT | Mod: CPTII,S$GLB,, | Performed by: INTERNAL MEDICINE

## 2025-05-01 PROCEDURE — 1159F MED LIST DOCD IN RCRD: CPT | Mod: CPTII,S$GLB,, | Performed by: INTERNAL MEDICINE

## 2025-05-01 PROCEDURE — 82746 ASSAY OF FOLIC ACID SERUM: CPT

## 2025-05-01 NOTE — PROGRESS NOTES
Subjective:    Patient ID:  Dutch Olivier is a 79 y.o. male patient here for evaluation Follow-up (6 month)      History of Present Illness:  Valvular heart disease.  Recent echo with aortic V max greater than 4.0, aortic valve area 0.9 cm2.  Symptomatic with HATCH.  Mild MR.  EF normal.     Concomitant problems of lower extremity squamous cell carcinoma, resected non recurrent.  Ongoing issues with prostate cancer, followed closely by Urology.  PSA normal.    Past noninvasive cardiac assessment via nuclear perfusion imaging and carotid ultrasound 01/2023 unremarkable.  No ischemia.  No significant carotid disease.        Review of patient's allergies indicates:   Allergen Reactions    Aspirin Hives       Past Medical History:   Diagnosis Date    Alcohol abuse, daily use     8/2024 stopped    Amblyopia     OS    Aortic valve stenosis     Cancer     melanoma, prostate    Cataract     Complication of anesthesia     says he sometimes is slow to awaken    COPD (chronic obstructive pulmonary disease)     no oxygen, no inhalers, or nebulizers    Diverticulosis     Gross hematuria     History of colonic polyps     History of malignant melanoma 01/01/2011    right ear, had chemo//Dr Bessett    HTN (hypertension)     Hyperlipemia     Kidney stone      Past Surgical History:   Procedure Laterality Date    cystolithopaxy      CYSTOSCOPIC LITHOLAPAXY N/A 3/25/2025    Procedure: CYSTOLITHOLAPAXY - with Thulium Laser - Bladder Stone;  Surgeon: EZ Kwan MD;  Location: [facility] OR;  Service: Urology;  Laterality: N/A;    EXTERNAL EAR SURGERY  01/01/2011    melanoma removed right ear    HERNIA REPAIR      RIH    PORTACATH PLACEMENT  01/01/2011    later removed    PROSTATE SURGERY  01/01/2003    prostatectomy     Social History[1]     Review of Systems:    As noted in HPI in addition      REVIEW OF SYSTEMS  Review of Systems   Constitutional: Negative for decreased appetite, diaphoresis, night sweats, weight gain and weight  loss.   HENT:  Negative for nosebleeds and odynophagia.    Eyes:  Negative for double vision and photophobia.   Cardiovascular:  Positive for dyspnea on exertion. Negative for chest pain, claudication, cyanosis, irregular heartbeat, leg swelling, near-syncope, orthopnea, palpitations, paroxysmal nocturnal dyspnea and syncope.   Respiratory:  Negative for cough, hemoptysis, shortness of breath and wheezing.    Hematologic/Lymphatic: Negative for adenopathy.   Skin:  Negative for flushing, skin cancer and suspicious lesions.   Musculoskeletal:  Negative for gout, myalgias and neck pain.   Gastrointestinal:  Negative for abdominal pain, heartburn, hematemesis and hematochezia.   Genitourinary:  Negative for bladder incontinence, hesitancy and nocturia.   Neurological:  Negative for focal weakness, headaches, light-headedness and paresthesias.   Psychiatric/Behavioral:  Negative for memory loss and substance abuse.               Objective:        Vitals:    05/01/25 0905   BP: (!) 140/70   Pulse: 62       Lab Results   Component Value Date    WBC 8.82 03/20/2025    HGB 13.0 (L) 03/20/2025    HCT 37.4 (L) 03/20/2025     03/20/2025    CHOL 180 10/08/2024    TRIG 219 (H) 10/08/2024    HDL 40 10/08/2024    ALT 15 10/08/2024    AST 16 10/08/2024     03/20/2025    K 4.3 03/20/2025     03/20/2025    CREATININE 0.95 03/20/2025    BUN 14 03/20/2025    CO2 29 03/20/2025    TSH 0.721 09/13/2023    PSA <0.01 03/18/2025    INR 1.0 03/20/2025    HGBA1C 5.1 10/08/2024        ECHOCARDIOGRAM RESULTS  Results for orders placed during the hospital encounter of 04/22/25    Echo    Interpretation Summary    Left Ventricle: The left ventricle is normal in size. Normal wall thickness. There is normal systolic function with a visually estimated ejection fraction of 60 - 65%. There is normal diastolic function.    Right Ventricle: The right ventricle is normal in size. Wall thickness is normal. Systolic function is normal.     Aortic Valve: The aortic valve is a trileaflet valve. There is moderate to severe aortic valve sclerosis. Severely restricted motion. There is severe stenosis. Aortic valve area by VTI is 0.9 cm². Aortic valve peak velocity is 4.2 m/s. Mean gradient is 44 mmHg. The dimensionless index is 0.17.    Pulmonary Artery: There is borderline elevated pulmonary hypertension. The estimated pulmonary artery systolic pressure is 34 mmHg.    IVC/SVC: Normal venous pressure at 3 mmHg.    No results found for this or any previous visit.          CURRENT/PREVIOUS VISIT EKG  Results for orders placed or performed during the hospital encounter of 03/20/25   EKG 12-lead    Collection Time: 03/20/25 10:19 AM   Result Value Ref Range    QRS Duration 112 ms    OHS QTC Calculation 431 ms    Narrative    Test Reason : R31.0,    Vent. Rate :  51 BPM     Atrial Rate :  51 BPM     P-R Int : 168 ms          QRS Dur : 112 ms      QT Int : 468 ms       P-R-T Axes :  72  61  72 degrees    QTcB Int : 431 ms    Sinus bradycardia  Moderate voltage criteria for LVH, may be normal variant  Borderline Abnormal ECG  When compared with ECG of 18-Jan-2023 08:45,  T wave amplitude has increased in Anterior leads  Confirmed by Jacobo Tejada ([id number]) on 3/24/2025 12:32:44 PM    Referred By: DILLON SAUCEDO           Confirmed By: Jacobo Tejada     No valid procedures specified.   Results for orders placed during the hospital encounter of 01/18/23    Nuclear Stress - Cardiology Interpreted    Interpretation Summary    Normal myocardial perfusion scan. There is no evidence of myocardial ischemia or infarction.    There is a  mild intensity fixed perfusion abnormality in the  wall of the left ventricle secondary to diaphragm attenuation.    There is mild to moderate apical thinning which is a normal variant.    The gated perfusion images showed an ejection fraction of 68% post stress.    There is normal wall motion post stress.    LV cavity size is normal at  rest and normal at stress.    The ECG portion of the study is negative for ischemia.    The patient reported no chest pain during the stress test.    Low risk study for ischemia.    No valid procedures specified.    PHYSICAL EXAM  GENERAL: well built, well nourished, well-developed in no apparent distress alert and oriented.   HEENT: Normocephalic. Pupils normal and conjunctivae normal.  Mucous membranes normal, no cyanosis or icterus, trachea central,no pallor or icterus is noted..   NECK: No JVD. No bruit..   THYROID: Thyroid not enlarged. No nodules present..   CARDIAC:  Distant S1-S2.  Grade 2-3/6 crescendo decrescendo murmur aortic area.  Pulses et tardus.  LUNGS: Clear to auscultation. No wheezing or rhonchi..   ABDOMEN: Soft no masses or organomegaly.  No abdomen pulsations or bruits.  Normal bowel sounds. No pulsations and no masses felt, No guarding or rebound.   URINARY: No anna catheter   EXTREMITIES: No cyanosis, clubbing or edema noted at this time., no calf tenderness bilaterally.   PERIPHERAL VASCULAR SYSTEM: Good palpable distal pulses.  2+ femoral, popliteal and pedal pulses.  No bruits    CENTRAL NERVOUS SYSTEM: No focal motor or sensory deficits noted.   SKIN: Skin without lesions, moist, well perfused.   MUSCLE STRENGTH & TONE: No noteable weakness, atrophy or abnormal movement    I HAVE REVIEWED :    The vital signs, nurses notes, and all the pertinent radiology and labs.         Current Outpatient Medications   Medication Instructions    b complex vitamins tablet 1 tablet, Daily    benazepriL (LOTENSIN) 20 mg, Oral, 2 times daily    bicalutamide (CASODEX) 50 mg, Oral, Daily    hydroCHLOROthiazide 12.5 mg, Oral, Every morning    multivitamin capsule 1 capsule, Daily    pravastatin (PRAVACHOL) 40 mg, Oral, Daily          Assessment:   Valvular heart disease.  Severe AS, mild MR.  EF normal.  Negative nuclear perfusion imaging 2023 with unremarkable carotid ultrasound.  Symptomatic with HATCH.   Echo 04/2025.    History of prostate CA follows with Urology, stable.    Dyslipidemia  Hypertension        Plan:   . Continue present meds, refer to structural heart for evaluation.  See me again in 4 months.          No follow-ups on file.            [1]   Social History  Tobacco Use    Smoking status: Every Day     Current packs/day: 1.00     Types: Cigarettes    Smokeless tobacco: Never   Substance Use Topics    Alcohol use: Not Currently     Comment: 6-7 nightly for yrs/ stopped 2024    Drug use: No

## 2025-05-02 ENCOUNTER — TELEPHONE (OUTPATIENT)
Dept: CARDIOLOGY | Facility: CLINIC | Age: 80
End: 2025-05-02
Payer: MEDICARE

## 2025-05-02 NOTE — TELEPHONE ENCOUNTER
Spoke with patient and confirmed his appts at [facility] for TAVR eval. Advised to call for any questions or concerns. TIARA

## 2025-05-05 ENCOUNTER — OFFICE VISIT (OUTPATIENT)
Dept: FAMILY MEDICINE | Facility: CLINIC | Age: 80
End: 2025-05-05
Payer: MEDICARE

## 2025-05-05 VITALS
BODY MASS INDEX: 23.77 KG/M2 | OXYGEN SATURATION: 98 % | HEIGHT: 74 IN | WEIGHT: 185.19 LBS | HEART RATE: 60 BPM | DIASTOLIC BLOOD PRESSURE: 60 MMHG | SYSTOLIC BLOOD PRESSURE: 110 MMHG

## 2025-05-05 DIAGNOSIS — I10 ESSENTIAL HYPERTENSION: ICD-10-CM

## 2025-05-05 DIAGNOSIS — Z79.899 HIGH RISK MEDICATIONS (NOT ANTICOAGULANTS) LONG-TERM USE: ICD-10-CM

## 2025-05-05 DIAGNOSIS — J44.9 CHRONIC OBSTRUCTIVE PULMONARY DISEASE, UNSPECIFIED COPD TYPE: ICD-10-CM

## 2025-05-05 DIAGNOSIS — E78.2 MIXED HYPERLIPIDEMIA: ICD-10-CM

## 2025-05-05 DIAGNOSIS — R73.02 GLUCOSE INTOLERANCE (IMPAIRED GLUCOSE TOLERANCE): ICD-10-CM

## 2025-05-05 DIAGNOSIS — E53.9 VITAMIN B DEFICIENCY, UNSPECIFIED: ICD-10-CM

## 2025-05-05 DIAGNOSIS — D50.9 IRON DEFICIENCY ANEMIA, UNSPECIFIED IRON DEFICIENCY ANEMIA TYPE: Primary | ICD-10-CM

## 2025-05-05 PROCEDURE — 1101F PT FALLS ASSESS-DOCD LE1/YR: CPT | Mod: CPTII,S$GLB,, | Performed by: NURSE PRACTITIONER

## 2025-05-05 PROCEDURE — 1159F MED LIST DOCD IN RCRD: CPT | Mod: CPTII,S$GLB,, | Performed by: NURSE PRACTITIONER

## 2025-05-05 PROCEDURE — 99999 PR PBB SHADOW E&M-EST. PATIENT-LVL III: CPT | Mod: PBBFAC,,, | Performed by: NURSE PRACTITIONER

## 2025-05-05 PROCEDURE — 1160F RVW MEDS BY RX/DR IN RCRD: CPT | Mod: CPTII,S$GLB,, | Performed by: NURSE PRACTITIONER

## 2025-05-05 PROCEDURE — 99214 OFFICE O/P EST MOD 30 MIN: CPT | Mod: S$GLB,,, | Performed by: NURSE PRACTITIONER

## 2025-05-05 PROCEDURE — 3078F DIAST BP <80 MM HG: CPT | Mod: CPTII,S$GLB,, | Performed by: NURSE PRACTITIONER

## 2025-05-05 PROCEDURE — 3074F SYST BP LT 130 MM HG: CPT | Mod: CPTII,S$GLB,, | Performed by: NURSE PRACTITIONER

## 2025-05-05 PROCEDURE — 3288F FALL RISK ASSESSMENT DOCD: CPT | Mod: CPTII,S$GLB,, | Performed by: NURSE PRACTITIONER

## 2025-05-05 NOTE — PATIENT INSTRUCTIONS
Do not hesitate to get in touch with me should you have any further questions.      Thank you for trusting me with your care!  I wish you health and happiness.     MARKOS Mcgill

## 2025-05-05 NOTE — PROGRESS NOTES
THIS DOCUMENT WAS MADE IN PART WITH VOICE RECOGNITION SOFTWARE.  OCCASIONALLY THIS SOFTWARE WILL MISINTERPRET WORDS OR PHRASES.     Assessment and Plan:    Iron deficiency anemia, unspecified iron deficiency anemia type  -     CBC Without Differential; Future; Expected date: 05/05/2025  -     Iron and TIBC; Future; Expected date: 05/05/2025    Essential hypertension  Comments:  Controlled  Continue regimen    Mixed hyperlipidemia  Comments:  Controlled  Continue pravastatin  Orders:  -     Lipid Panel; Future; Expected date: 05/05/2025    Chronic obstructive pulmonary disease, unspecified COPD type  Comments:  Denies shortness of breath  Stable  Continue to monitor  Follow up with PCP    Vitamin B deficiency, unspecified  -     Vitamin B12; Future; Expected date: 05/05/2025  -     Folate; Future; Expected date: 05/05/2025    High risk medications (not anticoagulants) long-term use  -     Vitamin B12; Future; Expected date: 05/05/2025  -     Folate; Future; Expected date: 05/05/2025    Glucose intolerance (impaired glucose tolerance)  -     Comprehensive Metabolic Panel; Future; Expected date: 05/05/2025  -     Hemoglobin A1C; Future; Expected date: 05/05/2025             Visit summary:        Chronic conditions reviewed  Assessed recent lab results: hemoglobin stable at 12.7 (previously 13), iron profile and vitamin B12 levels.  Considered current multivitamin regimen and its impact on iron levels.  Discussed options for iron supplementation, weighing benefits against potential side effects including constipation.  Reviewed BP management, noting current medications are effective.  Evaluated need for medication adjustments, particularly Benazepril dosage.      PLAN SUMMARY:  Chronic conditions stable  Continued HCTZ 12.5 mg for blood pressure management  Changed Benazepril prescription from 20 mg 2 tab daily to 40 mg 1 tab daily at next renewal for convenience.  Continued current iron supplementation through  women's multivitamin (18 mg daily)  Continued B12 complex supplement  Offered option to add separate iron supplement with every other day dosing  Follow-up appointment scheduled for November for annual with PCP          Follow up in about 6 months (around 11/5/2025) for Follow-up with PCP.   ______________________________________________________________________  Subjective:    History of Present Illness    CHIEF COMPLAINT:  - Patient presents today to discuss results of recent labs, particularly regarding his iron deficiency anemia.    HPI:  Patient had recent labs to assess his iron deficiency anemia. He reports switching from men's to women's One a Day vitamins to include iron supplementation, now taking 18 mg of iron daily through this supplement. Patient is uncertain about changes in his condition since the last set of tests conducted about a month ago.    Patient reports a history of urinary blood, initially treated as a UTI by Dr. Aguero for about 2 years. A subsequent cystoscopy revealed the issue was due to a kidney stone, which has since been removed. He reports resolution of this urinary issue.    Patient reports a history of elevated blood pressure in the evenings, which he attributes to previous heavy wine consumption. He has since discontinued wine consumption and adjusted his blood pressure medication regimen accordingly.    Patient denies any current bleeding or urinary symptoms.      Medications:  Medications Ordered Prior to Encounter[1]        Review of Systems:  Review of Systems   Constitutional:  Negative for diaphoresis, fatigue and fever.   HENT:  Negative for congestion.    Eyes:  Negative for visual disturbance.   Respiratory:  Negative for shortness of breath.    Cardiovascular:  Negative for chest pain.   Gastrointestinal:  Negative for abdominal pain, diarrhea, nausea and vomiting.   Endocrine: Negative for polydipsia, polyphagia and polyuria.   Genitourinary:  Negative for difficulty  "urinating, dysuria and hematuria.   Musculoskeletal:  Negative for back pain.   Neurological:  Negative for syncope, weakness and numbness.       Past Medical History:  Past Medical History:   Diagnosis Date    Alcohol abuse, daily use     8/2024 stopped    Amblyopia     OS    Aortic valve stenosis     Cancer     melanoma, prostate    Cataract     Complication of anesthesia     says he sometimes is slow to awaken    COPD (chronic obstructive pulmonary disease)     no oxygen, no inhalers, or nebulizers    Diverticulosis     Gross hematuria     History of colonic polyps     History of malignant melanoma 01/01/2011    right ear, had chemo//Dr Gongora    HTN (hypertension)     Hyperlipemia     Kidney stone        Objective:    Vitals:  Vitals:    05/05/25 1025   BP: 110/60   Pulse: 60   SpO2: 98%   Weight: 84 kg (185 lb 3 oz)   Height: 6' 2" (1.88 m)       Physical Exam  Vitals and nursing note reviewed.   Constitutional:       General: He is not in acute distress.  HENT:      Head: Normocephalic and atraumatic.      Mouth/Throat:      Mouth: Mucous membranes are moist.      Pharynx: Oropharynx is clear.   Eyes:      General: No scleral icterus.     Conjunctiva/sclera: Conjunctivae normal.   Cardiovascular:      Rate and Rhythm: Normal rate and regular rhythm.   Pulmonary:      Effort: Pulmonary effort is normal. No respiratory distress.      Breath sounds: Normal breath sounds.   Musculoskeletal:      Right lower leg: No edema.      Left lower leg: No edema.   Skin:     General: Skin is warm and dry.   Neurological:      Mental Status: He is alert and oriented to person, place, and time.   Psychiatric:         Mood and Affect: Mood normal.         Behavior: Behavior normal.         Thought Content: Thought content normal.         Data:  CMP  Sodium   Date Value Ref Range Status   03/20/2025 141 136 - 145 mmol/L Final     Potassium   Date Value Ref Range Status   03/20/2025 4.3 3.5 - 5.1 mmol/L Final     Comment:     " Anion Gap reference range revised on 4/28/2023     Chloride   Date Value Ref Range Status   03/20/2025 106 95 - 110 mmol/L Final     CO2   Date Value Ref Range Status   03/20/2025 29 23 - 29 mmol/L Final     Glucose   Date Value Ref Range Status   03/20/2025 100 70 - 110 mg/dL Final     Comment:     The ADA recommends the following guidelines for fasting glucose:    Normal:       less than 100 mg/dL    Prediabetes:  100 mg/dL to 125 mg/dL    Diabetes:     126 mg/dL or higher       BUN   Date Value Ref Range Status   03/20/2025 14 8 - 23 mg/dL Final     Creatinine   Date Value Ref Range Status   03/20/2025 0.95 0.50 - 1.40 mg/dL Final     Calcium   Date Value Ref Range Status   03/20/2025 9.3 8.7 - 10.5 mg/dL Final     Total Protein   Date Value Ref Range Status   10/08/2024 7.5 6.0 - 8.4 g/dL Final     Albumin   Date Value Ref Range Status   10/08/2024 4.2 3.5 - 5.2 g/dL Final     Total Bilirubin   Date Value Ref Range Status   10/08/2024 0.8 0.1 - 1.0 mg/dL Final     Comment:     For infants and newborns, interpretation of results should be based  on gestational age, weight and in agreement with clinical  observations.    Premature Infant recommended reference ranges:  Up to 24 hours.............<8.0 mg/dL  Up to 48 hours............<12.0 mg/dL  3-5 days..................<15.0 mg/dL  6-29 days.................<15.0 mg/dL       Alkaline Phosphatase   Date Value Ref Range Status   10/08/2024 80 55 - 135 U/L Final     AST   Date Value Ref Range Status   10/08/2024 16 10 - 40 U/L Final     ALT   Date Value Ref Range Status   10/08/2024 15 10 - 44 U/L Final     Anion Gap   Date Value Ref Range Status   03/20/2025 6 (L) 8 - 16 mmol/L Final     Comment:     Anion Gap reference range revised on 4/28/2023     eGFR   Date Value Ref Range Status   03/20/2025 >60 >60 mL/min/1.73 m^2 Final      Lab Results   Component Value Date    WBC 8.48 05/01/2025    HGB 12.7 (L) 05/01/2025    HCT 39.5 (L) 05/01/2025    MCV 91 05/01/2025      05/01/2025        Lab Results   Component Value Date    UIBC 208 01/03/2011    IRON 68 05/01/2025    TRANSFERRIN 259 05/01/2025    TIBC 383 05/01/2025    LABIRON 18 (L) 05/01/2025    FESATURATED 18 (L) 10/18/2024          Medical history reviewed, Medications reconciled.              SAM Mcgill-C  Family Medicine         [1]   Current Outpatient Medications on File Prior to Visit   Medication Sig Dispense Refill    b complex vitamins tablet Take 1 tablet by mouth once daily.      benazepriL (LOTENSIN) 20 MG tablet Take 1 tablet (20 mg total) by mouth 2 (two) times daily. 180 tablet 2    bicalutamide (CASODEX) 50 MG Tab TAKE 1 TABLET (50 MG TOTAL) BY MOUTH ONCE DAILY. 90 tablet 3    hydroCHLOROthiazide (HYDRODIURIL) 12.5 MG Tab Take 1 tablet (12.5 mg total) by mouth every morning. 90 tablet 2    multivitamin capsule Take 1 capsule by mouth once daily.      pravastatin (PRAVACHOL) 40 MG tablet Take 1 tablet (40 mg total) by mouth once daily. 90 tablet 2     No current facility-administered medications on file prior to visit.

## 2025-05-14 ENCOUNTER — TELEPHONE (OUTPATIENT)
Dept: CARDIOTHORACIC SURGERY | Facility: CLINIC | Age: 80
End: 2025-05-14
Payer: MEDICARE

## 2025-05-14 NOTE — PROGRESS NOTES
Subjective:      Patient ID: Dutch Olivier is a 79 y.o. male.    Chief Complaint: No chief complaint on file.    HPI:  Dutch Olivier is a 79 y.o. male who presents for surgical evaluation of AS. 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  He reports SOB as his main symptom today.  He denies CP, palpations or dizziness.  He presents with a walker.  [facility] scheduled for tomorrow.     Family and social history reviewed    Review of patient's allergies indicates:   Allergen Reactions    Aspirin Hives     Past Medical History:   Diagnosis Date    Alcohol abuse, daily use     8/2024 stopped    Amblyopia     OS    Aortic valve stenosis     Cancer     melanoma, prostate    Cataract     Complication of anesthesia     says he sometimes is slow to awaken    COPD (chronic obstructive pulmonary disease)     no oxygen, no inhalers, or nebulizers    Diverticulosis     Gross hematuria     History of colonic polyps     History of malignant melanoma 01/01/2011    right ear, had chemo// Bessett    HTN (hypertension)     Hyperlipemia     Kidney stone      Past Surgical History:   Procedure Laterality Date    cystolithopaxy      CYSTOSCOPIC LITHOLAPAXY N/A 3/25/2025    Procedure: CYSTOLITHOLAPAXY - with Thulium Laser - Bladder Stone;  Surgeon: EZ Kwan MD;  Location: [facility] OR;  Service: Urology;  Laterality: N/A;    EXTERNAL EAR SURGERY  01/01/2011    melanoma removed right ear    HERNIA REPAIR      RIH    PORTACATH PLACEMENT  01/01/2011    later removed    PROSTATE SURGERY  01/01/2003    prostatectomy     Family History       Problem Relation (Age of Onset)    Alcohol abuse Father, Brother    Blindness Father    Drug abuse Brother    Heart attack Mother    Heart defect Maternal Uncle          Social History[1]  Current Medications[2]  Current medications Reviewed    Review of Systems    Constitutional:  Negative for activity change, appetite change, fatigue and fever.   HENT:  Negative for nosebleeds.    Respiratory:  Negative for cough and shortness of breath.    Cardiovascular:  Negative for chest pain, palpitations and leg swelling.   Gastrointestinal:  Negative for abdominal distention, abdominal pain and nausea.   Genitourinary:  Negative for frequency.   Musculoskeletal:  Negative for arthralgias and myalgias.   Skin:  Negative for rash.   Neurological:  Negative for dizziness and numbness.   Hematological:  Does not bruise/bleed easily.     Objective:   Physical Exam  HENT:      Head: Normocephalic and atraumatic.   Eyes:      Extraocular Movements: Extraocular movements intact.   Cardiovascular:      Rate and Rhythm: Normal rate and regular rhythm.   Pulmonary:      Effort: Pulmonary effort is normal.   Abdominal:      General: Abdomen is flat.      Palpations: Abdomen is soft.   Musculoskeletal:         General: Normal range of motion.      Cervical back: Normal range of motion.   Skin:     General: Skin is warm and dry.      Capillary Refill: Capillary refill takes less than 2 seconds.   Neurological:      General: No focal deficit present.       Diagnostic Results: Reviewed   CTA TAVR:  Images reviewed     TTE 4/22/25    Left Ventricle: The left ventricle is normal in size. Normal wall thickness. There is normal systolic function with a visually estimated ejection fraction of 60 - 65%. There is normal diastolic function.    Right Ventricle: The right ventricle is normal in size. Wall thickness is normal. Systolic function is normal.    Aortic Valve: The aortic valve is a trileaflet valve. There is moderate to severe aortic valve sclerosis. Severely restricted motion. There is severe stenosis. Aortic valve area by VTI is 0.9 cm². Aortic valve peak velocity is 4.2 m/s. Mean gradient is 44 mmHg. The dimensionless index is 0.17.    Pulmonary Artery: There is borderline elevated pulmonary  hypertension. The estimated pulmonary artery systolic pressure is 34 mmHg.    IVC/SVC: Normal venous pressure at 3 mmHg.  Assessment:   AS  Plan:   Continue with [facility] evaluation.  Patient is considered high risk for SAVR  due to multiple comorbid conditions.         [1]   Social History  Socioeconomic History    Marital status:     Number of children: 5   Occupational History     Employer: [employer]   Tobacco Use    Smoking status: Every Day     Current packs/day: 1.00     Types: Cigarettes     Passive exposure: Current    Smokeless tobacco: Never   Substance and Sexual Activity    Alcohol use: Not Currently     Comment: 6-7 nightly for yrs/ stopped 2024    Drug use: No   [2]   Current Outpatient Medications:     b complex vitamins tablet, Take 1 tablet by mouth once daily., Disp: , Rfl:     benazepriL (LOTENSIN) 20 MG tablet, Take 1 tablet (20 mg total) by mouth 2 (two) times daily., Disp: 180 tablet, Rfl: 2    bicalutamide (CASODEX) 50 MG Tab, TAKE 1 TABLET (50 MG TOTAL) BY MOUTH ONCE DAILY., Disp: 90 tablet, Rfl: 3    hydroCHLOROthiazide (HYDRODIURIL) 12.5 MG Tab, Take 1 tablet (12.5 mg total) by mouth every morning., Disp: 90 tablet, Rfl: 2    multivitamin capsule, Take 1 capsule by mouth once daily., Disp: , Rfl:     pravastatin (PRAVACHOL) 40 MG tablet, Take 1 tablet (40 mg total) by mouth once daily., Disp: 90 tablet, Rfl: 2  No current facility-administered medications for this visit.

## 2025-05-14 NOTE — TELEPHONE ENCOUNTER
Called pt to confirm 5/15 appt with Dr. Barlow; no answer. LVM with appt date, times, and locations. Contact number provided.

## 2025-05-15 ENCOUNTER — PATIENT MESSAGE (OUTPATIENT)
Dept: CARDIOLOGY | Facility: CLINIC | Age: 80
End: 2025-05-15

## 2025-05-15 ENCOUNTER — OFFICE VISIT (OUTPATIENT)
Dept: CARDIOTHORACIC SURGERY | Facility: CLINIC | Age: 80
End: 2025-05-15
Payer: MEDICARE

## 2025-05-15 ENCOUNTER — OFFICE VISIT (OUTPATIENT)
Dept: CARDIOLOGY | Facility: CLINIC | Age: 80
End: 2025-05-15
Payer: MEDICARE

## 2025-05-15 ENCOUNTER — EDUCATION (OUTPATIENT)
Dept: CARDIOLOGY | Facility: CLINIC | Age: 80
End: 2025-05-15
Payer: MEDICARE

## 2025-05-15 ENCOUNTER — HOSPITAL ENCOUNTER (OUTPATIENT)
Dept: RADIOLOGY | Facility: HOSPITAL | Age: 80
Discharge: HOME | End: 2025-05-15
Attending: INTERNAL MEDICINE
Payer: MEDICARE

## 2025-05-15 VITALS
SYSTOLIC BLOOD PRESSURE: 125 MMHG | WEIGHT: 184.88 LBS | HEIGHT: 74 IN | OXYGEN SATURATION: 98 % | DIASTOLIC BLOOD PRESSURE: 61 MMHG | HEART RATE: 69 BPM | BODY MASS INDEX: 23.73 KG/M2

## 2025-05-15 VITALS
SYSTOLIC BLOOD PRESSURE: 153 MMHG | BODY MASS INDEX: 23.74 KG/M2 | HEIGHT: 74 IN | WEIGHT: 185 LBS | DIASTOLIC BLOOD PRESSURE: 67 MMHG | OXYGEN SATURATION: 97 % | HEART RATE: 62 BPM

## 2025-05-15 DIAGNOSIS — E78.2 MIXED HYPERLIPIDEMIA: ICD-10-CM

## 2025-05-15 DIAGNOSIS — I35.8 AORTIC VALVE SCLEROSIS: ICD-10-CM

## 2025-05-15 DIAGNOSIS — C61 PROSTATE CANCER METASTATIC TO BONE: ICD-10-CM

## 2025-05-15 DIAGNOSIS — C79.51 PROSTATE CANCER METASTATIC TO BONE: ICD-10-CM

## 2025-05-15 DIAGNOSIS — I70.8 AORTO-ILIAC ATHEROSCLEROSIS: Primary | ICD-10-CM

## 2025-05-15 DIAGNOSIS — I70.0 AORTO-ILIAC ATHEROSCLEROSIS: Primary | ICD-10-CM

## 2025-05-15 DIAGNOSIS — I73.9 PAD (PERIPHERAL ARTERY DISEASE): ICD-10-CM

## 2025-05-15 DIAGNOSIS — I35.0 SEVERE CALCIFIC AORTIC VALVE STENOSIS: Primary | ICD-10-CM

## 2025-05-15 DIAGNOSIS — I35.0 SEVERE AORTIC STENOSIS: Primary | ICD-10-CM

## 2025-05-15 PROCEDURE — 74174 CTA ABD&PLVS W/CONTRAST: CPT | Mod: TC

## 2025-05-15 PROCEDURE — 25500020 PHARM REV CODE 255: Performed by: INTERNAL MEDICINE

## 2025-05-15 PROCEDURE — 99205 OFFICE O/P NEW HI 60 MIN: CPT | Mod: S$GLB,,, | Performed by: THORACIC SURGERY (CARDIOTHORACIC VASCULAR SURGERY)

## 2025-05-15 PROCEDURE — 74174 CTA ABD&PLVS W/CONTRAST: CPT | Mod: 26,,, | Performed by: STUDENT IN AN ORGANIZED HEALTH CARE EDUCATION/TRAINING PROGRAM

## 2025-05-15 PROCEDURE — 1126F AMNT PAIN NOTED NONE PRSNT: CPT | Mod: CPTII,S$GLB,, | Performed by: THORACIC SURGERY (CARDIOTHORACIC VASCULAR SURGERY)

## 2025-05-15 PROCEDURE — 71275 CT ANGIOGRAPHY CHEST: CPT | Mod: 26,,, | Performed by: STUDENT IN AN ORGANIZED HEALTH CARE EDUCATION/TRAINING PROGRAM

## 2025-05-15 PROCEDURE — 1159F MED LIST DOCD IN RCRD: CPT | Mod: CPTII,S$GLB,, | Performed by: THORACIC SURGERY (CARDIOTHORACIC VASCULAR SURGERY)

## 2025-05-15 PROCEDURE — 3078F DIAST BP <80 MM HG: CPT | Mod: CPTII,S$GLB,, | Performed by: THORACIC SURGERY (CARDIOTHORACIC VASCULAR SURGERY)

## 2025-05-15 PROCEDURE — 99999 PR PBB SHADOW E&M-EST. PATIENT-LVL III: CPT | Mod: PBBFAC,,, | Performed by: INTERNAL MEDICINE

## 2025-05-15 PROCEDURE — 1160F RVW MEDS BY RX/DR IN RCRD: CPT | Mod: CPTII,S$GLB,, | Performed by: THORACIC SURGERY (CARDIOTHORACIC VASCULAR SURGERY)

## 2025-05-15 PROCEDURE — 99999 PR PBB SHADOW E&M-EST. PATIENT-LVL III: CPT | Mod: PBBFAC,,, | Performed by: THORACIC SURGERY (CARDIOTHORACIC VASCULAR SURGERY)

## 2025-05-15 PROCEDURE — 3074F SYST BP LT 130 MM HG: CPT | Mod: CPTII,S$GLB,, | Performed by: THORACIC SURGERY (CARDIOTHORACIC VASCULAR SURGERY)

## 2025-05-15 RX ORDER — DIPHENHYDRAMINE HCL 50 MG
50 CAPSULE ORAL ONCE
Status: CANCELLED | OUTPATIENT
Start: 2025-05-15 | End: 2025-05-15

## 2025-05-15 RX ADMIN — IOHEXOL 100 ML: 350 INJECTION, SOLUTION INTRAVENOUS at 12:05

## 2025-05-15 NOTE — PROGRESS NOTES
If you need to change the date of your procedure, please call  Katrin ONEAL [phone]  CALL THE OFFICE IF YOU HAVE ANY MEDICATIONS CHANGES BEFORE A PROCEDURE.    Pre op labs now.     OUTPATIENT CATHETERIZATION INSTRUCTIONS     You have been scheduled for a procedure in the catheterization lab on  TOMORROW,  FRIDAY 5/16/25     Please report to the Cardiology Waiting Area on the Third floor of the hospital and check in at ARRIVAL TIME at 1015.   You will then be taken to the SSCU (Short Stay Cardiac Unit) and prepared for your procedure. Please be aware that this is not the time of your procedure but the time you are to arrive. The procedures are scheduled on an hourly basis; however, emergency cases take precedence over all other cases.         1. NOTHING TO EAT AFTER MIDNIGHT 12AM the morning of the procedure.   NO FOOD.  You may take your medications with small sips of water. SEE BELOW INSTRUCTIONS ON MEDICATIONS.     2.   continue daily medications.  Ok to take with water and continue with water only until angiogram check in.  SEE BELOW.    3.  Diabetics: NONE  IF TAKING OZEMPIC- THIS MUST BE HELD FOR ONE WEEK PRIOR TO PROCEDURE.    4. Heart Failure Patients: NONE        The procedure will take 1-2 hours to perform. After the procedure, you will return to SSCU on the third floor of the hospital. You will need to lie still (or keep your arm still) for the next 4 to 6 hours to minimize bleeding from the puncture site. Your family may remain in the room with you during this time.         You may be able to be discharged home that same afternoon if there is someone to drive you home and there were no complications. If you have one of the balloon, stent, or device procedures you may spend the night in the hospital. Your doctor will determine, based on your progress, the date and time of your discharge. The results of your procedure will be discussed with you before you are discharged. Any further testing or  procedures will be scheduled for you either before you leave or you will be called with these appointments.   YOU WILL NOT BE ABLE TO DRIVE HOME  THE DAY OF THE PROCEDURE.      If you should have any questions, concerns, or please call Katrin [phone]        Special Instructions:  IMPORTANT TO HYDRATE  Drink plenty of water the day before and the day after the procedure to flush your kidneys.     Continue daily medications.     IMPORTANT:  HOLD The following medications as directed:    HOLD FLUID PILLS -  meds HYDRODIUREL  -  take this med after the procedure  HOLD VITAMINS to prevent nausea        THE ABOVE INSTRUCTIONS WERE GIVEN TO THE PATIENT VERBALLY AND THEY VERBALIZED UNDERSTANDING.  THEY DO NOT REQUIRE ANY SPECIAL NEEDS AND DO NOT HAVE ANY LEARNING BARRIERS.          Directions for Reporting to Cardiology Waiting Area in the Hospital  If you park in the Parking Garage:  Take elevators to the1st floor of the parking garage.  Continue past the gift shop, coffee shop, and piano.  Take a right and go to the gold elevators. (Elevator B)  Take the elevator to the 3rd floor.  Follow the arrow on the sign on the wall that says Cath Lab Registration/EP Lab Registration.  Follow the long hallway all the way around until you come to a big open area.  This is the registration area.  Check in at Reception Desk.     OR     If family is dropping you off:  Have them drop you off at the front of the Hospital under the green overhang.  Enter through the doors and take a right.  Take the 'E' elevators to the 3rd floor Cardiology Waiting Area.  Check in at the Reception Desk in the waiting room.

## 2025-05-15 NOTE — H&P (VIEW-ONLY)
INTERVENTIONAL CARDIOLOGY CLINIC  HEART VALVE CENTER    REFERRING PHYSICIAN: Dr Carreno    Dutch Olivier is a 79 y.o. male referred by Dr Carreno for evaluation of severe AS (NYHA Class II sx).    The patient has undergone the following TAVR work-up:   ECHO (Date 4.22.25): KAMRAN= 0.9 cm2, MG= 44mmHg, Peak Maxime= 4.2 m/s, EF= 60%.   LHC (): Pending. On CTA has a heavily calcified mid LAD   STS: 5  Frailty: 1/4   Iliacs are >4 on R and > 5 on L (Can be dilated with a 7 mm balloon  LVOT area by CTA is 6.01 cm2 (32 mm X 24 mm) and Avg Diameter is 27.7 per my independent review of images on [employer].  Incidental findings on CT: Pending read  CT Surgery risk assessment: To see Dr Barlow today  Rhythm issues: None  PFTs: Not done.  KCCQ/5 meter walk: Done in clinic  Comorbidities: PAD, DLD, HTN      Dutch Olivier is a 29 mm  Kathia S3 valve candidate via RTF access with balloon dilation of the REIA or shockwave balloon.       History of Present Illness    CHIEF COMPLAINT:  Presents for evaluation and management of significant aortic stenosis, referred by Dr. Brown.    HPI:  Mr. Olivier, an 79-year-old male, was diagnosed with aortic valve issues 5-6 months ago. Initially described as mild to moderate, with instructions to follow up in 4 months. Upon re-evaluation 4-5 months later, the condition had worsened. He reports fatigue and dyspnea, which he partially attributes to his pre-existing COPD. He can ascend 1 flight of stairs but experiences dyspnea. He indicates this symptom has been longstanding due to COPD, but notes increased congestion in the last 1 to 5 months. He also reports leg pain during ambulation, potentially related to arterial stenosis.    He denies prior cardiac catheterization or coronary angiogram.    CARDIAC HISTORY:  Echo: EF 60-65%, aortic valve trileaflet, moderate to significant aortic valve sclerosis, significant stenosis with valvar area 0.9 cm², aortic valve peak velocity 4.2 m/second, mean  gradient 44 mmHg, dimensionless index 0.17  Cardiac CT: annulus 6.01 cm², average diameter 27.7 mm (32x24 mm), normal size aorta  Aortoiliac CTA: diffuse aortoiliac atherosclerosis, several calcific lesions in distal aorta, iliac arteries and common femoral arteries, right common femoral artery with several narrowed spots (max diameter 4 mm) with circumferential calcium EKG 03/20/2025: sinus bradycardia, voltage criteria for LVH,  ms without LBBB or RBBB    MEDICAL HISTORY:  Mr. Olivier has a history of severe aortic stenosis, prostate cancer that has metastasized to the bone, and COPD.           PAST MEDICAL HISTORY  Past Medical History:   Diagnosis Date    Alcohol abuse, daily use     8/2024 stopped    Amblyopia     OS    Aortic valve stenosis     Cancer     melanoma, prostate    Cataract     Complication of anesthesia     says he sometimes is slow to awaken    COPD (chronic obstructive pulmonary disease)     no oxygen, no inhalers, or nebulizers    Diverticulosis     Gross hematuria     History of colonic polyps     History of malignant melanoma 01/01/2011    right ear, had chemo//Dr Bessett    HTN (hypertension)     Hyperlipemia     Kidney stone         PAST SURGICAL HISTORY  Past Surgical History:   Procedure Laterality Date    cystolithopaxy      CYSTOSCOPIC LITHOLAPAXY N/A 3/25/2025    Procedure: CYSTOLITHOLAPAXY - with Thulium Laser - Bladder Stone;  Surgeon: EZ Kwan MD;  Location: [facility];  Service: Urology;  Laterality: N/A;    EXTERNAL EAR SURGERY  01/01/2011    melanoma removed right ear    HERNIA REPAIR      RIH    PORTACATH PLACEMENT  01/01/2011    later removed    PROSTATE SURGERY  01/01/2003    prostatectomy       SOCIAL HISTORY  TOBACCO: Denies    REVIEW OF SYSTEMS  Non contributory, relevant information discussed in HPI    STUDIES  I independently reviewed the following studies and my interpretation is reflected in the plan:  CTA  Echocardiogram      PHYSICAL EXAM  Physical  Exam    Assessment & Plan    I70.0, I70.8 Aorto-iliac atherosclerosis  E78.2 Mixed hyperlipidemia  C61, C79.51 Prostate cancer metastatic to bone  I73.9 PAD (peripheral artery disease)    Severe aortic stenosis: valve area 0.9 cm², peak velocity 4.2 m/s, mean gradient 44 mmHg.  CT angiogram shows diffuse aortoiliac atherosclerosis; right common femoral artery narrowed (max diameter 4 mm) with circumferential calcium.  Plan for TAVR via transfemoral approach as primary option, transaxillary as backup.  Open heart surgery less favorable given age (79).    PLAN SUMMARY:  - Referred to Dr. Mistry (cardiac surgeon) for TAVR evaluation  - CT angiogram of aortoiliac and femoral arteries to assess access for TAVR  - Coronary angiogram scheduled for the following day  - TAVR procedure explained using visual aids  - Follow-up for TAVR tentatively scheduled for May 27th, pending coronary angiogram results    AORTO-ILIAC ATHEROSCLEROSIS:  - CT angiogram of aortoiliac and femoral arteries to assess access for TAVR.    LIFESTYLE CHANGES:  - Explained TAVR procedure using visual aids, including valve structure and deployment process.  - Discussed natural history of severe aortic stenosis: 50% mortality at 2 years without intervention.  - Explained new valve should improve shortness of breath and fatigue, distinguishing from COPD symptoms.  - Discussed potential need for temporary pacing during procedure.  - Coronary angiogram scheduled for the following day.  - Referred to Dr. Mistry (cardiac surgeon) for TAVR evaluation.  - Follow up for TAVR tentatively scheduled for May 27th, pending results of coronary angiogram.  - One night hospital stay expected post-TAVR; possible two-night stay if pacemaker needed.  - Mr. Olivier to arrange for a ride home after the angiogram procedure.         Plan  Cardiac catheterization with probable PCI.   Antiplatelets: ASA  Access: Right common femoral, will do an angiogram to confirm suitability  for TF access  Catheters: JL4, JR4  Pt is a JAKUB candidate and understands the importance of taking plavix for at least one year in ACS cases and 6 months in stable CAD. The patient understands that in case of receiving a drug coated stent the failure to comply with dual anti-platelet therapy as prescribed is likely to result in stent clothing, heart attack and death.   The risks, benefits, and alternatives of coronary vascular angiography and possible intervention were discussed with the patient. All questions were answered and informed consent was obtained. I had a detailed discussion with the patient regarding risk of stroke, MI, bleeding access site complications including limb loss, allergy, kidney failure including dialysis and death.  The patient understands the risks and benefits and wishes to go ahead with the procedure.  CSHA Clinical Frailty Scale: Well  All patient's questions were answered          Gume Renteria MD [facility]  Interventional Cardiology  Structural/Valvular heart disease  [phone]    This note was generated with the assistance of ambient listening technology. Verbal consent was obtained by the patient and accompanying visitor(s) for the recording of patient appointment to facilitate this note. I attest to having reviewed and edited the generated note for accuracy, though some syntax or spelling errors may persist. Please contact the author of this note for any clarification.

## 2025-05-15 NOTE — PROGRESS NOTES
INTERVENTIONAL CARDIOLOGY CLINIC  HEART VALVE CENTER    REFERRING PHYSICIAN: Dr Carreno    Dutch Olivier is a 79 y.o. male referred by Dr Carreno for evaluation of severe AS (NYHA Class II sx).    The patient has undergone the following TAVR work-up:   ECHO (Date 4.22.25): KAMRAN= 0.9 cm2, MG= 44mmHg, Peak Maxime= 4.2 m/s, EF= 60%.   LHC (): Pending. On CTA has a heavily calcified mid LAD   STS: 5  Frailty: 1/4   Iliacs are >4 on R and > 5 on L (Can be dilated with a 7 mm balloon  LVOT area by CTA is 6.01 cm2 (32 mm X 24 mm) and Avg Diameter is 27.7 per my independent review of images on [employer].  Incidental findings on CT: Pending read  CT Surgery risk assessment: To see Dr Barlow today  Rhythm issues: None  PFTs: Not done.  KCCQ/5 meter walk: Done in clinic  Comorbidities: PAD, DLD, HTN      Dutch Olivier is a 29 mm  Kathia S3 valve candidate via RTF access with balloon dilation of the REIA or shockwave balloon.       History of Present Illness    CHIEF COMPLAINT:  Presents for evaluation and management of significant aortic stenosis, referred by Dr. Brown.    HPI:  Mr. Olivier, an 79-year-old male, was diagnosed with aortic valve issues 5-6 months ago. Initially described as mild to moderate, with instructions to follow up in 4 months. Upon re-evaluation 4-5 months later, the condition had worsened. He reports fatigue and dyspnea, which he partially attributes to his pre-existing COPD. He can ascend 1 flight of stairs but experiences dyspnea. He indicates this symptom has been longstanding due to COPD, but notes increased congestion in the last 1 to 5 months. He also reports leg pain during ambulation, potentially related to arterial stenosis.    He denies prior cardiac catheterization or coronary angiogram.    CARDIAC HISTORY:  Echo: EF 60-65%, aortic valve trileaflet, moderate to significant aortic valve sclerosis, significant stenosis with valvar area 0.9 cm², aortic valve peak velocity 4.2 m/second, mean  gradient 44 mmHg, dimensionless index 0.17  Cardiac CT: annulus 6.01 cm², average diameter 27.7 mm (32x24 mm), normal size aorta  Aortoiliac CTA: diffuse aortoiliac atherosclerosis, several calcific lesions in distal aorta, iliac arteries and common femoral arteries, right common femoral artery with several narrowed spots (max diameter 4 mm) with circumferential calcium EKG 03/20/2025: sinus bradycardia, voltage criteria for LVH,  ms without LBBB or RBBB    MEDICAL HISTORY:  Mr. Olivier has a history of severe aortic stenosis, prostate cancer that has metastasized to the bone, and COPD.           PAST MEDICAL HISTORY  Past Medical History:   Diagnosis Date    Alcohol abuse, daily use     8/2024 stopped    Amblyopia     OS    Aortic valve stenosis     Cancer     melanoma, prostate    Cataract     Complication of anesthesia     says he sometimes is slow to awaken    COPD (chronic obstructive pulmonary disease)     no oxygen, no inhalers, or nebulizers    Diverticulosis     Gross hematuria     History of colonic polyps     History of malignant melanoma 01/01/2011    right ear, had chemo//Dr Bessett    HTN (hypertension)     Hyperlipemia     Kidney stone         PAST SURGICAL HISTORY  Past Surgical History:   Procedure Laterality Date    cystolithopaxy      CYSTOSCOPIC LITHOLAPAXY N/A 3/25/2025    Procedure: CYSTOLITHOLAPAXY - with Thulium Laser - Bladder Stone;  Surgeon: ZE Kwan MD;  Location: [facility];  Service: Urology;  Laterality: N/A;    EXTERNAL EAR SURGERY  01/01/2011    melanoma removed right ear    HERNIA REPAIR      RIH    PORTACATH PLACEMENT  01/01/2011    later removed    PROSTATE SURGERY  01/01/2003    prostatectomy       SOCIAL HISTORY  TOBACCO: Denies    REVIEW OF SYSTEMS  Non contributory, relevant information discussed in HPI    STUDIES  I independently reviewed the following studies and my interpretation is reflected in the plan:  CTA  Echocardiogram      PHYSICAL EXAM  Physical  Exam    Assessment & Plan    I70.0, I70.8 Aorto-iliac atherosclerosis  E78.2 Mixed hyperlipidemia  C61, C79.51 Prostate cancer metastatic to bone  I73.9 PAD (peripheral artery disease)    Severe aortic stenosis: valve area 0.9 cm², peak velocity 4.2 m/s, mean gradient 44 mmHg.  CT angiogram shows diffuse aortoiliac atherosclerosis; right common femoral artery narrowed (max diameter 4 mm) with circumferential calcium.  Plan for TAVR via transfemoral approach as primary option, transaxillary as backup.  Open heart surgery less favorable given age (79).    PLAN SUMMARY:  - Referred to Dr. Mistry (cardiac surgeon) for TAVR evaluation  - CT angiogram of aortoiliac and femoral arteries to assess access for TAVR  - Coronary angiogram scheduled for the following day  - TAVR procedure explained using visual aids  - Follow-up for TAVR tentatively scheduled for May 27th, pending coronary angiogram results    AORTO-ILIAC ATHEROSCLEROSIS:  - CT angiogram of aortoiliac and femoral arteries to assess access for TAVR.    LIFESTYLE CHANGES:  - Explained TAVR procedure using visual aids, including valve structure and deployment process.  - Discussed natural history of severe aortic stenosis: 50% mortality at 2 years without intervention.  - Explained new valve should improve shortness of breath and fatigue, distinguishing from COPD symptoms.  - Discussed potential need for temporary pacing during procedure.  - Coronary angiogram scheduled for the following day.  - Referred to Dr. Mistry (cardiac surgeon) for TAVR evaluation.  - Follow up for TAVR tentatively scheduled for May 27th, pending results of coronary angiogram.  - One night hospital stay expected post-TAVR; possible two-night stay if pacemaker needed.  - Mr. Olivier to arrange for a ride home after the angiogram procedure.         Plan  Cardiac catheterization with probable PCI.   Antiplatelets: ASA  Access: Right common femoral, will do an angiogram to confirm suitability  for TF access  Catheters: JL4, JR4  Pt is a JAKUB candidate and understands the importance of taking plavix for at least one year in ACS cases and 6 months in stable CAD. The patient understands that in case of receiving a drug coated stent the failure to comply with dual anti-platelet therapy as prescribed is likely to result in stent clothing, heart attack and death.   The risks, benefits, and alternatives of coronary vascular angiography and possible intervention were discussed with the patient. All questions were answered and informed consent was obtained. I had a detailed discussion with the patient regarding risk of stroke, MI, bleeding access site complications including limb loss, allergy, kidney failure including dialysis and death.  The patient understands the risks and benefits and wishes to go ahead with the procedure.  CSHA Clinical Frailty Scale: Well  All patient's questions were answered          Gume Renteria MD [facility]  Interventional Cardiology  Structural/Valvular heart disease  [phone]    This note was generated with the assistance of ambient listening technology. Verbal consent was obtained by the patient and accompanying visitor(s) for the recording of patient appointment to facilitate this note. I attest to having reviewed and edited the generated note for accuracy, though some syntax or spelling errors may persist. Please contact the author of this note for any clarification.

## 2025-05-16 ENCOUNTER — TELEPHONE (OUTPATIENT)
Dept: ADMINISTRATIVE | Facility: CLINIC | Age: 80
End: 2025-05-16
Payer: MEDICARE

## 2025-05-16 ENCOUNTER — HOSPITAL ENCOUNTER (OUTPATIENT)
Facility: HOSPITAL | Age: 80
Discharge: HOME | End: 2025-05-16
Attending: INTERNAL MEDICINE | Admitting: INTERNAL MEDICINE
Payer: MEDICARE

## 2025-05-16 VITALS
OXYGEN SATURATION: 95 % | SYSTOLIC BLOOD PRESSURE: 127 MMHG | HEART RATE: 50 BPM | WEIGHT: 185 LBS | HEIGHT: 74 IN | TEMPERATURE: 98 F | DIASTOLIC BLOOD PRESSURE: 56 MMHG | BODY MASS INDEX: 23.74 KG/M2 | RESPIRATION RATE: 18 BRPM

## 2025-05-16 DIAGNOSIS — Z01.818 PREOPERATIVE TESTING: ICD-10-CM

## 2025-05-16 DIAGNOSIS — I35.0 SEVERE AORTIC STENOSIS: ICD-10-CM

## 2025-05-16 LAB
CREAT SERPL-MCNC: 1.1 MG/DL (ref 0.5–1.4)
OHS QRS DURATION: 100 MS
OHS QTC CALCULATION: 410 MS
SAMPLE: NORMAL

## 2025-05-16 PROCEDURE — 93454 CORONARY ARTERY ANGIO S&I: CPT | Mod: 26,,, | Performed by: INTERNAL MEDICINE

## 2025-05-16 PROCEDURE — C1894 INTRO/SHEATH, NON-LASER: HCPCS | Performed by: INTERNAL MEDICINE

## 2025-05-16 PROCEDURE — 93010 ELECTROCARDIOGRAM REPORT: CPT | Mod: ,,, | Performed by: INTERNAL MEDICINE

## 2025-05-16 PROCEDURE — 93005 ELECTROCARDIOGRAM TRACING: CPT

## 2025-05-16 PROCEDURE — 99152 MOD SED SAME PHYS/QHP 5/>YRS: CPT | Mod: ,,, | Performed by: INTERNAL MEDICINE

## 2025-05-16 PROCEDURE — 25000003 PHARM REV CODE 250: Performed by: STUDENT IN AN ORGANIZED HEALTH CARE EDUCATION/TRAINING PROGRAM

## 2025-05-16 PROCEDURE — 93454 CORONARY ARTERY ANGIO S&I: CPT | Performed by: INTERNAL MEDICINE

## 2025-05-16 PROCEDURE — 25500020 PHARM REV CODE 255: Performed by: INTERNAL MEDICINE

## 2025-05-16 PROCEDURE — 63600175 PHARM REV CODE 636 W HCPCS: Performed by: INTERNAL MEDICINE

## 2025-05-16 PROCEDURE — 99152 MOD SED SAME PHYS/QHP 5/>YRS: CPT | Performed by: INTERNAL MEDICINE

## 2025-05-16 PROCEDURE — 25000003 PHARM REV CODE 250: Performed by: INTERNAL MEDICINE

## 2025-05-16 PROCEDURE — C1769 GUIDE WIRE: HCPCS | Performed by: INTERNAL MEDICINE

## 2025-05-16 PROCEDURE — 27201423 OPTIME MED/SURG SUP & DEVICES STERILE SUPPLY: Performed by: INTERNAL MEDICINE

## 2025-05-16 PROCEDURE — 99153 MOD SED SAME PHYS/QHP EA: CPT | Performed by: INTERNAL MEDICINE

## 2025-05-16 RX ORDER — HEPARIN SOD,PORCINE/0.9 % NACL 1000/500ML
INTRAVENOUS SOLUTION INTRAVENOUS
Status: DISCONTINUED | OUTPATIENT
Start: 2025-05-16 | End: 2025-05-16 | Stop reason: HOSPADM

## 2025-05-16 RX ORDER — FENTANYL CITRATE 50 UG/ML
INJECTION, SOLUTION INTRAMUSCULAR; INTRAVENOUS
Status: DISCONTINUED | OUTPATIENT
Start: 2025-05-16 | End: 2025-05-16 | Stop reason: HOSPADM

## 2025-05-16 RX ORDER — ONDANSETRON 8 MG/1
8 TABLET, ORALLY DISINTEGRATING ORAL EVERY 8 HOURS PRN
Status: DISCONTINUED | OUTPATIENT
Start: 2025-05-16 | End: 2025-05-16 | Stop reason: HOSPADM

## 2025-05-16 RX ORDER — CEFAZOLIN SODIUM 1 G/3ML
INJECTION, POWDER, FOR SOLUTION INTRAMUSCULAR; INTRAVENOUS
Status: DISCONTINUED | OUTPATIENT
Start: 2025-05-16 | End: 2025-05-16 | Stop reason: HOSPADM

## 2025-05-16 RX ORDER — MIDAZOLAM HYDROCHLORIDE 1 MG/ML
INJECTION INTRAMUSCULAR; INTRAVENOUS
Status: DISCONTINUED | OUTPATIENT
Start: 2025-05-16 | End: 2025-05-16 | Stop reason: HOSPADM

## 2025-05-16 RX ORDER — DIPHENHYDRAMINE HCL 50 MG
50 CAPSULE ORAL ONCE
Status: COMPLETED | OUTPATIENT
Start: 2025-05-16 | End: 2025-05-16

## 2025-05-16 RX ORDER — ONDANSETRON HYDROCHLORIDE 2 MG/ML
INJECTION, SOLUTION INTRAVENOUS
Status: DISCONTINUED | OUTPATIENT
Start: 2025-05-16 | End: 2025-05-16 | Stop reason: HOSPADM

## 2025-05-16 RX ORDER — NITROGLYCERIN 20 MG/100ML
INJECTION INTRAVENOUS
Status: DISCONTINUED | OUTPATIENT
Start: 2025-05-16 | End: 2025-05-16 | Stop reason: HOSPADM

## 2025-05-16 RX ORDER — SODIUM CHLORIDE 9 MG/ML
INJECTION, SOLUTION INTRAVENOUS CONTINUOUS
Status: ACTIVE | OUTPATIENT
Start: 2025-05-16 | End: 2025-05-16

## 2025-05-16 RX ORDER — LIDOCAINE HYDROCHLORIDE 20 MG/ML
INJECTION, SOLUTION EPIDURAL; INFILTRATION; INTRACAUDAL; PERINEURAL
Status: DISCONTINUED | OUTPATIENT
Start: 2025-05-16 | End: 2025-05-16 | Stop reason: HOSPADM

## 2025-05-16 RX ORDER — ACETAMINOPHEN 325 MG/1
650 TABLET ORAL EVERY 4 HOURS PRN
Status: DISCONTINUED | OUTPATIENT
Start: 2025-05-16 | End: 2025-05-16 | Stop reason: HOSPADM

## 2025-05-16 RX ADMIN — DIPHENHYDRAMINE HYDROCHLORIDE 50 MG: 50 CAPSULE ORAL at 11:05

## 2025-05-16 RX ADMIN — SODIUM CHLORIDE: 0.9 INJECTION, SOLUTION INTRAVENOUS at 01:05

## 2025-05-16 NOTE — PLAN OF CARE
Received pt to SSCU from home.  AAO x 4. Denies pain or discomfort. Respirations even and unlabored. No distress noted. Pt stable.  Admit assessment complete. IV x 2 placed.  Pt oriented to room and call bell placed within reach.

## 2025-05-16 NOTE — NURSING
Received report from Melina OAKES. Patient s/p [facility], AAOx4.   VSS, no c/o pain or discomfort at this time, resp even and unlabored. Gauze/tegaderm dressing to R groin is CDI. No active bleeding. No hematoma noted.   Post procedure protocol reviewed with patient. Understanding verbalized. Nurse call bell within reach.     Tele maintained.

## 2025-05-16 NOTE — BRIEF OP NOTE
Brief Operative Note:    : Gume Blood MD     Referring Physician: Gume Blood     All Operators: Surgeon(s):  Gume Blood, Terry Montero Jr., MD     Preoperative Diagnosis: Severe aortic stenosis [I35.0]     Postop Diagnosis: Severe aortic stenosis [I35.0]    Treatments/Procedures: Procedure(s) (LRB):  ANGIOGRAM, CORONARY ARTERY (N/A)    Findings:Moderate coronary disease is present. See catheterization report for full details.    Non-obstructing CAD and severe iliofemoral disease    Estimated Blood loss: 20 cc    Specimens removed: No    Recommendations:   - Routine post-cath care as per orders  - IVF at 100 cc/hr for 4 hrs      Terry Pascual Jr

## 2025-05-16 NOTE — TELEPHONE ENCOUNTER
Mr. Olivier is compliant on pravastatin 40 mg hs. He has not had a lipid panel since 30 days after medication was initiated. New lipid panel needed to determine if dose is effective. No changes at this time.    ----- Message from VIOLETTE Hanley sent at 2025 10:23 AM CDT -----  Regarding: Order for YEHUDA OLIVIER    Patient Name: YEHUDA OLIVIER([id number])  Sex: Male  : 1945      PCP: DIO OCONNOR    Center: [facility]     Types of orders made on 2025: Activity, Diet, ECG, IV, Medications,                                       Nursing, Transfer    Order Date:5/15/2025  Ordering User:FRANCISCO J ROCHA [[id number]]  Attending Provider:Gume Blood MD [[id number]]  Authorizing Provider: Gume Blood MD [[id number]  Department:[facility] SHORT STAY CARDIAC UNIT[[id number]]    Order Specific Information  Order: Notify [facility] Team [Custom: [id number]]  Order #: [id number]: 1    Priority: Routine  Class: Hospital Performed    Associated Diagnoses      I35.0 Severe aortic stenosis    Released on: 2025 10:23 AM        Priority: Routine  Class: Hospital Performed    Associated Diagnoses      I35.0 Severe aortic stenosis    Released on: 2025 10:23 AM

## 2025-05-16 NOTE — HOSPITAL COURSE
Patient brought for planned LHC that was significant for non-obstructing CAD and severe PAD to [facility]. No intervention was performed, please see report for full details. The patient tolerated the procedure well and was instructed to continue home medications and present for attempted to [facility] TAVR. He was then discharged in stable condition.

## 2025-05-16 NOTE — DISCHARGE SUMMARY
[facility] - Short Stay Cardiac Unit  Interventional Cardiology  Discharge Summary      Patient Name: Dutch Olivier  MRN: [id number]  Admission Date: 5/16/2025  Hospital Length of Stay: 0 days  Discharge Date and Time: 05/16/2025 5:26 PM  Attending Physician: Gume Blood MD  Discharging Provider: Terry Pascual Jr, MD  Primary Care Physician: David Arreola MD    HPI:  No notes on file    Procedure(s) (LRB):  ANGIOGRAM, CORONARY ARTERY (N/A)     Indwelling Lines/Drains at time of discharge:  Lines/Drains/Airways       None                   Hospital Course:  Patient brought for planned LHC that was significant for non-obstructing CAD and severe PAD to [facility]. No intervention was performed, please see report for full details. The patient tolerated the procedure well and was instructed to continue home medications and present for attempted to [facility] TAVR. He was then discharged in stable condition.       Goals of Care Treatment Preferences:             Significant Diagnostic Studies: N/A    Pending Diagnostic Studies:       None          No new Assessment & Plan notes have been filed under this hospital service since the last note was generated.  Service: Interventional Cardiology      Discharged Condition: good    Follow Up:   Follow-up Information       Gume Blood MD Follow up.    Specialties: Interventional Cardiology, Cardiology  Why: As needed  Contact information:  [address]  [facility] [address] [phone]                           Patient Instructions:   No discharge procedures on file.  Medications:  Reconciled Home Medications:      Medication List        CONTINUE taking these medications      b complex vitamins tablet  Take 1 tablet by mouth once daily.     benazepriL 20 MG tablet  Commonly known as: LOTENSIN  Take 1 tablet (20 mg total) by mouth 2 (two) times daily.     bicalutamide 50 MG Tab  Commonly known as: CASODEX  TAKE 1 TABLET (50 MG TOTAL) BY MOUTH ONCE DAILY.      hydroCHLOROthiazide 12.5 MG Tab  Take 1 tablet (12.5 mg total) by mouth every morning.     multivitamin capsule  Take 1 capsule by mouth once daily.     pravastatin 40 MG tablet  Commonly known as: PRAVACHOL  Take 1 tablet (40 mg total) by mouth once daily.              Time spent on the discharge of patient: 20 minutes    Terry Pascual Jr, MD  Interventional Cardiology  [facility] - Short Stay Cardiac Unit

## 2025-05-17 NOTE — NURSING
Pt DC'd per orders.  DC paperwork reviewed w pt and daughter.  Pt verbalized DC instructions.    IV's removed. Tele DC'd.  Pt was able to drink, walk, eat, and urinate with no difficulties.    Pt being wheeled off unit per daughter in wheelchair.

## 2025-05-19 ENCOUNTER — EDUCATION (OUTPATIENT)
Dept: CARDIOLOGY | Facility: CLINIC | Age: 80
End: 2025-05-19
Payer: MEDICARE

## 2025-05-19 DIAGNOSIS — I35.8 AORTIC VALVE SCLEROSIS: Primary | ICD-10-CM

## 2025-05-19 DIAGNOSIS — I35.0 SEVERE AORTIC STENOSIS: Primary | ICD-10-CM

## 2025-05-19 RX ORDER — SODIUM CHLORIDE 0.9 % (FLUSH) 0.9 %
10 SYRINGE (ML) INJECTION
Status: SHIPPED | OUTPATIENT
Start: 2025-05-19

## 2025-05-19 RX ORDER — SODIUM CHLORIDE 9 MG/ML
INJECTION, SOLUTION INTRAVENOUS CONTINUOUS
OUTPATIENT
Start: 2025-05-19 | End: 2025-05-19

## 2025-05-19 RX ORDER — DIPHENHYDRAMINE HCL 50 MG
50 CAPSULE ORAL ONCE
OUTPATIENT
Start: 2025-05-19 | End: 2025-05-19

## 2025-05-19 NOTE — PROGRESS NOTES
TAVR DISCHARGE INSTRUCTIONS      1. General Post Op Instructions:  -   No lifting greater than 5 pounds for 5 days  -   No driving or operating heavy machinery for 5 days  -   You may shower as soon as you get home but no bathing or submerging in water       (lakes, pools, tub etc) for 1 week.                -   You may remove the dressing and replace with a band-aid.      -   Keep incision dry and clean.  No lotions, oils, or creams.  You may change the band-aid daily                   until a scab has formed over              -   You may feel a small lump in your groin area due to a closure device used after the procedure.                     The site may be black and blue or swollen and pink for a few days. If the site enlarges,                    becomes painful and/or starts to bleed,please call.               -    You may return to your regular activities as tolerated.        If you develop any fever, urinary tract infection, or any other signs of infection, please call our office immediately.    2.  Preventing infection on Your heart Valve:  -   DO NOT have any dental work, surgery, or any other elective procedures for 6 months.  -   Provide a copy of this card to your Dentist or Gastroenterologist to keep in your chart.  -  You DO need to take antibiotics prior to any routine dental cleaning, GI procedure (colonoscopy, endoscopy), (cystoscopy), or respiratory procedures (bronchoscopy).                -  You DO need antibiotics if you are having a procedure that requires cutting any infected area.              -  Your Dentist or Gastroenterologist will prescribe these for you prior to your appointment.        3.  Managing your heart Failure:  -    Weigh yourself daily and keep track of your weight  -    If you are on Lasix, continue to take it as prescribed.  -    If you gain 3 pounds overnight or 5 pounds over 5 days, double Lasix for 3 days or begin taking  "Lasix once a day for 3 days                   4.  Follow Up:  -   TAVR follow up protocol requires you to return for a one month and a one-year visit. We will schedule an echo, blood work  and an appointment to see a    member of our team.      -   Please continue to see your regular Cardiologist for all other issues.  -   You will receive an ID card from the  of your valve in 4-6 weeks.  Make a copy of the card, and keep it with you at all times.    5.  Discharge:  - If you have any questions or concerns after discharge, please do not hesitate to call our office and speak with a nurse at [phone]              - If you have any problems or concerns related to a Pacemaker or Event monitor please call the Arrhythmia department at [phone].             -  Please address any other concerns with your primary Cardiologist.                   -  We answer messages sent via the "[employer]" portal Monday - Friday 8am - 5pm.               -  Please do not send emergency messages through the portal.                  After hours and weekends, please call [phone] to speak to a Registered Nurse.             Insurance Authorization/Personal Assistance    The [facility] pre-service team will submit information to your insurance carrier for authorization.  Please note that we do not handle any insurance issues in our office.  If there are any financial obligations, such as co-pays, deductibles, or out of pocket fees, you will be contacted by a representative prior to your procedure.  It is the patient's responsibility to assure that their procedure is cleared in advance.  Our expert financial counselors are available to provide  patients with assistance for personalized cost estimates.  Financial counselors can be reached the following ways:    Pre Service Representatives:  [phone]  Financial Counselors:  [phone]  [employer] messaging - accessed via the patient portal  Live chat accessed through " "[email or web address]      FMLA/Disability Forms    ALL FMLA/Disability forms and claims are  processed through the Disability office.  All claims must be initiated through this office.  Once the forms are completed, they will be sent to the physician for signature.  Please allow 10 - 14 working days to have forms completed and returned to you.   You may reach this office in  the following ways:      Email:  [email or web address]  Fax:  [phone]  Phone:  [phone]    Release of Information and Medical Records      Our office will send a copy of any office notes or procedure notes to your referring provider.   ALL other medical records are released through the Medical Records office.  Phone:  [phone]  Fax:  [phone]    Miscellaneous Information    On-line information:  [email or web address]  Parking at [facility] is free and open 24/7  [facility] parking is located on the first floor of the garage and is available for a small fee Monday - Friday from 6a - 6 pm  [facility] located at [facility]:  [phone], or [email or web address]  If you need assistance with setting up or troubleshooting "MyChart" please call [phone]                                        "

## 2025-05-19 NOTE — PROGRESS NOTES
TRANSCATHETER AORTIC VALVE REPLACEMENT    For questions, concerns or rescheduling issues please call our office at [phone]      Your procedure has been scheduled on Wednesday, May 28, 2025.     Please report to the Cardiology Waiting Area on the Third floor of the hospital and check in at 6 :00 AM.   You will then be taken to the SSCU (Short Stay Cardiac Unit) and prepared for your procedure. Please be aware that this is not the time of your procedure but the time you are to arrive.     Preperations for your procedure:  Shower with Dial soap the night before and the morning of your procedure. Be sure to clean your groin area well.  Nothing to eat or drink after MIDNIGHT the night before.                                            You may take your regular morning medications with as much water or clear liquid as necessary.   Bring your cane and/or walker with you to the hospital if you routinely use one of these devices.  Bring CPAP/BiPAP, or oxygen if you are on oxygen at home.  Leave any jewelry and/or valuables at home  Remove all make-up and nail polish   You may wear your dentures, however, Anesthesia may require you to remove them.  Please bring case to keep them in if necessary.    Medications:  You may take your regular scheduled medications the morning of your procedure unless specified below:      If you take a weekly GLP-1 Inhibitor  You must be off that medication for one full week prior to your procedure.  Anesthesia will cancel your procedure if you do not hold this medication for a minimum of 7 days prior to procedure.       How long will the procedure take?  The procedure takes approximately three to four hours.  After the procedure, you will go to an ICU or the Cardiac Step Down Unit.  The decision will be made by the valve team.  You will be monitored closely for the next several hours and remain overnight.       When can I go home?  Your length of stay in the hospital, will be determined by  your physician.  Be prepared to stay 1-3 days.  You will be discharged when your physician thinks it is safe for you to go home.  You must have someone to drive you home when you are discharged. It is strongly advised that you have a responsible adult to stay with you for the first 24 hours after discharge.       Follow Up After Valve Replacement:  You will be scheduled for follow up in one month and one year with an echo, lab work, and a clinic visit.    If you are in a research trial, your follow up will be slightly different and according to the trial requirements.  You can follow up with your regular cardiologist regarding any other heart issues.      Call the office for any signs of infection ( fever, cough, pneumonia, urinary tract, etc.) We cannot implant a device in an infected patient.    DO NOT SCHEDULE ANY ELECTIVE PROCEDURES FOR 6 MONTHS AFTER TAVR.  THIS INCLUDES DENTAL WORK, COLONOSCOPY,ETC.                 Directions for Reporting to Cardiology Waiting Area in the Hospital    If you park in the Parking Garage:  Take elevators to the1st floor of the parking garage.  Continue past the gift shop, coffee shop, and piano.  Take Elevator B (gold elevators) to 3rd floor and follow signs to  Cath Lab Registration/EP Lab Registration.  Follow the long hallway all the way around until you come to the waiting area.  Check in at Reception desk.    OR    If family is dropping you off:  Have them drop you off at the front of the Hospital under the green overhang.  Enter through the doors and take a right.  Take the E elevators to the 3rd floor Cardiology Waiting Area.  Check in at the Reception Desk in the waiting room.    Wheelchairs are available on the first floor of the parking garage.                           Insurance Authorization/Personal Assistance    The [facility] pre-service team will submit information to your insurance carrier for authorization.  Please note that we do not handle any insurance issues  "in our office.  If there are any financial obligations, such as co-pays, deductibles, or out of pocket fees, you will be contacted by a representative prior to your procedure.  It is the patient's responsibility to assure that their procedure is cleared in advance.  Our expert financial counselors are available to provide  patients with assistance for personalized cost estimates.  Financial counselors can be reached the following ways:    Pre Service Representatives:  [phone]  Financial Counselors:  [phone]  [employer] messaging - accessed via the patient portal  Live chat accessed through [email or web address]      FMLA/Disability Forms    ALL FMLA/Disability forms and claims are  processed through the Disability office.  All claims must be initiated through this office.  Once the forms are completed, they will be sent to the physician for signature.  Please allow 10 - 14 working days to have forms completed and returned to you.   You may reach this office in  the following ways:      Email:  [email or web address]  Fax:  [phone]  Phone:  [phone]    Release of Information and Medical Records      Our office will send a copy of any office notes or procedure notes to your referring provider.   ALL other medical records are released through the Medical Records office.  Phone:  [phone]  Fax:  [phone]      Miscellaneous Information    On-line information:  [email or web address]    Parking at [facility] is free and open 24/7    [facility] parking is located on the first floor of the garage and is available for a small fee Monday - Friday from 6a - 6 pm    [facility] located at [facility]:  [phone], or [email or web address]    If you need assistance with setting up or troubleshooting "[employer]" please call [phone]                           "

## 2025-05-27 ENCOUNTER — TELEPHONE (OUTPATIENT)
Dept: CARDIOLOGY | Facility: CLINIC | Age: 80
End: 2025-05-27
Payer: MEDICARE

## 2025-05-27 ENCOUNTER — ANESTHESIA EVENT (OUTPATIENT)
Dept: MEDSURG UNIT | Facility: HOSPITAL | Age: 80
End: 2025-05-27

## 2025-05-27 RX ORDER — ONDANSETRON HYDROCHLORIDE 2 MG/ML
4 INJECTION, SOLUTION INTRAVENOUS DAILY PRN
Status: CANCELLED | OUTPATIENT
Start: 2025-05-27

## 2025-05-27 RX ORDER — FENTANYL CITRATE 50 UG/ML
25 INJECTION, SOLUTION INTRAMUSCULAR; INTRAVENOUS EVERY 5 MIN PRN
Refills: 0 | Status: CANCELLED | OUTPATIENT
Start: 2025-05-27

## 2025-05-27 RX ORDER — SODIUM CHLORIDE 0.9 % (FLUSH) 0.9 %
10 SYRINGE (ML) INJECTION
Status: CANCELLED | OUTPATIENT
Start: 2025-05-27

## 2025-05-27 RX ORDER — GLUCAGON 1 MG
1 KIT INJECTION
Status: CANCELLED | OUTPATIENT
Start: 2025-05-27

## 2025-05-27 NOTE — ANESTHESIA PREPROCEDURE EVALUATION
[facility]  Anesthesia Pre-Operative Evaluation         Patient Name: Dutch Olivier  YOB: 1945  MRN: [id number]    SUBJECTIVE:     Pre-operative evaluation for Procedure(s) (LRB):  REPLACEMENT, AORTIC VALVE, TRANSCATHETER (TAVR) (N/A)     05/27/2025    Dutch Olivier is a 79 y.o. male w/ a significant PMHx of HTN, HLD, COPD, prostate ca with mets to bone, PAD, severe AS.    Now presenting for the above procedure(s).       2D ECHO:  TTE:  Results for orders placed during the hospital encounter of 04/22/25    Echo    Interpretation Summary    Left Ventricle: The left ventricle is normal in size. Normal wall thickness. There is normal systolic function with a visually estimated ejection fraction of 60 - 65%. There is normal diastolic function.    Right Ventricle: The right ventricle is normal in size. Wall thickness is normal. Systolic function is normal.    Aortic Valve: The aortic valve is a trileaflet valve. There is moderate to severe aortic valve sclerosis. Severely restricted motion. There is severe stenosis. Aortic valve area by VTI is 0.9 cm². Aortic valve peak velocity is 4.2 m/s. Mean gradient is 44 mmHg. The dimensionless index is 0.17.    Pulmonary Artery: There is borderline elevated pulmonary hypertension. The estimated pulmonary artery systolic pressure is 34 mmHg.    IVC/SVC: Normal venous pressure at 3 mmHg.      JEWEL:  No results found for this or any previous visit.    Prev airway: 3/2025 easy mask, LMA 4      Problem List[1]    Review of patient's allergies indicates:   Allergen Reactions    Aspirin Hives       Current Outpatient Medications   Medication Instructions    b complex vitamins tablet 1 tablet, Daily    benazepriL (LOTENSIN) 20 mg, Oral, 2 times daily    bicalutamide (CASODEX) 50 mg, Oral, Daily    hydroCHLOROthiazide 12.5 mg, Oral, Every morning    multivitamin capsule 1 capsule, Daily    pravastatin (PRAVACHOL) 40 mg, Oral, Daily       Surgical  History  Past Surgical History:   Procedure Laterality Date    CORONARY ANGIOGRAPHY N/A 5/16/2025    Procedure: ANGIOGRAM, CORONARY ARTERY;  Surgeon: Gume Blood MD;  Location: [facility] CATH LAB;  Service: Cardiology;  Laterality: N/A;    cystolithopaxy      CYSTOSCOPIC LITHOLAPAXY N/A 3/25/2025    Procedure: CYSTOLITHOLAPAXY - with Thulium Laser - Bladder Stone;  Surgeon: EZ Kwan MD;  [facility];  Service: Urology;  Laterality: N/A;    EXTERNAL EAR SURGERY  01/01/2011    melanoma removed right ear    HERNIA REPAIR      RIH    PORTACATH PLACEMENT  01/01/2011    later removed    PROSTATE SURGERY  01/01/2003    prostatectomy       Social History:  Tobacco Use: High Risk (5/16/2025)    Patient History     Smoking Tobacco Use: Every Day     Smokeless Tobacco Use: Never     Passive Exposure: Current     Alcohol Use: Not on file         OBJECTIVE:     Vital Signs Range (Last 24H):         Significant Labs:  Lab Results   Component Value Date    WBC 9.23 05/15/2025    HGB 13.2 (L) 05/15/2025    HCT 40.8 05/15/2025     05/15/2025    INR 1.0 03/20/2025       Lab Results   Component Value Date     05/15/2025    K 4.3 05/15/2025     05/15/2025    CREATININE 0.9 05/15/2025    BUN 20 05/15/2025    CO2 27 05/15/2025       Lab Results   Component Value Date    TSH 0.721 09/13/2023    HGBA1C 5.1 10/08/2024       EKG:   Results for orders placed or performed during the hospital encounter of 05/16/25   EKG 12-lead    Collection Time: 05/16/25 10:56 AM   Result Value Ref Range    QRS Duration 100 ms    [facility] QTC Calculation 410 ms    Narrative    Test Reason : Z01.818,    Vent. Rate :  48 BPM     Atrial Rate :  48 BPM     P-R Int : 178 ms          QRS Dur : 100 ms      QT Int : 460 ms       P-R-T Axes :  75  29  63 degrees    QTcB Int : 410 ms    Sinus bradycardia  Minimal voltage criteria for LVH, may be normal variant ( Sokolow-Wallis )  Borderline Abnormal ECG  When compared with ECG of  20-Mar-2025 10:19,  No significant change was found  Confirmed by Jolly Cruz ([id number]) on 5/16/2025 1:16:44 PM    Referred By: MARCIE LOPEZ           Confirmed By: Jolly Cruz       ASSESSMENT/PLAN:          Pre-op Assessment    I have reviewed the Patient Summary Reports.     I have reviewed the Nursing Notes. I have reviewed the NPO Status.   I have reviewed the Medications.     Review of Systems  Social:  Former Smoker       Hematology/Oncology:       -- Anemia:                    --  Cancer in past history:                 Oncology Comments: prostate     EENT/Dental:  EENT/Dental Normal           Cardiovascular:     Hypertension Valvular problems/Murmurs, AS   Denies CAD.    Denies CABG/stent.  Denies Dysrhythmias.    Denies CHF.    hyperlipidemia                               Pulmonary:   COPD                     Hepatic/GI:  Hepatic/GI Normal                    Musculoskeletal:  Musculoskeletal Normal                Neurological:  Neurology Normal                                      Endocrine:  Endocrine Normal            Dermatological:  Skin Normal    Psych:  Psychiatric History                     Anesthesia Plan  Type of Anesthesia, risks & benefits discussed:    Anesthesia Type: Gen Natural Airway  Intra-op Monitoring Plan: Standard ASA Monitors and Art Line  Post Op Pain Control Plan: multimodal analgesia and IV/PO Opioids PRN  Induction:  IV  Day of Surgery Review of History & Physical: H&P Update referred to the surgeon/provider.    Ready For Surgery From Anesthesia Perspective.     .           [1]   Patient Active Problem List  Diagnosis    Hyperlipemia    COPD exacerbation    Essential hypertension    Erectile dysfunction    History of carcinoma in situ of prostate    History of malignant melanoma    Tobacco use disorder    Hernia, inguinal    Glucose intolerance (impaired glucose tolerance)    Prostate cancer metastatic to bone    Aorto-iliac atherosclerosis    Aortic valve sclerosis     Heart murmur    Pulmonary hypertension    Severe calcific aortic valve stenosis    Prostate cancer    Mild mitral regurgitation    Bladder stone    PAD (peripheral artery disease)

## 2025-05-27 NOTE — TELEPHONE ENCOUNTER
Spoke to patient and informed him that time of arrival for TAVR tomorrow has been changed to 10am.  Patient verbalized understanding of new time and that all other instructions remain same

## 2025-05-28 ENCOUNTER — HOSPITAL ENCOUNTER (INPATIENT)
Facility: HOSPITAL | Age: 80
LOS: 7 days | Discharge: HOME | DRG: 266 | End: 2025-06-04
Attending: INTERNAL MEDICINE | Admitting: INTERNAL MEDICINE
Payer: MEDICARE

## 2025-05-28 ENCOUNTER — TELEPHONE (OUTPATIENT)
Dept: ADMINISTRATIVE | Facility: CLINIC | Age: 80
End: 2025-05-28
Payer: MEDICARE

## 2025-05-28 ENCOUNTER — ANESTHESIA (OUTPATIENT)
Dept: MEDSURG UNIT | Facility: HOSPITAL | Age: 80
End: 2025-05-28

## 2025-05-28 ENCOUNTER — ANESTHESIA EVENT (OUTPATIENT)
Dept: MEDSURG UNIT | Facility: HOSPITAL | Age: 80
End: 2025-05-28
Payer: MEDICARE

## 2025-05-28 DIAGNOSIS — Z01.818 PRE-OP TESTING: ICD-10-CM

## 2025-05-28 DIAGNOSIS — Z95.3 S/P TAVR (TRANSCATHETER AORTIC VALVE REPLACEMENT): ICD-10-CM

## 2025-05-28 DIAGNOSIS — R00.1 BRADYCARDIA: ICD-10-CM

## 2025-05-28 DIAGNOSIS — T78.2XXA ANAPHYLAXIS: Primary | ICD-10-CM

## 2025-05-28 DIAGNOSIS — J96.01 ACUTE HYPOXIC RESPIRATORY FAILURE: ICD-10-CM

## 2025-05-28 DIAGNOSIS — I35.0 SEVERE AORTIC STENOSIS: ICD-10-CM

## 2025-05-28 DIAGNOSIS — F17.200 TOBACCO DEPENDENCY: ICD-10-CM

## 2025-05-28 DIAGNOSIS — I35.0 SEVERE AORTIC STENOSIS: Primary | ICD-10-CM

## 2025-05-28 LAB
ANION GAP (OHS): 10 MMOL/L (ref 8–16)
BILIRUB UR QL STRIP.AUTO: NEGATIVE
BNP SERPL-MCNC: 56 PG/ML (ref 0–99)
BUN SERPL-MCNC: 19 MG/DL (ref 8–23)
CALCIUM SERPL-MCNC: 9.5 MG/DL (ref 8.7–10.5)
CHLORIDE SERPL-SCNC: 109 MMOL/L (ref 95–110)
CLARITY UR: CLEAR
CO2 SERPL-SCNC: 26 MMOL/L (ref 23–29)
COLOR UR AUTO: YELLOW
CREAT SERPL-MCNC: 0.9 MG/DL (ref 0.5–1.4)
ERYTHROCYTE [DISTWIDTH] IN BLOOD BY AUTOMATED COUNT: 13.1 % (ref 11.5–14.5)
GFR SERPLBLD CREATININE-BSD FMLA CKD-EPI: >60 ML/MIN/1.73/M2
GLUCOSE SERPL-MCNC: 102 MG/DL (ref 70–110)
GLUCOSE UR QL STRIP: ABNORMAL
HCT VFR BLD AUTO: 37.9 % (ref 40–54)
HGB BLD-MCNC: 12.5 GM/DL (ref 14–18)
HGB UR QL STRIP: NEGATIVE
HOLD SPECIMEN: NORMAL
INDIRECT COOMBS: NORMAL
KETONES UR QL STRIP: NEGATIVE
LEUKOCYTE ESTERASE UR QL STRIP: NEGATIVE
MCH RBC QN AUTO: 29.9 PG (ref 27–31)
MCHC RBC AUTO-ENTMCNC: 33 G/DL (ref 32–36)
MCV RBC AUTO: 91 FL (ref 82–98)
NITRITE UR QL STRIP: NEGATIVE
PH UR STRIP: 5 [PH]
PLATELET # BLD AUTO: 321 K/UL (ref 150–450)
PMV BLD AUTO: 9.5 FL (ref 9.2–12.9)
POCT GLUCOSE: 115 MG/DL (ref 70–110)
POTASSIUM SERPL-SCNC: 3.8 MMOL/L (ref 3.5–5.1)
PROCALCITONIN SERPL-MCNC: 0.29 NG/ML
PROT UR QL STRIP: NEGATIVE
RBC # BLD AUTO: 4.18 M/UL (ref 4.6–6.2)
RH BLD: NORMAL
SODIUM SERPL-SCNC: 145 MMOL/L (ref 136–145)
SP GR UR STRIP: 1.01
SPECIMEN OUTDATE: NORMAL
UROBILINOGEN UR STRIP-ACNC: NEGATIVE EU/DL
WBC # BLD AUTO: 8.35 K/UL (ref 3.9–12.7)

## 2025-05-28 PROCEDURE — 85027 COMPLETE CBC AUTOMATED: CPT | Performed by: INTERNAL MEDICINE

## 2025-05-28 PROCEDURE — 27000221 HC OXYGEN, UP TO 24 HOURS

## 2025-05-28 PROCEDURE — 94640 AIRWAY INHALATION TREATMENT: CPT

## 2025-05-28 PROCEDURE — 25000003 PHARM REV CODE 250: Performed by: STUDENT IN AN ORGANIZED HEALTH CARE EDUCATION/TRAINING PROGRAM

## 2025-05-28 PROCEDURE — 25000003 PHARM REV CODE 250

## 2025-05-28 PROCEDURE — 63600175 PHARM REV CODE 636 W HCPCS: Performed by: STUDENT IN AN ORGANIZED HEALTH CARE EDUCATION/TRAINING PROGRAM

## 2025-05-28 PROCEDURE — 27100171 HC OXYGEN HIGH FLOW UP TO 24 HOURS

## 2025-05-28 PROCEDURE — 27201037 HC PRESSURE MONITORING SET UP

## 2025-05-28 PROCEDURE — 99900035 HC TECH TIME PER 15 MIN (STAT)

## 2025-05-28 PROCEDURE — 20000000 HC ICU ROOM

## 2025-05-28 PROCEDURE — 99291 CRITICAL CARE FIRST HOUR: CPT | Mod: GC,,, | Performed by: INTERNAL MEDICINE

## 2025-05-28 PROCEDURE — 86900 BLOOD TYPING SEROLOGIC ABO: CPT | Performed by: INTERNAL MEDICINE

## 2025-05-28 PROCEDURE — 94761 N-INVAS EAR/PLS OXIMETRY MLT: CPT

## 2025-05-28 PROCEDURE — 87040 BLOOD CULTURE FOR BACTERIA: CPT

## 2025-05-28 PROCEDURE — 83880 ASSAY OF NATRIURETIC PEPTIDE: CPT | Performed by: INTERNAL MEDICINE

## 2025-05-28 PROCEDURE — 99900031 HC PATIENT EDUCATION (STAT)

## 2025-05-28 PROCEDURE — 63600175 PHARM REV CODE 636 W HCPCS

## 2025-05-28 PROCEDURE — 25000242 PHARM REV CODE 250 ALT 637 W/ HCPCS: Performed by: STUDENT IN AN ORGANIZED HEALTH CARE EDUCATION/TRAINING PROGRAM

## 2025-05-28 PROCEDURE — 99499 UNLISTED E&M SERVICE: CPT | Mod: ,,, | Performed by: INTERNAL MEDICINE

## 2025-05-28 PROCEDURE — 82374 ASSAY BLOOD CARBON DIOXIDE: CPT | Performed by: INTERNAL MEDICINE

## 2025-05-28 PROCEDURE — 25000003 PHARM REV CODE 250: Performed by: INTERNAL MEDICINE

## 2025-05-28 PROCEDURE — 27000513 HC SENSOR FLOTRAC

## 2025-05-28 PROCEDURE — 63600175 PHARM REV CODE 636 W HCPCS: Mod: JZ,TB | Performed by: INTERNAL MEDICINE

## 2025-05-28 PROCEDURE — 84145 PROCALCITONIN (PCT): CPT | Performed by: STUDENT IN AN ORGANIZED HEALTH CARE EDUCATION/TRAINING PROGRAM

## 2025-05-28 PROCEDURE — 81003 URINALYSIS AUTO W/O SCOPE: CPT

## 2025-05-28 PROCEDURE — C1751 CATH, INF, PER/CENT/MIDLINE: HCPCS

## 2025-05-28 PROCEDURE — 36620 INSERTION CATHETER ARTERY: CPT

## 2025-05-28 RX ORDER — FUROSEMIDE 10 MG/ML
INJECTION INTRAMUSCULAR; INTRAVENOUS
Status: COMPLETED
Start: 2025-05-28 | End: 2025-05-28

## 2025-05-28 RX ORDER — ONDANSETRON HYDROCHLORIDE 2 MG/ML
INJECTION, SOLUTION INTRAVENOUS
Status: COMPLETED
Start: 2025-05-28 | End: 2025-05-28

## 2025-05-28 RX ORDER — ACETAMINOPHEN 500 MG
1000 TABLET ORAL
Status: DISCONTINUED | OUTPATIENT
Start: 2025-05-28 | End: 2025-05-28 | Stop reason: HOSPADM

## 2025-05-28 RX ORDER — NOREPINEPHRINE BITARTRATE/D5W 4MG/250ML
PLASTIC BAG, INJECTION (ML) INTRAVENOUS
Status: COMPLETED
Start: 2025-05-28 | End: 2025-05-28

## 2025-05-28 RX ORDER — NOREPINEPHRINE BITARTRATE/D5W 4MG/250ML
0-3 PLASTIC BAG, INJECTION (ML) INTRAVENOUS CONTINUOUS
Status: DISCONTINUED | OUTPATIENT
Start: 2025-05-28 | End: 2025-05-28

## 2025-05-28 RX ORDER — FUROSEMIDE 10 MG/ML
80 INJECTION INTRAMUSCULAR; INTRAVENOUS ONCE
Status: DISCONTINUED | OUTPATIENT
Start: 2025-05-28 | End: 2025-05-28

## 2025-05-28 RX ORDER — EPINEPHRINE 1 MG/ML
INJECTION INTRAMUSCULAR; INTRAVENOUS; SUBCUTANEOUS
Status: COMPLETED
Start: 2025-05-28 | End: 2025-05-28

## 2025-05-28 RX ORDER — SIMETHICONE 80 MG
1 TABLET,CHEWABLE ORAL 3 TIMES DAILY PRN
Status: DISCONTINUED | OUTPATIENT
Start: 2025-05-28 | End: 2025-06-04 | Stop reason: HOSPADM

## 2025-05-28 RX ORDER — DIPHENHYDRAMINE HCL 50 MG
50 CAPSULE ORAL ONCE
Status: CANCELLED | OUTPATIENT
Start: 2025-05-28 | End: 2025-05-28

## 2025-05-28 RX ORDER — ACETAMINOPHEN 325 MG/1
650 TABLET ORAL EVERY 6 HOURS PRN
Status: DISCONTINUED | OUTPATIENT
Start: 2025-05-28 | End: 2025-05-29

## 2025-05-28 RX ORDER — TALC
6 POWDER (GRAM) TOPICAL NIGHTLY PRN
Status: DISCONTINUED | OUTPATIENT
Start: 2025-05-28 | End: 2025-06-04 | Stop reason: HOSPADM

## 2025-05-28 RX ORDER — ONDANSETRON HYDROCHLORIDE 2 MG/ML
4 INJECTION, SOLUTION INTRAVENOUS ONCE
Status: COMPLETED | OUTPATIENT
Start: 2025-05-28 | End: 2025-05-28

## 2025-05-28 RX ORDER — FUROSEMIDE 10 MG/ML
40 INJECTION INTRAMUSCULAR; INTRAVENOUS ONCE
Status: DISCONTINUED | OUTPATIENT
Start: 2025-05-28 | End: 2025-05-28

## 2025-05-28 RX ORDER — DIPHENHYDRAMINE HYDROCHLORIDE 50 MG/ML
INJECTION, SOLUTION INTRAMUSCULAR; INTRAVENOUS
Status: DISCONTINUED | OUTPATIENT
Start: 2025-05-28 | End: 2025-05-31

## 2025-05-28 RX ORDER — SODIUM CHLORIDE 9 MG/ML
INJECTION, SOLUTION INTRAVENOUS CONTINUOUS
Status: DISCONTINUED | OUTPATIENT
Start: 2025-05-28 | End: 2025-05-28

## 2025-05-28 RX ORDER — IPRATROPIUM BROMIDE AND ALBUTEROL SULFATE 2.5; .5 MG/3ML; MG/3ML
3 SOLUTION RESPIRATORY (INHALATION) EVERY 4 HOURS
Status: DISCONTINUED | OUTPATIENT
Start: 2025-05-28 | End: 2025-05-29

## 2025-05-28 RX ORDER — SODIUM CHLORIDE 0.9 % (FLUSH) 0.9 %
10 SYRINGE (ML) INJECTION
Status: DISCONTINUED | OUTPATIENT
Start: 2025-05-28 | End: 2025-05-30 | Stop reason: CLARIF

## 2025-05-28 RX ORDER — DIPHENHYDRAMINE HCL 50 MG
50 CAPSULE ORAL ONCE
Status: DISCONTINUED | OUTPATIENT
Start: 2025-05-28 | End: 2025-05-28 | Stop reason: HOSPADM

## 2025-05-28 RX ORDER — METHYLPREDNISOLONE SOD SUCC 125 MG
125 VIAL (EA) INJECTION ONCE
Status: COMPLETED | OUTPATIENT
Start: 2025-05-28 | End: 2025-05-28

## 2025-05-28 RX ORDER — MUPIROCIN 20 MG/G
OINTMENT TOPICAL 2 TIMES DAILY
Status: DISPENSED | OUTPATIENT
Start: 2025-05-28 | End: 2025-06-02

## 2025-05-28 RX ORDER — LIDOCAINE HYDROCHLORIDE 10 MG/ML
INJECTION, SOLUTION EPIDURAL; INFILTRATION; INTRACAUDAL; PERINEURAL
Status: COMPLETED
Start: 2025-05-28 | End: 2025-05-28

## 2025-05-28 RX ORDER — HEPARIN SODIUM 5000 [USP'U]/ML
5000 INJECTION, SOLUTION INTRAVENOUS; SUBCUTANEOUS ONCE
Status: COMPLETED | OUTPATIENT
Start: 2025-05-28 | End: 2025-05-28

## 2025-05-28 RX ORDER — PRAVASTATIN SODIUM 40 MG/1
40 TABLET ORAL DAILY
Status: DISCONTINUED | OUTPATIENT
Start: 2025-05-29 | End: 2025-06-04 | Stop reason: HOSPADM

## 2025-05-28 RX ORDER — BICALUTAMIDE 50 MG/1
50 TABLET, FILM COATED ORAL DAILY
Status: DISCONTINUED | OUTPATIENT
Start: 2025-05-29 | End: 2025-06-04 | Stop reason: HOSPADM

## 2025-05-28 RX ORDER — EPINEPHRINE 0.1 MG/ML
INJECTION INTRAVENOUS
Status: DISCONTINUED | OUTPATIENT
Start: 2025-05-28 | End: 2025-06-03

## 2025-05-28 RX ORDER — POLYETHYLENE GLYCOL 3350 17 G/17G
17 POWDER, FOR SOLUTION ORAL 2 TIMES DAILY PRN
Status: DISCONTINUED | OUTPATIENT
Start: 2025-05-28 | End: 2025-06-04 | Stop reason: HOSPADM

## 2025-05-28 RX ORDER — FUROSEMIDE 10 MG/ML
40 INJECTION INTRAMUSCULAR; INTRAVENOUS ONCE
Status: COMPLETED | OUTPATIENT
Start: 2025-05-28 | End: 2025-05-28

## 2025-05-28 RX ORDER — SODIUM CHLORIDE 9 MG/ML
INJECTION, SOLUTION INTRAVENOUS CONTINUOUS
Status: CANCELLED | OUTPATIENT
Start: 2025-05-28 | End: 2025-05-28

## 2025-05-28 RX ORDER — SENNOSIDES 8.6 MG/1
8.6 TABLET ORAL DAILY PRN
Status: DISCONTINUED | OUTPATIENT
Start: 2025-05-28 | End: 2025-06-04 | Stop reason: HOSPADM

## 2025-05-28 RX ADMIN — EPINEPHRINE 0.1 MCG/KG/MIN: 1 INJECTION INTRAMUSCULAR; INTRAVENOUS; SUBCUTANEOUS at 02:05

## 2025-05-28 RX ADMIN — IPRATROPIUM BROMIDE AND ALBUTEROL SULFATE 3 ML: 2.5; .5 SOLUTION RESPIRATORY (INHALATION) at 08:05

## 2025-05-28 RX ADMIN — MUPIROCIN: 20 OINTMENT TOPICAL at 08:05

## 2025-05-28 RX ADMIN — EPINEPHRINE 0.3 MG: 0.1 INJECTION INTRAVENOUS at 11:05

## 2025-05-28 RX ADMIN — NOREPINEPHRINE BITARTRATE 0.04 MCG/KG/MIN: 4 INJECTION, SOLUTION INTRAVENOUS at 12:05

## 2025-05-28 RX ADMIN — EPINEPHRINE 0.04 MCG/KG/MIN: 1 INJECTION INTRAMUSCULAR; INTRAVENOUS; SUBCUTANEOUS at 12:05

## 2025-05-28 RX ADMIN — IPRATROPIUM BROMIDE AND ALBUTEROL SULFATE 3 ML: 2.5; .5 SOLUTION RESPIRATORY (INHALATION) at 11:05

## 2025-05-28 RX ADMIN — ONDANSETRON 4 MG: 2 INJECTION INTRAMUSCULAR; INTRAVENOUS at 12:05

## 2025-05-28 RX ADMIN — DIPHENHYDRAMINE HYDROCHLORIDE 50 MG: 50 INJECTION, SOLUTION INTRAMUSCULAR; INTRAVENOUS at 11:05

## 2025-05-28 RX ADMIN — FUROSEMIDE 40 MG: 10 INJECTION, SOLUTION INTRAVENOUS at 11:05

## 2025-05-28 RX ADMIN — ONDANSETRON: 2 INJECTION INTRAMUSCULAR; INTRAVENOUS at 11:05

## 2025-05-28 RX ADMIN — LIDOCAINE HYDROCHLORIDE 50 MG: 10 INJECTION, SOLUTION EPIDURAL; INFILTRATION; INTRACAUDAL at 01:05

## 2025-05-28 RX ADMIN — IPRATROPIUM BROMIDE AND ALBUTEROL SULFATE 3 ML: 2.5; .5 SOLUTION RESPIRATORY (INHALATION) at 05:05

## 2025-05-28 RX ADMIN — FUROSEMIDE: 10 INJECTION, SOLUTION INTRAMUSCULAR; INTRAVENOUS at 11:05

## 2025-05-28 RX ADMIN — EPINEPHRINE 0.3 MG: 0.1 INJECTION INTRAVENOUS at 12:05

## 2025-05-28 RX ADMIN — NOREPINEPHRINE BITARTRATE 0.02 MCG/KG/MIN: 4 INJECTION, SOLUTION INTRAVENOUS at 02:05

## 2025-05-28 RX ADMIN — HEPARIN SODIUM 5000 UNITS: 5000 INJECTION INTRAVENOUS; SUBCUTANEOUS at 09:05

## 2025-05-28 RX ADMIN — METHYLPREDNISOLONE SODIUM SUCCINATE 125 MG: 125 INJECTION, POWDER, LYOPHILIZED, FOR SOLUTION INTRAMUSCULAR; INTRAVENOUS at 11:05

## 2025-05-28 NOTE — CARE UPDATE
Pt was desating, complaining of shortness of breath. Diaphoretic and increased work of breathing. I placed Pt on NRB mask. Nurse called rapid. Few minutes later Pt. Was back to normal. Rapid was canceled. A few minutes later, he started complaining of shortness of breathe again. Asked Md if I could give Patient a breathing treatment.     Gave Pt a breathing trmt, md asked nurse to call rapid, to move Pt. To ICU

## 2025-05-28 NOTE — SIGNIFICANT EVENT
"RAPID RESPONSE RESPIRATORY THERAPY NOTE             Code Status: No Order   : 1945  Bed: 54650/13998 A:   MRN: [id number]  Time page Received: 1151  Time Rapid Response RT at Bedside: 1153  Time Rapid Response RT left Bedside: 1221    SITUATION    Evaluated patient for: Hypotension    BACKGROUND    Why is the patient in the hospital?: <principal problem not specified>    Patient has a past medical history of Alcohol abuse, daily use, Amblyopia, Aortic valve stenosis, Cancer, Cataract, Complication of anesthesia, COPD (chronic obstructive pulmonary disease), Diverticulosis, Gross hematuria, History of colonic polyps, History of malignant melanoma, HTN (hypertension), Hyperlipemia, and Kidney stone.    24 Hours Vitals Range:  Temp:  [97.5 °F (36.4 °C)-97.8 °F (36.6 °C)]   Pulse:  []   Resp:  [19-54]   BP: ()/(0-68)   SpO2:  [87 %-100 %]   Arterial Line BP: ()/(16-59)     Labs:    Recent Labs     25  1133      K 3.8      CO2 26   BUN 19   CREATININE 0.9           No results for input(s): "PH", "PCO2", "PO2", "HCO3", "POCSATURATED", "BE" in the last 72 hours.    ASSESSMENT/INTERVENTIONS  Rapid paged to short stay bed 01. Pt here for TAVR procedure became acutely hypotensive while having peripheral IV placed. Anaesthesia gave 600mcg Cesar. Cont Epi drip started as well. Decision made to transfer pt to SICU bed 88351 for higher level of care. Pt transported by RRT on 6L NC. Report given to RT Dailey. Left pt bedside at 1221.    Last Vitals: Temp: 97.8 °F (36.6 °C) ( 1500)  Pulse: 66 ( 1700)  Resp: 38 ( 1700)  BP: 113/57 ( 1700)  SpO2: 93 % ( 170)  Arterial Line BP: 102/48 ( 1700)  Level of Consciousness: Level of Consciousness (AVPU): alert  Respiratory Effort: Respiratory Effort: Mild, Labored  Expansion/Accessory Muscle Usage: Expansion/Accessory Muscles/Retractions: expansion symmetric, no retractions, no use of accessory muscles  All Lung Field " Breath Sounds: All Lung Fields Breath Sounds: Anterior:, Lateral:, diminished  SIERRA Breath Sounds: coarse  LLL Breath Sounds: coarse  O2 Device/Concentration: 6L NC.  NIPPV: No Surgical airway: No Vent: No  ETCO2 monitored:    Ambu at bedside: Yes.    Active Orders   Respiratory Care    Inhalation Treatment Q4H     Frequency: Q4H     Number of Occurrences: Until Specified       RECOMMENDATIONS  ?  We recommend: RRT Recs: Continue POC per primary team.    ESCALATION        FOLLOW-UP    Disposition: Tx in ICU bed 72119.    Please call back the Rapid Response RT, Jorge Madden RRT at x [phone] for any questions or concerns.

## 2025-05-28 NOTE — SUBJECTIVE & OBJECTIVE
Past Medical History:   Diagnosis Date    Alcohol abuse, daily use     8/2024 stopped    Amblyopia     OS    Aortic valve stenosis     Cancer     melanoma, prostate    Cataract     Complication of anesthesia     says he sometimes is slow to awaken    COPD (chronic obstructive pulmonary disease)     no oxygen, no inhalers, or nebulizers    Diverticulosis     Gross hematuria     History of colonic polyps     History of malignant melanoma 01/01/2011    right ear, had chemo//Dr Riverst    HTN (hypertension)     Hyperlipemia     Kidney stone        Past Surgical History:   Procedure Laterality Date    CORONARY ANGIOGRAPHY N/A 5/16/2025    Procedure: ANGIOGRAM, CORONARY ARTERY;  Surgeon: Gume Blood MD;  Location: [facility] CATH LAB;  Service: Cardiology;  Laterality: N/A;    cystolithopaxy      CYSTOSCOPIC LITHOLAPAXY N/A 3/25/2025    Procedure: CYSTOLITHOLAPAXY - with Thulium Laser - Bladder Stone;  Surgeon: EZ Kwan MD;  Location: [facility] OR;  Service: Urology;  Laterality: N/A;    EXTERNAL EAR SURGERY  01/01/2011    melanoma removed right ear    HERNIA REPAIR      RIH    PORTACATH PLACEMENT  01/01/2011    later removed    PROSTATE SURGERY  01/01/2003    prostatectomy       Review of patient's allergies indicates:   Allergen Reactions    Aspirin Hives       No current facility-administered medications on file prior to encounter.     Current Outpatient Medications on File Prior to Encounter   Medication Sig    b complex vitamins tablet Take 1 tablet by mouth once daily.    benazepriL (LOTENSIN) 20 MG tablet Take 1 tablet (20 mg total) by mouth 2 (two) times daily.    bicalutamide (CASODEX) 50 MG Tab TAKE 1 TABLET (50 MG TOTAL) BY MOUTH ONCE DAILY.    hydroCHLOROthiazide (HYDRODIURIL) 12.5 MG Tab Take 1 tablet (12.5 mg total) by mouth every morning.    multivitamin capsule Take 1 capsule by mouth once daily.    pravastatin (PRAVACHOL) 40 MG tablet Take 1 tablet (40 mg total) by mouth once daily.     Family  History       Problem Relation (Age of Onset)    Alcohol abuse Father, Brother    Blindness Father    Drug abuse Brother    Heart attack Mother    Heart defect Maternal Uncle          Tobacco Use    Smoking status: Every Day     Current packs/day: 1.00     Types: Cigarettes     Passive exposure: Current    Smokeless tobacco: Never   Vaping Use    Vaping status: Never Used   Substance and Sexual Activity    Alcohol use: Not Currently     Comment: 6-7 nightly for yrs/ stopped 2024    Drug use: No    Sexual activity: Yes     Partners: Female     Review of Systems   Constitutional: Negative for chills and fever.   Cardiovascular:  Negative for chest pain, irregular heartbeat, leg swelling, near-syncope, orthopnea, palpitations and syncope.   Respiratory:  Negative for shortness of breath.    Gastrointestinal:  Negative for abdominal pain, nausea and vomiting.   Neurological:  Negative for light-headedness.     Objective:     Vital Signs (Most Recent):  Temp: 97.8 °F (36.6 °C) (05/28/25 1500)  Pulse: 66 (05/28/25 1700)  Resp: (!) 38 (05/28/25 1700)  BP: (!) 113/57 (05/28/25 1700)  SpO2: (!) 93 % (05/28/25 1700) Vital Signs (24h Range):  Temp:  [97.5 °F (36.4 °C)-97.8 °F (36.6 °C)] 97.8 °F (36.6 °C)  Pulse:  [] 66  Resp:  [19-54] 38  SpO2:  [87 %-100 %] 93 %  BP: ()/(0-68) 113/57  Arterial Line BP: ()/(16-59) 102/48     Weight: 83.9 kg (185 lb)  Body mass index is 23.75 kg/m².    SpO2: (!) 93 %         Intake/Output Summary (Last 24 hours) at 5/28/2025 1737  Last data filed at 5/28/2025 1700  Gross per 24 hour   Intake 102.56 ml   Output --   Net 102.56 ml       Lines/Drains/Airways       Central Venous Catheter Line  Duration             Percutaneous Central Line - Triple Lumen  05/28/25 1305 <1 day              Arterial Line  Duration             Arterial Line 05/28/25 1254 Right Radial <1 day              Peripheral Intravenous Line  Duration                  Peripheral IV - Single Lumen 05/28/25 1140  20 G Right Antecubital <1 day         Peripheral IV - Single Lumen 05/28/25 1230 20 G No Anterior;Left Forearm <1 day                     Physical Exam  Constitutional:       Appearance: Normal appearance.   HENT:      Mouth/Throat:      Mouth: Mucous membranes are moist.   Eyes:      Extraocular Movements: Extraocular movements intact.      Conjunctiva/sclera: Conjunctivae normal.   Cardiovascular:      Rate and Rhythm: Normal rate and regular rhythm.   Pulmonary:      Effort: Pulmonary effort is normal.   Abdominal:      General: Abdomen is flat.      Palpations: Abdomen is soft.   Musculoskeletal:      Right lower leg: No edema.      Left lower leg: No edema.   Skin:     General: Skin is warm and dry.   Neurological:      Mental Status: He is alert.          Significant Labs: All pertinent lab results from the last 24 hours have been reviewed.    Significant Imaging: Reviewed

## 2025-05-28 NOTE — Clinical Note
The catheter was repositioned into the aorta. An angiography was performed of the aorta. The angiography was performed via power injection. The injected amount was 50 mL contrast at 20 mL/s. The PSI from the power injection was 700.

## 2025-05-28 NOTE — EICU
Called to the room for Time out    [x] Verified patient name   [x] Verified patient   [x] Read from armband    Central line    Procedure checklist: Time out flowsheet complete and CXR ordered

## 2025-05-28 NOTE — NURSING
Patient  with c/o nausea after left forearm iV started, then rapidly became short of breath, tachycardic, pale and c/o chest tightness. Became unresponsive with eyes wide open without palpable pulse while placing zoll pads. Upon dropping side rail down to initiate chest compressions. Patient gurgled and took a breath with eye movement. Pulse palpable , but thready. Rapid team and cardiac  cath lab team notified and to bedside at 1130.     Patient remained tachypneic with abdominal breathing and accessory muscles. Pulse ox 79% on room air.placed on NRB mask. Respiratory at bedside. + nausea, + red rash throughout torso, + diaphoresis + incontinent of Bowel    BP unobtainable With machine.   58/D with doppler to right arm.   ST at 110's.   100% on NRB mask.      See rapid response charting.    1140- 100 Cesar IVP  1152- 400 Cesar IVP  1202- 200 Cesar IVP    Epi drip started at  0.04 mcg/kg/min at 1205 upon transfer with rapid response team

## 2025-05-28 NOTE — ANESTHESIA PREPROCEDURE EVALUATION
[facility]  Anesthesia Pre-Operative Evaluation         Patient Name: Dutch Olivier  YOB: 1945  MRN: [id number]    SUBJECTIVE:     Pre-operative evaluation for Procedure(s) (LRB):  REPLACEMENT, AORTIC VALVE, TRANSCATHETER (TAVR) (N/A)     05/28/2025    Dutch Olivier is a 79 y.o. male w/ a significant PMHx of HTN, HLD, COPD, prostate ca with mets to bone, PAD, severe AS. Patient was supposed to have TAVR procedure 5/28 but developed allergic reaction requiring large doses of epinephrine.     Gtts: epi 0.1, no longer on norepi  CVC RIJ, R radial A line, PIV    Now presenting for the above procedure(s).       2D ECHO:  TTE:  Results for orders placed during the hospital encounter of 04/22/25    Echo    Interpretation Summary    Left Ventricle: The left ventricle is normal in size. Normal wall thickness. There is normal systolic function with a visually estimated ejection fraction of 60 - 65%. There is normal diastolic function.    Right Ventricle: The right ventricle is normal in size. Wall thickness is normal. Systolic function is normal.    Aortic Valve: The aortic valve is a trileaflet valve. There is moderate to severe aortic valve sclerosis. Severely restricted motion. There is severe stenosis. Aortic valve area by VTI is 0.9 cm². Aortic valve peak velocity is 4.2 m/s. Mean gradient is 44 mmHg. The dimensionless index is 0.17.    Pulmonary Artery: There is borderline elevated pulmonary hypertension. The estimated pulmonary artery systolic pressure is 34 mmHg.    IVC/SVC: Normal venous pressure at 3 mmHg.      JEWEL:  No results found for this or any previous visit.    Prev airway: 3/2025 easy mask, LMA 4   EPINEPHrine  0-2 mcg/kg/min Intravenous Continuous 25.2 mL/hr at 05/28/25 1700 0.1 mcg/kg/min at 05/28/25 1700       Problem List[1]    Review of patient's allergies indicates:   Allergen Reactions    Aspirin Hives       Current Outpatient Medications   Medication Instructions     b complex vitamins tablet 1 tablet, Daily    benazepriL (LOTENSIN) 20 mg, Oral, 2 times daily    bicalutamide (CASODEX) 50 mg, Oral, Daily    hydroCHLOROthiazide 12.5 mg, Oral, Every morning    multivitamin capsule 1 capsule, Daily    pravastatin (PRAVACHOL) 40 mg, Oral, Daily       Surgical History  Past Surgical History:   Procedure Laterality Date    CORONARY ANGIOGRAPHY N/A 5/16/2025    Procedure: ANGIOGRAM, CORONARY ARTERY;  Surgeon: Gume Blood MD;  Location: [facility] CATH LAB;  Service: Cardiology;  Laterality: N/A;    cystolithopaxy      CYSTOSCOPIC LITHOLAPAXY N/A 3/25/2025    Procedure: CYSTOLITHOLAPAXY - with Thulium Laser - Bladder Stone;  Surgeon: EZ Kwan MD;  Location: [facility];  Service: Urology;  Laterality: N/A;    EXTERNAL EAR SURGERY  01/01/2011    melanoma removed right ear    HERNIA REPAIR      RIH    PORTACATH PLACEMENT  01/01/2011    later removed    PROSTATE SURGERY  01/01/2003    prostatectomy       Social History:  Tobacco Use: High Risk (5/28/2025)    Patient History     Smoking Tobacco Use: Every Day     Smokeless Tobacco Use: Never     Passive Exposure: Current     Alcohol Use: Not on file         OBJECTIVE:     Vital Signs Range (Last 24H):  Temp:  [36.4 °C (97.5 °F)-36.6 °C (97.8 °F)]   Pulse:  []   Resp:  [19-54]   BP: ()/(0-68)   SpO2:  [87 %-100 %]   Arterial Line BP: ()/(16-59)       Significant Labs:  Lab Results   Component Value Date    WBC 8.35 05/28/2025    HGB 12.5 (L) 05/28/2025    HCT 37.9 (L) 05/28/2025     05/28/2025    INR 1.0 03/20/2025       Lab Results   Component Value Date     05/28/2025    K 3.8 05/28/2025     05/28/2025    CREATININE 0.9 05/28/2025    BUN 19 05/28/2025    CO2 26 05/28/2025       Lab Results   Component Value Date    TSH 0.721 09/13/2023    HGBA1C 5.1 10/08/2024       EKG:   Results for orders placed or performed during the hospital encounter of 05/16/25   EKG 12-lead    Collection Time:  05/16/25 10:56 AM   Result Value Ref Range    QRS Duration 100 ms    [facility] QTC Calculation 410 ms    Narrative    Test Reason : Z01.818,    Vent. Rate :  48 BPM     Atrial Rate :  48 BPM     P-R Int : 178 ms          QRS Dur : 100 ms      QT Int : 460 ms       P-R-T Axes :  75  29  63 degrees    QTcB Int : 410 ms    Sinus bradycardia  Minimal voltage criteria for LVH, may be normal variant ( Sokolow-Wallis )  Borderline Abnormal ECG  When compared with ECG of 20-Mar-2025 10:19,  No significant change was found  Confirmed by Jolly Cruz ([id number]) on 5/16/2025 1:16:44 PM    Referred By: MARCIE LOPEZ           Confirmed By: Jolly Cruz       ASSESSMENT/PLAN:          Pre-op Assessment    I have reviewed the Patient Summary Reports.     I have reviewed the Nursing Notes. I have reviewed the NPO Status.   I have reviewed the Medications.     Review of Systems  Anesthesia Hx:   History of prior surgery of interest to airway management or planning:          Denies Family Hx of Anesthesia complications.    Denies Personal Hx of Anesthesia complications.                    Social:  Former Smoker       Hematology/Oncology:       -- Anemia:                    --  Cancer in past history:                 Oncology Comments: prostate     EENT/Dental:  EENT/Dental Normal           Cardiovascular:     Hypertension Valvular problems/Murmurs, AS   Denies CAD.    Denies CABG/stent.  Denies Dysrhythmias.    Denies CHF.    hyperlipidemia                         Hypertension         Pulmonary:   COPD         Chronic Obstructive Pulmonary Disease (COPD):                      Renal/:  Chronic Renal Disease        Kidney Function/Disease             Hepatic/GI:  Hepatic/GI Normal                    Musculoskeletal:  Musculoskeletal Normal                Neurological:  Neurology Normal                                      Endocrine:  Endocrine Normal            Dermatological:  Skin Normal    Psych:  Psychiatric History                   Physical Exam  General: Well nourished, Cooperative, Alert and Oriented    Airway:  Mallampati: II / I  Mouth Opening: Normal  TM Distance: Normal  Tongue: Normal  Neck ROM: Normal ROM    Dental:  Intact    Chest/Lungs:  Normal Respiratory Rate    Heart:  Rate: Normal        Anesthesia Plan  Type of Anesthesia, risks & benefits discussed:    Anesthesia Type: Gen Natural Airway  Intra-op Monitoring Plan: Standard ASA Monitors, Art Line and Central Line  Post Op Pain Control Plan: multimodal analgesia and IV/PO Opioids PRN  Induction:  IV  Informed Consent: Informed consent signed with the Patient and all parties understand the risks and agree with anesthesia plan.  All questions answered.   ASA Score: 4  Day of Surgery Review of History & Physical: H&P Update referred to the surgeon/provider.    Ready For Surgery From Anesthesia Perspective.     .           [1]   Patient Active Problem List  Diagnosis    Hyperlipemia    COPD (chronic obstructive pulmonary disease)    Essential hypertension    Erectile dysfunction    History of carcinoma in situ of prostate    History of malignant melanoma    Tobacco use disorder    Hernia, inguinal    Glucose intolerance (impaired glucose tolerance)    Prostate cancer metastatic to bone    Aorto-iliac atherosclerosis    Aortic valve sclerosis    Heart murmur    Pulmonary hypertension    Aortic stenosis    Prostate cancer    Mild mitral regurgitation    Bladder stone    PAD (peripheral artery disease)    Anaphylaxis

## 2025-05-28 NOTE — HPI
Dutch Olivier is a 78 yo M with a history of nonobstructive CAD, COPD, HLD, HTN, prostate ca w bone mets, and severe AS who presented for outpatient TAVR. Admitted to CCU for acute anaphylaxis pre-procedure.     Patient presented today for outpatient TAVR. Prior to the procedure, he has a peripheral IV inserted with normal saline injected. He then became acutely dyspneic, tachycardic, and diaphoretic. Diffuse rash ntoed across torso. He became unresponsive, though did not lose a pulse. He was placed on NRB. Given Cesar pushes x3, Epi gtt started. Given solumedrol, benadryl, and albuterol nebs. Concern for possible HF contributing and given Lasix. Levo was also added on prior to transfer to the ICU.     On evaluation in the ICU, patient on Epi and Levo with MAPs >65. Sating well on 6L NC. CVL and ART line placed bedside. He reports feeling well at this time, denying chest pain, dyspnea, pre-syncope. Gtts since weaned with Levo discontinued and Epi 0.1. Sating well on 3L NC.

## 2025-05-28 NOTE — NURSING
Notified MD Jak that pt Bu[place] was dampened and positional, dressing changed w no improvement; BP cuff accurate - no new orders received will continue to monitor.

## 2025-05-28 NOTE — NURSING
Pt report given to receiving RN; Pt plan of care discussed; Pt VSS; Pt has no c/o pain or discomfort at this time; Pt bed locked + lowered, Srx3, + call bell within reach; Pt daughters at bedside and updated

## 2025-05-28 NOTE — ASSESSMENT & PLAN NOTE
Prior to TAVR procedure, peripheral IV inserted with normal saline injected. Patient then became hypotensive, dyspneic, and diaphoretic, with rasha cross torso. Placed on NRB and given solumedrol, benadryl, and nebs. S/p omar, then started on epi gtt and levo gtt. Patient admitted to the ICU, since improved with ability to wean o2 and pressors. Unknown source, no medication administered prior to reaction. No h/o anaphylaxis, no significant allergies.   - Infectious workup ordered  - Continue Epi, wean as tolerated  - Albuterol nebs scheduled  - Continue to monitor in the ICU

## 2025-05-28 NOTE — NURSING
Pt to [id number] for RR; Pt brought up on Epi gtt MAPs 55 -titrating for a MAP > 60 and Levo gtt started per order; Pt A&O on 6L NC RT and Cards MD Ja at bedside; New orders received to draw blood and urine cultures, orders carried out will continue to monitor  Nancy and RIJ triple lumen place - see EICU notes

## 2025-05-28 NOTE — CODE/ RAPID DOCUMENTATION
RAPID RESPONSE NURSE NOTE        Admit Date: 2025  LOS: 0  Code Status: No Order   Date of Consult: 2025  : 1945  Age: 79 y.o.  Weight:   Wt Readings from Last 1 Encounters:   25 83.9 kg (185 lb)     Sex: male  Race: White   Bed: 92776/43472 A:   MRN: [id number]  Time Rapid Response Team page Received: 1136  Time Rapid Response Team at Bedside: 1138  Time Rapid Response Team left Bedside: 1221  Was the patient discharged from an ICU this admission? No   Was the patient discharged from a PACU within last 24 hours? No   Did the patient receive conscious sedation/general anesthesia in last 24 hours? No  Was the patient in the ED within the past 24 hours? No  Was the patient on NIPPV within the past 24 hours? No   Did this progress into an ARC or CPA: No  Attending Physician: Leandro Madrid MD  Primary Service: Cardiology,Cardiovascular Disease       SITUATION    Notified by Pager.  Reason for alert: Hypotension  Called to evaluate the patient for Circulatory.    BACKGROUND     Why is the patient in the hospital?: <principal problem not specified>    Patient has a past medical history of Alcohol abuse, daily use, Amblyopia, Aortic valve stenosis, Cancer, Cataract, Complication of anesthesia, COPD (chronic obstructive pulmonary disease), Diverticulosis, Gross hematuria, History of colonic polyps, History of malignant melanoma, HTN (hypertension), Hyperlipemia, and Kidney stone.    Last Vitals:  Temp: 97.5 °F (36.4 °C) ( 1111)  Pulse: 80 ( 1258)  Resp: 45 ( 1245)  BP: 91/49 ( 1245)  SpO2: 99 % ( 1245)  Arterial Line BP: 97/41 ( 1245)    24 Hours Vitals Range:  Temp:  [97.5 °F (36.4 °C)]   Pulse:  []   Resp:  [21-45]   BP: ()/(0-66)   SpO2:  [87 %-100 %]   Arterial Line BP: (27-97)/(16-41)     Labs:  Recent Labs     25  1133   WBC 8.35   HGB 12.5*   HCT 37.9*          Recent Labs     25  1133      K 3.8      CO2 26   BUN 19    CREATININE 0.9       Patient admitted for a TAVR while having a peripherial IV placed patient became acutely hypotensive developed a rash. 600 mcg if Csear given by Anaesthesia. Epi 0.04mcg/kg/min started and transported to SICU       ASSESSMENT     Physical Exam  Vitals and nursing note reviewed.   Constitutional:       Appearance: He is ill-appearing and diaphoretic.      Interventions: Nasal cannula in place.   Cardiovascular:      Rate and Rhythm: Tachycardia present.   Pulmonary:      Effort: Pulmonary effort is normal.   Neurological:      Mental Status: He is alert and oriented to person, place, and time.      GCS: GCS eye subscore is 4. GCS verbal subscore is 5. GCS motor subscore is 6.   Psychiatric:         Behavior: Behavior is cooperative.         INTERVENTIONS    The patient was seen for Cardiac problem. Staff concerns included hypotension. The following interventions were performed: cardiology consult.    RECOMMENDATIONS    We recommend: Critical care upgrade    PROVIDER ESCALATION    Orders received and case discussed with Dr. Pascual.    Primary team arrival time: Upon arrival    Disposition: Tx in ICU bed 65767.    FOLLOW UP    Charge nurse, Beatriz updated on plan of care. Instructed to call the Rapid Response Nurse, Pratik Baig RN at [phone] for additional questions or concerns.

## 2025-05-28 NOTE — ASSESSMENT & PLAN NOTE
H/o severe AS, presented for outpatient TAVR. Deferred due to anaphylactic reaction pre-procedure.   - NPO MN for possible TAVR tomorrow, discuss with IC in the morning

## 2025-05-28 NOTE — Clinical Note
An angiography was performed of the aorta. The angiography was performed via power injection. The injected amount was 20 mL contrast at 20 mL/s. The PSI from the power injection was 1000.

## 2025-05-28 NOTE — H&P
----- Message from Rosario Ann sent at 4/14/2023 12:08 PM CDT -----  Regarding: CALL PT  CALL PT kay     [facility]  Cardiology  History and Physical     Patient Name: Dutch Olivier  MRN: [id number]  Admission Date: 5/28/2025  Code Status: No Order   Attending Provider: Leandro Madrid MD   Primary Care Physician: David Arreola MD  Principal Problem:Anaphylaxis    Patient information was obtained from patient, past medical records, and ER records.     Subjective:     Chief Complaint:  Dyspnea     HPI:  Dutch Olivier is a 80 yo M with a history of nonobstructive CAD, COPD, HLD, HTN, prostate ca w bone mets, and severe AS who presented for outpatient TAVR. Admitted to CCU for acute anaphylaxis pre-procedure.     Patient presented today for outpatient TAVR. Prior to the procedure, he has a peripheral IV inserted with normal saline injected. He then became acutely dyspneic, tachycardic, and diaphoretic. Diffuse rash ntoed across torso. He became unresponsive, though did not lose a pulse. He was placed on NRB. Given Cesar pushes x3, Epi gtt started. Given solumedrol, benadryl, and albuterol nebs. Concern for possible HF contributing and given Lasix. Levo was also added on prior to transfer to the ICU.     On evaluation in the ICU, patient on Epi and Levo with MAPs >65. Sating well on 6L NC. CVL and ART line placed bedside. He reports feeling well at this time, denying chest pain, dyspnea, pre-syncope. Gtts since weaned with Levo discontinued and Epi 0.1. Sating well on 3L NC.     Past Medical History:   Diagnosis Date    Alcohol abuse, daily use     8/2024 stopped    Amblyopia     OS    Aortic valve stenosis     Cancer     melanoma, prostate    Cataract     Complication of anesthesia     says he sometimes is slow to awaken    COPD (chronic obstructive pulmonary disease)     no oxygen, no inhalers, or nebulizers    Diverticulosis     Gross hematuria     History of colonic polyps     History of malignant melanoma 01/01/2011    right ear, had chemo//Dr Gongora    HTN (hypertension)      Hyperlipemia     Kidney stone        Past Surgical History:   Procedure Laterality Date    CORONARY ANGIOGRAPHY N/A 5/16/2025    Procedure: ANGIOGRAM, CORONARY ARTERY;  Surgeon: Gume Blood MD;  Location: [facility] CATH LAB;  Service: Cardiology;  Laterality: N/A;    cystolithopaxy      CYSTOSCOPIC LITHOLAPAXY N/A 3/25/2025    Procedure: CYSTOLITHOLAPAXY - with Thulium Laser - Bladder Stone;  Surgeon: EZ Kwan MD;  Location: [facility];  Service: Urology;  Laterality: N/A;    EXTERNAL EAR SURGERY  01/01/2011    melanoma removed right ear    HERNIA REPAIR      RIH    PORTACATH PLACEMENT  01/01/2011    later removed    PROSTATE SURGERY  01/01/2003    prostatectomy       Review of patient's allergies indicates:   Allergen Reactions    Aspirin Hives       No current facility-administered medications on file prior to encounter.     Current Outpatient Medications on File Prior to Encounter   Medication Sig    b complex vitamins tablet Take 1 tablet by mouth once daily.    benazepriL (LOTENSIN) 20 MG tablet Take 1 tablet (20 mg total) by mouth 2 (two) times daily.    bicalutamide (CASODEX) 50 MG Tab TAKE 1 TABLET (50 MG TOTAL) BY MOUTH ONCE DAILY.    hydroCHLOROthiazide (HYDRODIURIL) 12.5 MG Tab Take 1 tablet (12.5 mg total) by mouth every morning.    multivitamin capsule Take 1 capsule by mouth once daily.    pravastatin (PRAVACHOL) 40 MG tablet Take 1 tablet (40 mg total) by mouth once daily.     Family History       Problem Relation (Age of Onset)    Alcohol abuse Father, Brother    Blindness Father    Drug abuse Brother    Heart attack Mother    Heart defect Maternal Uncle          Tobacco Use    Smoking status: Every Day     Current packs/day: 1.00     Types: Cigarettes     Passive exposure: Current    Smokeless tobacco: Never   Vaping Use    Vaping status: Never Used   Substance and Sexual Activity    Alcohol use: Not Currently     Comment: 6-7 nightly for yrs/ stopped 2024    Drug use: No    Sexual  activity: Yes     Partners: Female     Review of Systems   Constitutional: Negative for chills and fever.   Cardiovascular:  Negative for chest pain, irregular heartbeat, leg swelling, near-syncope, orthopnea, palpitations and syncope.   Respiratory:  Negative for shortness of breath.    Gastrointestinal:  Negative for abdominal pain, nausea and vomiting.   Neurological:  Negative for light-headedness.     Objective:     Vital Signs (Most Recent):  Temp: 97.8 °F (36.6 °C) (05/28/25 1500)  Pulse: 66 (05/28/25 1700)  Resp: (!) 38 (05/28/25 1700)  BP: (!) 113/57 (05/28/25 1700)  SpO2: (!) 93 % (05/28/25 1700) Vital Signs (24h Range):  Temp:  [97.5 °F (36.4 °C)-97.8 °F (36.6 °C)] 97.8 °F (36.6 °C)  Pulse:  [] 66  Resp:  [19-54] 38  SpO2:  [87 %-100 %] 93 %  BP: ()/(0-68) 113/57  Arterial Line BP: ()/(16-59) 102/48     Weight: 83.9 kg (185 lb)  Body mass index is 23.75 kg/m².    SpO2: (!) 93 %         Intake/Output Summary (Last 24 hours) at 5/28/2025 1737  Last data filed at 5/28/2025 1700  Gross per 24 hour   Intake 102.56 ml   Output --   Net 102.56 ml       Lines/Drains/Airways       Central Venous Catheter Line  Duration             Percutaneous Central Line - Triple Lumen  05/28/25 1305 <1 day              Arterial Line  Duration             Arterial Line 05/28/25 1254 Right Radial <1 day              Peripheral Intravenous Line  Duration                  Peripheral IV - Single Lumen 05/28/25 1140 20 G Right Antecubital <1 day         Peripheral IV - Single Lumen 05/28/25 1230 20 G No Anterior;Left Forearm <1 day                     Physical Exam  Constitutional:       Appearance: Normal appearance.   HENT:      Mouth/Throat:      Mouth: Mucous membranes are moist.   Eyes:      Extraocular Movements: Extraocular movements intact.      Conjunctiva/sclera: Conjunctivae normal.   Cardiovascular:      Rate and Rhythm: Normal rate and regular rhythm.   Pulmonary:      Effort: Pulmonary effort is  normal.   Abdominal:      General: Abdomen is flat.      Palpations: Abdomen is soft.   Musculoskeletal:      Right lower leg: No edema.      Left lower leg: No edema.   Skin:     General: Skin is warm and dry.   Neurological:      Mental Status: He is alert.          Significant Labs: All pertinent lab results from the last 24 hours have been reviewed.    Significant Imaging: Reviewed  Assessment and Plan:     * Anaphylaxis  Prior to TAVR procedure, peripheral IV inserted with normal saline injected. Patient then became hypotensive, dyspneic, and diaphoretic, with rasha cross torso. Placed on NRB and given solumedrol, benadryl, and nebs. S/p omar, then started on epi gtt and levo gtt. Patient admitted to the ICU, since improved with ability to wean o2 and pressors. Unknown source, no medication administered prior to reaction. No h/o anaphylaxis, no significant allergies.   - Infectious workup ordered  - Continue Epi, wean as tolerated  - Albuterol nebs scheduled  - Continue to monitor in the ICU    Aortic stenosis  H/o severe AS, presented for outpatient TAVR. Deferred due to anaphylactic reaction pre-procedure.   - NPO MN for possible TAVR tomorrow, discuss with IC in the morning     Essential hypertension  - Hold antihypertensives     COPD (chronic obstructive pulmonary disease)  - Albuterol nebs scheduled     Hyperlipemia  - Continue statin        Yajaira Gayle MD  Cardiology   [facility]

## 2025-05-28 NOTE — Clinical Note
Addended by: YANIV MEYERS on: 3/13/2024 10:31 AM     Modules accepted: Orders     The closure device was deployed in the left femoral artery.

## 2025-05-28 NOTE — EICU
Called to the room for Time out    [x] Verified patient name   [x] Verified patient   [x] Read from armband    Arterial line    Procedure checklist: Time out flowsheet complete

## 2025-05-28 NOTE — EICU
"Virtual ICU Admission    Admit Date: 2025  LOS: 0  Code Status: No Order   : 1945  Bed: 35507/51310 A:     Diagnosis: <principal problem not specified>    Patient  has a past medical history of Alcohol abuse, daily use, Amblyopia, Aortic valve stenosis, Cancer, Cataract, Complication of anesthesia, COPD (chronic obstructive pulmonary disease), Diverticulosis, Gross hematuria, History of colonic polyps, History of malignant melanoma, HTN (hypertension), Hyperlipemia, and Kidney stone.    Last VS: BP (!) 144/66 (BP Location: Right arm, Patient Position: Lying)   Pulse 102   Temp 97.5 °F (36.4 °C) (Temporal)   Resp (!) 22   Ht 6' 2" (1.88 m)   Wt 83.9 kg (185 lb)   SpO2 99%   BMI 23.75 kg/m²       VICU Review    VICU nurse assessment :  [place] completed, LDA documentation reconciliation completed, and VTE prophylaxis review        Nursing orders placed : IP NIHARIKA Peripheral IV Access         "

## 2025-05-29 ENCOUNTER — ANESTHESIA (OUTPATIENT)
Dept: MEDSURG UNIT | Facility: HOSPITAL | Age: 80
End: 2025-05-29
Payer: MEDICARE

## 2025-05-29 LAB
ABSOLUTE EOSINOPHIL (OHS): 0 K/UL
ABSOLUTE EOSINOPHIL (OHS): 0.11 K/UL
ABSOLUTE MONOCYTE (OHS): 1.11 K/UL (ref 0.3–1)
ABSOLUTE MONOCYTE (OHS): 1.88 K/UL (ref 0.3–1)
ABSOLUTE NEUTROPHIL COUNT (OHS): 14.13 K/UL (ref 1.8–7.7)
ABSOLUTE NEUTROPHIL COUNT (OHS): 23.58 K/UL (ref 1.8–7.7)
ALBUMIN SERPL BCP-MCNC: 3.5 G/DL (ref 3.5–5.2)
ALBUMIN SERPL BCP-MCNC: 3.7 G/DL (ref 3.5–5.2)
ALLENS TEST: ABNORMAL
ALLENS TEST: ABNORMAL
ALP SERPL-CCNC: 69 UNIT/L (ref 40–150)
ALP SERPL-CCNC: 73 UNIT/L (ref 40–150)
ALT SERPL W/O P-5'-P-CCNC: 11 UNIT/L (ref 10–44)
ALT SERPL W/O P-5'-P-CCNC: 11 UNIT/L (ref 10–44)
ANION GAP (OHS): 11 MMOL/L (ref 8–16)
ANION GAP (OHS): 14 MMOL/L (ref 8–16)
AST SERPL-CCNC: 17 UNIT/L (ref 11–45)
AST SERPL-CCNC: 18 UNIT/L (ref 11–45)
BASOPHILS # BLD AUTO: 0.02 K/UL
BASOPHILS # BLD AUTO: 0.03 K/UL
BASOPHILS NFR BLD AUTO: 0.1 %
BASOPHILS NFR BLD AUTO: 0.1 %
BILIRUB SERPL-MCNC: 0.3 MG/DL (ref 0.1–1)
BILIRUB SERPL-MCNC: 0.3 MG/DL (ref 0.1–1)
BNP SERPL-MCNC: 284 PG/ML (ref 0–99)
BNP SERPL-MCNC: 307 PG/ML (ref 0–99)
BUN SERPL-MCNC: 26 MG/DL (ref 8–23)
BUN SERPL-MCNC: 29 MG/DL (ref 8–23)
CALCIUM SERPL-MCNC: 8.4 MG/DL (ref 8.7–10.5)
CALCIUM SERPL-MCNC: 8.8 MG/DL (ref 8.7–10.5)
CHLORIDE SERPL-SCNC: 106 MMOL/L (ref 95–110)
CHLORIDE SERPL-SCNC: 108 MMOL/L (ref 95–110)
CO2 SERPL-SCNC: 21 MMOL/L (ref 23–29)
CO2 SERPL-SCNC: 22 MMOL/L (ref 23–29)
CREAT SERPL-MCNC: 1 MG/DL (ref 0.5–1.4)
CREAT SERPL-MCNC: 1.1 MG/DL (ref 0.5–1.4)
DELSYS: ABNORMAL
ERYTHROCYTE [DISTWIDTH] IN BLOOD BY AUTOMATED COUNT: 13.2 % (ref 11.5–14.5)
ERYTHROCYTE [DISTWIDTH] IN BLOOD BY AUTOMATED COUNT: 13.3 % (ref 11.5–14.5)
ERYTHROCYTE [SEDIMENTATION RATE] IN BLOOD BY WESTERGREN METHOD: 18 MM/H
FIBRINOGEN PPP-MCNC: 335 MG/DL (ref 182–400)
FIO2: 100
GFR SERPLBLD CREATININE-BSD FMLA CKD-EPI: >60 ML/MIN/1.73/M2
GFR SERPLBLD CREATININE-BSD FMLA CKD-EPI: >60 ML/MIN/1.73/M2
GLUCOSE SERPL-MCNC: 131 MG/DL (ref 70–110)
GLUCOSE SERPL-MCNC: 144 MG/DL (ref 70–110)
HCO3 UR-SCNC: 24.1 MMOL/L (ref 24–28)
HCT VFR BLD AUTO: 33.2 % (ref 40–54)
HCT VFR BLD AUTO: 34.2 % (ref 40–54)
HCT VFR BLD CALC: 31 %PCV (ref 36–54)
HGB BLD-MCNC: 11.1 GM/DL (ref 14–18)
HGB BLD-MCNC: 11.5 GM/DL (ref 14–18)
IMM GRANULOCYTES # BLD AUTO: 0.1 K/UL (ref 0–0.04)
IMM GRANULOCYTES # BLD AUTO: 0.25 K/UL (ref 0–0.04)
IMM GRANULOCYTES NFR BLD AUTO: 0.6 % (ref 0–0.5)
IMM GRANULOCYTES NFR BLD AUTO: 0.9 % (ref 0–0.5)
LACTATE SERPL-SCNC: 1.4 MMOL/L (ref 0.5–2.2)
LYMPHOCYTES # BLD AUTO: 1.28 K/UL (ref 1–4.8)
LYMPHOCYTES # BLD AUTO: 1.86 K/UL (ref 1–4.8)
MAGNESIUM SERPL-MCNC: 2.1 MG/DL (ref 1.6–2.6)
MAGNESIUM SERPL-MCNC: 2.2 MG/DL (ref 1.6–2.6)
MCH RBC QN AUTO: 29.8 PG (ref 27–31)
MCH RBC QN AUTO: 30.3 PG (ref 27–31)
MCHC RBC AUTO-ENTMCNC: 33.4 G/DL (ref 32–36)
MCHC RBC AUTO-ENTMCNC: 33.6 G/DL (ref 32–36)
MCV RBC AUTO: 89 FL (ref 82–98)
MCV RBC AUTO: 91 FL (ref 82–98)
MODE: ABNORMAL
NUCLEATED RBC (/100WBC) (OHS): 0 /100 WBC
NUCLEATED RBC (/100WBC) (OHS): 0 /100 WBC
OHS QRS DURATION: 100 MS
OHS QTC CALCULATION: 450 MS
PCO2 BLDA: 47.4 MMHG (ref 35–45)
PEEP: 5
PH SMN: 7.31 [PH] (ref 7.35–7.45)
PHOSPHATE SERPL-MCNC: 3.4 MG/DL (ref 2.7–4.5)
PLATELET # BLD AUTO: 243 K/UL (ref 150–450)
PLATELET # BLD AUTO: 277 K/UL (ref 150–450)
PMV BLD AUTO: 9.4 FL (ref 9.2–12.9)
PMV BLD AUTO: 9.5 FL (ref 9.2–12.9)
PO2 BLDA: 284 MMHG (ref 80–100)
PO2 BLDA: 34 MMHG (ref 40–60)
POC BE: -2 MMOL/L (ref -2–2)
POC IONIZED CALCIUM: 1.13 MMOL/L (ref 1.06–1.42)
POC SATURATED O2: 100 % (ref 95–100)
POC SATURATED O2: 67 % (ref 95–100)
POC TCO2: 26 MMOL/L (ref 23–27)
POCT GLUCOSE: 148 MG/DL (ref 70–110)
POCT GLUCOSE: 160 MG/DL (ref 70–110)
POTASSIUM BLD-SCNC: 3.8 MMOL/L (ref 3.5–5.1)
POTASSIUM SERPL-SCNC: 3.5 MMOL/L (ref 3.5–5.1)
POTASSIUM SERPL-SCNC: 4.1 MMOL/L (ref 3.5–5.1)
PROT SERPL-MCNC: 6.2 GM/DL (ref 6–8.4)
PROT SERPL-MCNC: 6.6 GM/DL (ref 6–8.4)
RBC # BLD AUTO: 3.66 M/UL (ref 4.6–6.2)
RBC # BLD AUTO: 3.86 M/UL (ref 4.6–6.2)
RELATIVE EOSINOPHIL (OHS): 0 %
RELATIVE EOSINOPHIL (OHS): 0.4 %
RELATIVE LYMPHOCYTE (OHS): 6.7 % (ref 18–48)
RELATIVE LYMPHOCYTE (OHS): 7.7 % (ref 18–48)
RELATIVE MONOCYTE (OHS): 6.7 % (ref 4–15)
RELATIVE MONOCYTE (OHS): 6.8 % (ref 4–15)
RELATIVE NEUTROPHIL (OHS): 84.9 % (ref 38–73)
RELATIVE NEUTROPHIL (OHS): 85.1 % (ref 38–73)
SAMPLE: ABNORMAL
SAMPLE: ABNORMAL
SITE: ABNORMAL
SITE: ABNORMAL
SODIUM BLD-SCNC: 141 MMOL/L (ref 136–145)
SODIUM SERPL-SCNC: 140 MMOL/L (ref 136–145)
SODIUM SERPL-SCNC: 142 MMOL/L (ref 136–145)
SP02: 100
VT: 500
WBC # BLD AUTO: 16.64 K/UL (ref 3.9–12.7)
WBC # BLD AUTO: 27.71 K/UL (ref 3.9–12.7)

## 2025-05-29 PROCEDURE — 83735 ASSAY OF MAGNESIUM: CPT

## 2025-05-29 PROCEDURE — 93010 ELECTROCARDIOGRAM REPORT: CPT | Mod: 76,,, | Performed by: INTERNAL MEDICINE

## 2025-05-29 PROCEDURE — 20000000 HC ICU ROOM

## 2025-05-29 PROCEDURE — 85025 COMPLETE CBC W/AUTO DIFF WBC: CPT

## 2025-05-29 PROCEDURE — 83605 ASSAY OF LACTIC ACID: CPT | Performed by: STUDENT IN AN ORGANIZED HEALTH CARE EDUCATION/TRAINING PROGRAM

## 2025-05-29 PROCEDURE — 93005 ELECTROCARDIOGRAM TRACING: CPT

## 2025-05-29 PROCEDURE — 82330 ASSAY OF CALCIUM: CPT

## 2025-05-29 PROCEDURE — 63600175 PHARM REV CODE 636 W HCPCS: Performed by: STUDENT IN AN ORGANIZED HEALTH CARE EDUCATION/TRAINING PROGRAM

## 2025-05-29 PROCEDURE — 0BH17EZ INSERTION OF ENDOTRACHEAL AIRWAY INTO TRACHEA, VIA NATURAL OR ARTIFICIAL OPENING: ICD-10-PCS | Performed by: INTERNAL MEDICINE

## 2025-05-29 PROCEDURE — 83880 ASSAY OF NATRIURETIC PEPTIDE: CPT | Performed by: INTERNAL MEDICINE

## 2025-05-29 PROCEDURE — 63600175 PHARM REV CODE 636 W HCPCS: Performed by: INTERNAL MEDICINE

## 2025-05-29 PROCEDURE — 25000003 PHARM REV CODE 250

## 2025-05-29 PROCEDURE — 99900035 HC TECH TIME PER 15 MIN (STAT)

## 2025-05-29 PROCEDURE — 37000009 HC ANESTHESIA EA ADD 15 MINS: Performed by: INTERNAL MEDICINE

## 2025-05-29 PROCEDURE — 93010 ELECTROCARDIOGRAM REPORT: CPT | Mod: ,,, | Performed by: INTERNAL MEDICINE

## 2025-05-29 PROCEDURE — 25000003 PHARM REV CODE 250: Performed by: STUDENT IN AN ORGANIZED HEALTH CARE EDUCATION/TRAINING PROGRAM

## 2025-05-29 PROCEDURE — 25000242 PHARM REV CODE 250 ALT 637 W/ HCPCS: Performed by: STUDENT IN AN ORGANIZED HEALTH CARE EDUCATION/TRAINING PROGRAM

## 2025-05-29 PROCEDURE — 04FL3ZZ FRAGMENTATION OF LEFT FEMORAL ARTERY, PERCUTANEOUS APPROACH: ICD-10-PCS | Performed by: INTERNAL MEDICINE

## 2025-05-29 PROCEDURE — 37220 PR REVASCULARIZE ILIAC ARTERY,ANGIOPLASTY, INITIAL VESSEL: CPT | Mod: 59,LT,, | Performed by: INTERNAL MEDICINE

## 2025-05-29 PROCEDURE — 04FD3ZZ FRAGMENTATION OF LEFT COMMON ILIAC ARTERY, PERCUTANEOUS APPROACH: ICD-10-PCS | Performed by: INTERNAL MEDICINE

## 2025-05-29 PROCEDURE — 94640 AIRWAY INHALATION TREATMENT: CPT

## 2025-05-29 PROCEDURE — 33361 REPLACE AORTIC VALVE PERQ: CPT | Mod: Q0,62,, | Performed by: INTERNAL MEDICINE

## 2025-05-29 PROCEDURE — 85384 FIBRINOGEN ACTIVITY: CPT | Performed by: STUDENT IN AN ORGANIZED HEALTH CARE EDUCATION/TRAINING PROGRAM

## 2025-05-29 PROCEDURE — 94002 VENT MGMT INPAT INIT DAY: CPT

## 2025-05-29 PROCEDURE — 33361 REPLACE AORTIC VALVE PERQ: CPT | Mod: Q0,62,, | Performed by: STUDENT IN AN ORGANIZED HEALTH CARE EDUCATION/TRAINING PROGRAM

## 2025-05-29 PROCEDURE — 37224 HC FEM/POPL REVAS W/TLA: CPT | Mod: LT | Performed by: INTERNAL MEDICINE

## 2025-05-29 PROCEDURE — 63600175 PHARM REV CODE 636 W HCPCS

## 2025-05-29 PROCEDURE — 99233 SBSQ HOSP IP/OBS HIGH 50: CPT | Mod: ,,, | Performed by: INTERNAL MEDICINE

## 2025-05-29 PROCEDURE — 25000242 PHARM REV CODE 250 ALT 637 W/ HCPCS

## 2025-05-29 PROCEDURE — 37224 PR FEM/POPL REVAS W/TLA: CPT | Mod: LT,,, | Performed by: INTERNAL MEDICINE

## 2025-05-29 PROCEDURE — 85014 HEMATOCRIT: CPT

## 2025-05-29 PROCEDURE — 84075 ASSAY ALKALINE PHOSPHATASE: CPT | Performed by: STUDENT IN AN ORGANIZED HEALTH CARE EDUCATION/TRAINING PROGRAM

## 2025-05-29 PROCEDURE — 25000003 PHARM REV CODE 250: Performed by: INTERNAL MEDICINE

## 2025-05-29 PROCEDURE — C9764 REVASC INTRAVASC LITHOTRIPSY: HCPCS | Mod: LT | Performed by: INTERNAL MEDICINE

## 2025-05-29 PROCEDURE — 047D3ZZ DILATION OF LEFT COMMON ILIAC ARTERY, PERCUTANEOUS APPROACH: ICD-10-PCS | Performed by: INTERNAL MEDICINE

## 2025-05-29 PROCEDURE — 27800903 OPTIME MED/SURG SUP & DEVICES OTHER IMPLANTS: Performed by: INTERNAL MEDICINE

## 2025-05-29 PROCEDURE — 27201423 OPTIME MED/SURG SUP & DEVICES STERILE SUPPLY: Performed by: INTERNAL MEDICINE

## 2025-05-29 PROCEDURE — C1760 CLOSURE DEV, VASC: HCPCS | Performed by: INTERNAL MEDICINE

## 2025-05-29 PROCEDURE — 37799 UNLISTED PX VASCULAR SURGERY: CPT

## 2025-05-29 PROCEDURE — C1725 CATH, TRANSLUMIN NON-LASER: HCPCS | Performed by: INTERNAL MEDICINE

## 2025-05-29 PROCEDURE — 83880 ASSAY OF NATRIURETIC PEPTIDE: CPT | Performed by: STUDENT IN AN ORGANIZED HEALTH CARE EDUCATION/TRAINING PROGRAM

## 2025-05-29 PROCEDURE — 047J3ZZ DILATION OF LEFT EXTERNAL ILIAC ARTERY, PERCUTANEOUS APPROACH: ICD-10-PCS | Performed by: INTERNAL MEDICINE

## 2025-05-29 PROCEDURE — C1894 INTRO/SHEATH, NON-LASER: HCPCS | Performed by: INTERNAL MEDICINE

## 2025-05-29 PROCEDURE — 33361 REPLACE AORTIC VALVE PERQ: CPT | Mod: Q0 | Performed by: INTERNAL MEDICINE

## 2025-05-29 PROCEDURE — 25500020 PHARM REV CODE 255: Performed by: INTERNAL MEDICINE

## 2025-05-29 PROCEDURE — 04FJ3ZZ FRAGMENTATION OF LEFT EXTERNAL ILIAC ARTERY, PERCUTANEOUS APPROACH: ICD-10-PCS | Performed by: INTERNAL MEDICINE

## 2025-05-29 PROCEDURE — 82803 BLOOD GASES ANY COMBINATION: CPT

## 2025-05-29 PROCEDURE — 84132 ASSAY OF SERUM POTASSIUM: CPT

## 2025-05-29 PROCEDURE — 83735 ASSAY OF MAGNESIUM: CPT | Performed by: STUDENT IN AN ORGANIZED HEALTH CARE EDUCATION/TRAINING PROGRAM

## 2025-05-29 PROCEDURE — 02RF38Z REPLACEMENT OF AORTIC VALVE WITH ZOOPLASTIC TISSUE, PERCUTANEOUS APPROACH: ICD-10-PCS | Performed by: INTERNAL MEDICINE

## 2025-05-29 PROCEDURE — 94761 N-INVAS EAR/PLS OXIMETRY MLT: CPT

## 2025-05-29 PROCEDURE — C1769 GUIDE WIRE: HCPCS | Performed by: INTERNAL MEDICINE

## 2025-05-29 PROCEDURE — 84295 ASSAY OF SERUM SODIUM: CPT

## 2025-05-29 PROCEDURE — 85025 COMPLETE CBC W/AUTO DIFF WBC: CPT | Performed by: STUDENT IN AN ORGANIZED HEALTH CARE EDUCATION/TRAINING PROGRAM

## 2025-05-29 PROCEDURE — 99900026 HC AIRWAY MAINTENANCE (STAT)

## 2025-05-29 PROCEDURE — 27100171 HC OXYGEN HIGH FLOW UP TO 24 HOURS

## 2025-05-29 PROCEDURE — 93010 ELECTROCARDIOGRAM REPORT: CPT | Mod: ,,, | Performed by: STUDENT IN AN ORGANIZED HEALTH CARE EDUCATION/TRAINING PROGRAM

## 2025-05-29 PROCEDURE — 5A1935Z RESPIRATORY VENTILATION, LESS THAN 24 CONSECUTIVE HOURS: ICD-10-PCS | Performed by: INTERNAL MEDICINE

## 2025-05-29 PROCEDURE — 80053 COMPREHEN METABOLIC PANEL: CPT

## 2025-05-29 PROCEDURE — 84100 ASSAY OF PHOSPHORUS: CPT | Performed by: STUDENT IN AN ORGANIZED HEALTH CARE EDUCATION/TRAINING PROGRAM

## 2025-05-29 PROCEDURE — 37000008 HC ANESTHESIA 1ST 15 MINUTES: Performed by: INTERNAL MEDICINE

## 2025-05-29 PROCEDURE — C1887 CATHETER, GUIDING: HCPCS | Performed by: INTERNAL MEDICINE

## 2025-05-29 DEVICE — VALVE SAPIEN S3 COMM KIT TF 29: Type: IMPLANTABLE DEVICE | Site: HEART | Status: FUNCTIONAL

## 2025-05-29 RX ORDER — DEXMEDETOMIDINE HYDROCHLORIDE 4 UG/ML
0-1.4 INJECTION, SOLUTION INTRAVENOUS CONTINUOUS
Status: DISCONTINUED | OUTPATIENT
Start: 2025-05-29 | End: 2025-05-30

## 2025-05-29 RX ORDER — CEFAZOLIN SODIUM 1 G/3ML
INJECTION, POWDER, FOR SOLUTION INTRAMUSCULAR; INTRAVENOUS
Status: DISCONTINUED | OUTPATIENT
Start: 2025-05-29 | End: 2025-05-29

## 2025-05-29 RX ORDER — FENTANYL CITRATE 50 UG/ML
50 INJECTION, SOLUTION INTRAMUSCULAR; INTRAVENOUS ONCE
Refills: 0 | Status: COMPLETED | OUTPATIENT
Start: 2025-05-29 | End: 2025-05-29

## 2025-05-29 RX ORDER — SODIUM CHLORIDE 9 MG/ML
INJECTION, SOLUTION INTRAVENOUS CONTINUOUS
Status: ACTIVE | OUTPATIENT
Start: 2025-05-29 | End: 2025-05-29

## 2025-05-29 RX ORDER — FENTANYL CITRATE 50 UG/ML
INJECTION, SOLUTION INTRAMUSCULAR; INTRAVENOUS
Status: COMPLETED
Start: 2025-05-29 | End: 2025-05-29

## 2025-05-29 RX ORDER — ONDANSETRON HYDROCHLORIDE 2 MG/ML
INJECTION, SOLUTION INTRAVENOUS
Status: DISCONTINUED | OUTPATIENT
Start: 2025-05-29 | End: 2025-05-29

## 2025-05-29 RX ORDER — HEPARIN SODIUM 1000 [USP'U]/ML
INJECTION, SOLUTION INTRAVENOUS; SUBCUTANEOUS
Status: DISCONTINUED | OUTPATIENT
Start: 2025-05-29 | End: 2025-05-29

## 2025-05-29 RX ORDER — POTASSIUM CHLORIDE 750 MG/1
50 CAPSULE, EXTENDED RELEASE ORAL ONCE
Status: COMPLETED | OUTPATIENT
Start: 2025-05-29 | End: 2025-05-29

## 2025-05-29 RX ORDER — NOREPINEPHRINE BITARTRATE/D5W 4MG/250ML
PLASTIC BAG, INJECTION (ML) INTRAVENOUS
Status: DISPENSED
Start: 2025-05-29 | End: 2025-05-30

## 2025-05-29 RX ORDER — NOREPINEPHRINE BITARTRATE/D5W 4MG/250ML
0-3 PLASTIC BAG, INJECTION (ML) INTRAVENOUS CONTINUOUS
Status: DISCONTINUED | OUTPATIENT
Start: 2025-05-29 | End: 2025-05-30

## 2025-05-29 RX ORDER — CLOPIDOGREL BISULFATE 75 MG/1
75 TABLET ORAL DAILY
Status: DISCONTINUED | OUTPATIENT
Start: 2025-05-29 | End: 2025-06-04 | Stop reason: HOSPADM

## 2025-05-29 RX ORDER — FUROSEMIDE 10 MG/ML
INJECTION INTRAMUSCULAR; INTRAVENOUS
Status: DISCONTINUED | OUTPATIENT
Start: 2025-05-29 | End: 2025-05-29

## 2025-05-29 RX ORDER — ALBUTEROL SULFATE 90 UG/1
INHALANT RESPIRATORY (INHALATION)
Status: DISCONTINUED | OUTPATIENT
Start: 2025-05-29 | End: 2025-05-29

## 2025-05-29 RX ORDER — DEXMEDETOMIDINE HYDROCHLORIDE 4 UG/ML
INJECTION, SOLUTION INTRAVENOUS
Status: COMPLETED
Start: 2025-05-29 | End: 2025-05-29

## 2025-05-29 RX ORDER — PROTAMINE SULFATE 10 MG/ML
INJECTION, SOLUTION INTRAVENOUS
Status: DISCONTINUED | OUTPATIENT
Start: 2025-05-29 | End: 2025-05-29

## 2025-05-29 RX ORDER — ONDANSETRON HYDROCHLORIDE 2 MG/ML
4 INJECTION, SOLUTION INTRAVENOUS ONCE
Status: DISCONTINUED | OUTPATIENT
Start: 2025-05-29 | End: 2025-05-29

## 2025-05-29 RX ORDER — ACETAMINOPHEN 325 MG/1
650 TABLET ORAL EVERY 4 HOURS PRN
Status: DISCONTINUED | OUTPATIENT
Start: 2025-05-29 | End: 2025-06-04 | Stop reason: HOSPADM

## 2025-05-29 RX ORDER — DEXMEDETOMIDINE HYDROCHLORIDE 100 UG/ML
INJECTION, SOLUTION INTRAVENOUS
Status: DISCONTINUED | OUTPATIENT
Start: 2025-05-29 | End: 2025-05-29

## 2025-05-29 RX ORDER — LIDOCAINE HYDROCHLORIDE 20 MG/ML
INJECTION, SOLUTION EPIDURAL; INFILTRATION; INTRACAUDAL; PERINEURAL
Status: DISCONTINUED | OUTPATIENT
Start: 2025-05-29 | End: 2025-05-29 | Stop reason: HOSPADM

## 2025-05-29 RX ORDER — DIPHENHYDRAMINE HCL 25 MG
50 CAPSULE ORAL ONCE
Status: COMPLETED | OUTPATIENT
Start: 2025-05-29 | End: 2025-05-29

## 2025-05-29 RX ORDER — PROPOFOL 10 MG/ML
VIAL (ML) INTRAVENOUS CONTINUOUS PRN
Status: DISCONTINUED | OUTPATIENT
Start: 2025-05-29 | End: 2025-05-29

## 2025-05-29 RX ORDER — PROPOFOL 10 MG/ML
0-50 INJECTION, EMULSION INTRAVENOUS CONTINUOUS
Status: DISCONTINUED | OUTPATIENT
Start: 2025-05-29 | End: 2025-05-30

## 2025-05-29 RX ORDER — FENTANYL CITRATE 50 UG/ML
INJECTION, SOLUTION INTRAMUSCULAR; INTRAVENOUS
Status: DISCONTINUED | OUTPATIENT
Start: 2025-05-29 | End: 2025-05-29

## 2025-05-29 RX ORDER — IPRATROPIUM BROMIDE AND ALBUTEROL SULFATE 2.5; .5 MG/3ML; MG/3ML
3 SOLUTION RESPIRATORY (INHALATION) EVERY 4 HOURS
Status: DISCONTINUED | OUTPATIENT
Start: 2025-05-29 | End: 2025-06-04 | Stop reason: HOSPADM

## 2025-05-29 RX ADMIN — IPRATROPIUM BROMIDE AND ALBUTEROL SULFATE 3 ML: 2.5; .5 SOLUTION RESPIRATORY (INHALATION) at 08:05

## 2025-05-29 RX ADMIN — PROPOFOL 30 MCG/KG/MIN: 10 INJECTION, EMULSION INTRAVENOUS at 07:05

## 2025-05-29 RX ADMIN — NOREPINEPHRINE BITARTRATE 0.03 MCG/KG/MIN: 4 INJECTION, SOLUTION INTRAVENOUS at 06:05

## 2025-05-29 RX ADMIN — PROTAMINE SULFATE 100 MG: 10 INJECTION, SOLUTION INTRAVENOUS at 02:05

## 2025-05-29 RX ADMIN — DIPHENHYDRAMINE HYDROCHLORIDE 50 MG: 25 CAPSULE ORAL at 12:05

## 2025-05-29 RX ADMIN — FENTANYL CITRATE 50 MCG: 50 INJECTION INTRAMUSCULAR; INTRAVENOUS at 02:05

## 2025-05-29 RX ADMIN — MUPIROCIN: 20 OINTMENT TOPICAL at 08:05

## 2025-05-29 RX ADMIN — MUPIROCIN: 20 OINTMENT TOPICAL at 09:05

## 2025-05-29 RX ADMIN — FENTANYL CITRATE 50 MCG: 50 INJECTION, SOLUTION INTRAMUSCULAR; INTRAVENOUS at 04:05

## 2025-05-29 RX ADMIN — FENTANYL CITRATE 100 MCG: 50 INJECTION INTRAMUSCULAR; INTRAVENOUS at 03:05

## 2025-05-29 RX ADMIN — IPRATROPIUM BROMIDE AND ALBUTEROL SULFATE 3 ML: 2.5; .5 SOLUTION RESPIRATORY (INHALATION) at 03:05

## 2025-05-29 RX ADMIN — FENTANYL CITRATE 50 MCG: 50 INJECTION INTRAMUSCULAR; INTRAVENOUS at 01:05

## 2025-05-29 RX ADMIN — CLOPIDOGREL BISULFATE 75 MG: 75 TABLET, FILM COATED ORAL at 10:05

## 2025-05-29 RX ADMIN — FENTANYL CITRATE 50 MCG: 50 INJECTION INTRAMUSCULAR; INTRAVENOUS at 04:05

## 2025-05-29 RX ADMIN — PROPOFOL 50 MCG/KG/MIN: 10 INJECTION, EMULSION INTRAVENOUS at 01:05

## 2025-05-29 RX ADMIN — IPRATROPIUM BROMIDE AND ALBUTEROL SULFATE 3 ML: 2.5; .5 SOLUTION RESPIRATORY (INHALATION) at 11:05

## 2025-05-29 RX ADMIN — HEPARIN SODIUM 10000 UNITS: 1000 INJECTION, SOLUTION INTRAVENOUS; SUBCUTANEOUS at 01:05

## 2025-05-29 RX ADMIN — DEXMEDETOMIDINE HYDROCHLORIDE 1.4 MCG/KG/HR: 4 INJECTION INTRAVENOUS at 04:05

## 2025-05-29 RX ADMIN — ALBUTEROL SULFATE 5 PUFF: 108 INHALANT RESPIRATORY (INHALATION) at 03:05

## 2025-05-29 RX ADMIN — IPRATROPIUM BROMIDE AND ALBUTEROL SULFATE 3 ML: 2.5; .5 SOLUTION RESPIRATORY (INHALATION) at 07:05

## 2025-05-29 RX ADMIN — CEFAZOLIN 2 G: 330 INJECTION, POWDER, FOR SOLUTION INTRAMUSCULAR; INTRAVENOUS at 01:05

## 2025-05-29 RX ADMIN — BICALUTAMIDE 50 MG: 50 TABLET ORAL at 09:05

## 2025-05-29 RX ADMIN — PRAVASTATIN SODIUM 40 MG: 40 TABLET ORAL at 09:05

## 2025-05-29 RX ADMIN — LIDOCAINE HYDROCHLORIDE 100 ML: 20 INJECTION, SOLUTION EPIDURAL; INFILTRATION; INTRACAUDAL; PERINEURAL at 02:05

## 2025-05-29 RX ADMIN — DEXMEDETOMIDINE 8 MCG: 200 INJECTION, SOLUTION INTRAVENOUS at 01:05

## 2025-05-29 RX ADMIN — GLYCOPYRROLATE 0.2 MG: 0.2 INJECTION INTRAMUSCULAR; INTRAVENOUS at 01:05

## 2025-05-29 RX ADMIN — HYDROCORTISONE SODIUM SUCCINATE 50 MG: 100 INJECTION, POWDER, FOR SOLUTION INTRAMUSCULAR; INTRAVENOUS at 01:05

## 2025-05-29 RX ADMIN — PROPOFOL 50 MCG/KG/MIN: 10 INJECTION, EMULSION INTRAVENOUS at 11:05

## 2025-05-29 RX ADMIN — POTASSIUM CHLORIDE 50 MEQ: 750 CAPSULE, EXTENDED RELEASE ORAL at 05:05

## 2025-05-29 RX ADMIN — PROPOFOL 50 MCG/KG/MIN: 10 INJECTION, EMULSION INTRAVENOUS at 04:05

## 2025-05-29 RX ADMIN — IPRATROPIUM BROMIDE AND ALBUTEROL SULFATE 3 ML: 2.5; .5 SOLUTION RESPIRATORY (INHALATION) at 12:05

## 2025-05-29 RX ADMIN — DEXMEDETOMIDINE HYDROCHLORIDE 0.8 MCG/KG/HR: 4 INJECTION INTRAVENOUS at 08:05

## 2025-05-29 RX ADMIN — NOREPINEPHRINE BITARTRATE 0.04 MCG/KG/MIN: 1 INJECTION, SOLUTION, CONCENTRATE INTRAVENOUS at 01:05

## 2025-05-29 RX ADMIN — ONDANSETRON 4 MG: 2 INJECTION INTRAMUSCULAR; INTRAVENOUS at 01:05

## 2025-05-29 RX ADMIN — FUROSEMIDE 40 MG: 10 INJECTION, SOLUTION INTRAMUSCULAR; INTRAVENOUS at 02:05

## 2025-05-29 NOTE — PROGRESS NOTES
Brief Operative Note:    : Gume Blood MD     Referring Physician: Gume Blood     All Operators: Surgeon(s):  Yajaira Gayle MD Davis, Arthur P Jr., Fredrick Luis MD Singh, Saket, MD Tafur Soto, Jose D., MD     Preoperative Diagnosis: Severe aortic stenosis [I35.0]     Postop Diagnosis: Severe aortic stenosis [I35.0]    Treatments/Procedures: Procedure(s) (LRB):  REPLACEMENT, AORTIC VALVE, TRANSCATHETER (TAVR) (N/A)  Cardiac Cath Cosurgeon (N/A)  REPLACEMENT, AORTIC VALVE, TRANSCATHETER (TAVR)  PTA, Iliac Artery    Access: Right CFA, Left CFA, Left radial artery    Findings:Severe structural heart disease is present.     See catheterization report for full details.    Intervention: TAVR 29mm Kathia S3    See catheterization report for full details.    Closure device: Manual pressure, Perclose     Plan:  - Post cath protocol   - IVF @ 150 cc/hr x 4 hours  - Bed rest x 4 hours   - Follow up with outpatient cardiologist    Estimated Blood loss: 20 cc    Specimens removed: Syeda Gayle  Cardiovascular Disease fellow, PGY-4  [facility]

## 2025-05-29 NOTE — ANESTHESIA POSTPROCEDURE EVALUATION
Anesthesia Post Evaluation    Patient: Dutch Olivier    Procedure(s) Performed: Procedure(s) (LRB):  REPLACEMENT, AORTIC VALVE, TRANSCATHETER (TAVR) (N/A)  Cardiac Cath Cosurgeon (N/A)  REPLACEMENT, AORTIC VALVE, TRANSCATHETER (TAVR)  PTA, Iliac Artery    Final Anesthesia Type: general      Patient location during evaluation: ICU  Patient participation: No - Unable to Participate, Sedation  Level of consciousness: sedated  Post-procedure vital signs: reviewed and stable  Pain management: adequate  Airway patency: patent    PONV status at discharge: No PONV  Anesthetic complications: yes  Perioperative Events: aspiration        Cardiovascular status: hemodynamically stable  Respiratory status: ventilator and intubated  Hydration status: euvolemic                Vitals Value Taken Time   /43 05/29/25 15:52   Temp  05/29/25 15:52   Pulse 84 05/29/25 15:52   Resp 16 05/29/25 15:52   SpO2 98% 05/29/25 15:52         No case tracking events are documented in the log.      Pain/Oliver Score: Oliver Score: 10 (5/29/2025  1:17 PM)

## 2025-05-29 NOTE — TRANSFER OF CARE
"Anesthesia Transfer of Care Note    Patient: Dutch Olivier    Procedure(s) Performed: Procedure(s) (LRB):  REPLACEMENT, AORTIC VALVE, TRANSCATHETER (TAVR) (N/A)  Cardiac Cath Cosurgeon (N/A)  REPLACEMENT, AORTIC VALVE, TRANSCATHETER (TAVR)  PTA, Iliac Artery    Patient location: ICU    Anesthesia Type: general    Transport from OR: Upon arrival to PACU/ICU, patient attached to ventilator and auscultated to confirm bilateral breath sounds and adequate TV. Continuos invasive BP monitoring in transport. Continuous SpO2 monitoring in transport. Continuous ECG monitoring in transport. Transported from OR intubated on 100% O2 by AMBU with adequate controlled ventilation    Post pain: adequate analgesia    Post vital signs: stable    Level of consciousness: sedated    Nausea/Vomiting: vomiting    Complications: persistent nausea and vomiting    Transfer of care protocol was followedComments: Pt likely aspirated during procedure and was intubated for airway protection. Suctioned dark red secretions from OG and ETT with improvement in O2 saturations. Transported to SICU intubated/sedated on 100% O2 via Ambu bag.      Last vitals: Visit Vitals  BP (!) 121/58   Pulse (!) 57   Temp 36.9 °C (98.4 °F) (Oral)   Resp (!) 26   Ht 6' 2" (1.88 m)   Wt 83.9 kg (185 lb)   SpO2 96%   BMI 23.75 kg/m²     "

## 2025-05-29 NOTE — PLAN OF CARE
SICU PLAN OF CARE    Dx: Anaphylaxis    Goals of Care: MAP > 65    Vital Signs (last 12 hours):   Temp:  [97.8 °F (36.6 °C)-98.9 °F (37.2 °C)]   Pulse:  []   Resp:  [15-61]   BP: ()/(46-63)   SpO2:  [92 %-99 %]   Arterial Line BP: ()/()      Neuro: AAOx4, Follows commands , and Moves all extremities spontaneously     Cardiac: Rhythm: normal sinus rhythm    Respiratory: Room Air    Gtts: Epinephrine     Urine Output: Voids Spontaneously  325  mL/shift. MD Dr. Bui aware.    Drains: none    Diet: Cardiac , NPO after midnight    Family Support daughter at bedside     Labs/Accuchecks: daily labs    Skin:  skin and incisions per documentation.  Patient turned q2h, bony prominences protected, and mattress inflated/working correctly.     Shift Events:  Pt with brief and intermittent sinus bradycardia FR 49-54, not sustained. Pt asleep and asymptomatic each time. MD Dr. Bui notified, MD deemed rhythm sinus bradycardia , instructed to continue to titrate Epi gtt down and monitor BP and respiratory status. Orders followed. Family updated on current condition/plan of care, questions answered, and emotional support provided. MD updated on current condition, vitals, labs, and gtts.

## 2025-05-29 NOTE — ANESTHESIA PROCEDURE NOTES
April 10, 2025       Kizzy Bishop MD  [address] [place] [address]  Via In Basket      Patient: Violet Barrett   YOB: 1937   Date of Visit: 4/10/2025       Dear Dr. Bishop:    I saw your patient, Violet Barrett, for an evaluation. Below are my notes for this visit with her.    If you have questions, please do not hesitate to call me.    As to anemia- will follow, if lower, then bone marrow biopsy.  Her recent labs you ordered- showed acute hypercalciemia- I defer to you for addl work up.  Thank  you    Repeat labs in 3 mo(cbc)  If lower, then bone marrow.    Sincerely,        Jose G Siddiqi DO        CC: No Recipients    Jose G Siddiqi DO  4/10/2025  3:27 PM  Signed        Profile:  Violet Barrett is a 87 year old was requested by dr. Bishop, Kizzy MITCHELL MD to evaluate for  Anemia (macrocytosis)       History of Present Illness:  Violet Barrett is a 87 year old presents for anemia.   In - hgb  13.1 then in  hgb dropped to10.5.    Since, has had progressive macrocytosis (mcv 2025- 105.3, hgb 10.1)      Subjective:  1) not taking tegretol  2) no bleeding.     Hematology/ Oncologic history:   # macrocytosis     Past medical History: chf, afib, tgn. 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 90 tablet 3   • Temazepam 30 MG capsule Take 1 capsule by mouth nightly as needed for Sleep. 30 capsule 5  Arterial    Diagnosis: Aortic stenosis    Patient location during procedure: done in OR    Staffing  Authorizing Provider: Dutch Urias MD  Performing Provider: Dutch Urias MD    Staffing  Performed by: Dutch Urias MD  Authorized by: Dutch Urias MD    Anesthesiologist was present at the time of the procedure.    Preanesthetic Checklist  Completed: patient identified, IV checked, site marked, risks and benefits discussed, surgical consent, monitors and equipment checked, pre-op evaluation, timeout performed and anesthesia consent givenArterial  Skin Prep: chlorhexidine gluconate  Local Infiltration: lidocaine  Orientation: right  Location: radial    Catheter Size: 20 G  Catheter placement by Ultrasound guidance and Anatomical landmarks. Heme positive aspiration all ports.   Vessel Caliber: patent  Needle advanced into vessel with real time Ultrasound guidance.  Guidewire confirmed in vessel.Insertion Attempts: 2  Assessment  Dressing: secured with tape and tegaderm  Patient: Tolerated well                • apixaBAN (ELIQUIS) 5 MG Tab Take 1 tablet by mouth every 12 hours. 180 tablet 1   • flecainide (TAMBOCOR) 50 MG tablet Take 1 tablet by mouth in the morning and 1 tablet in the evening. 180 tablet 1   • traMADol (ULTRAM) 50 MG tablet Take 1 tablet by mouth every 8 hours as needed for Pain. 30 tablet 2   • metoPROLOL succinate (TOPROL-XL) 25 MG 24 hr tablet Take 1 tablet by mouth at bedtime. 30 tablet 5   • sucralfate (CARAFATE) 1 g tablet Take 1 tablet by mouth 4 times daily. 120 tablet 1   • alendronate (FOSAMAX) 70 MG tablet Take 1 tablet by mouth every 7 days. To be taken as first thing in the morning, with full glass of water, and to be upright for 30 minutes after taking this medication. 12 tablet 3   • omeprazole (PriLOSEC) 40 MG capsule Take 1 capsule by mouth in the morning and 1 capsule in the evening. Take it 30min before meal on an empty stomach. 180 capsule 3   • carBAMazepine (TEGretol) 200 MG tablet Take 2 tablets by mouth in the morning and 2 tablets at noon and 2 tablets in the evening. (Patient not taking: Reported on 4/10/2025) 540 tablet 0   • Omega 3-6-9 Cap Take by mouth daily. 2 tabs       No current facility-administered medications for this visit.         ROS   Complete ros reviewed, see HPI/subjective for positives.    Exam:  Visit Vitals  /62 (BP Location: LUE - Left upper extremity, Patient Position: Sitting, Cuff Size: Regular)   Pulse 75   Temp 98.1 °F (36.7 °C) (Temporal)   Resp 18   Ht 5' 2\" (1.575 m)   Wt 52.3 kg (115 lb 6.6 oz)   SpO2 97%   BMI 21.11 kg/m²         Ecog ps 0  Pain: 0    GEN-nad/ao/af  Data    Labs:  Spep negative  B12/folate/iron wnl    Component      Latest Ref Rng 11/18/2024  10:09 AM 1/6/2025  10:11 AM 4/8/2025  12:50 PM   IRON      50 - 170 mcg/dL 109  135  104    Total Iron Binding Capacity      250 - 450 mcg/dL 175 (L)   188 (L)    PERCENT IRON SATURATION      15 - 45 % 62 (H)   55 (H)       Legend:  (L) Low  (H) High    Component      Latest Ref Rng  2/19/2024  9:48 AM 8/19/2024  11:16 AM 11/18/2024  10:09 AM 1/6/2025  10:11 AM 4/8/2025  12:50 PM   HGB      12.0 - 15.5 g/dL 10.8 (L)  10.1 (L)  10.2 (L)  10.1 (L)  9.8 (L)       Component      Latest Ref Rng 8/19/2024  11:16 AM 11/18/2024  10:09 AM 1/6/2025  10:11 AM 4/8/2025  12:50 PM   MCV      78.0 - 100.0 fl 102.2 (H)  107.7 (H)  105.3 (H)  108.2 (H)       Legend:  (H) High  Legend:  (L) Low  NarrativePerformed by: [place]  Peripheral blood smear shows macrocytic anemia with anisopoikilocytosis and leukopenia.  Occasional, giant platelets are noted.  Clinical correlation is recommended for possible etiologies.    (H) High    Impression:  1) anemia- macrocytosis    Plan:   1) reviewed prior work up:   # no gammopathy   # concerns for possible mds   # yet hgb slightly lower,after shared decision making, hold bone marrow for now, but repeat labs in 3 mo    2) pt will follow up with Dr. Bishop as to high calcium       I spent a total of 22 minutes on this visit, all of which was performed on the date of this encounter. This included:  4   minutes reviewing the EMR with respect to physician visits and tests since our last follow up, personal review of interval radiology studies on PACS, pertinent laboratory results review, and visit note preparation; and 18   minutes in face-to-face time obtaining the interval history and performing the pertinent physical examination, direct patient counseling, coordination of care, and completion of all pertinent documentation.    No orders of the defined types were placed in this encounter.      Patient Instructions     ISSUES:  1 anemia (low red cells, hemoglobin- 12-16)  2. Larger red cells (macrocytes)      Findings      Oct 2020- hgb normal- 12-16==     (H) High    Component      Latest Ref Rng 2/19/2024  9:48 AM 8/19/2024  11:16 AM 11/18/2024  10:09 AM 1/6/2025  10:11 AM 4/8/2025  12:50 PM   HGB      12.0 - 15.5 g/dL 10.8 (L)  10.1 (L)  10.2 (L)  10.1 (L)  9.8 (L)      Size  of red cells (normal )    Component      Latest Ref Rng 2/19/2024  9:48 AM 8/19/2024  11:16 AM 11/18/2024  10:09 AM 1/6/2025  10:11 AM   MCV      78.0 - 100.0 fl 100.0  102.2 (H)  107.7 (H)  105.3 (H)      Causes of large red cells-   # vitamin -b12/folate   # medications (tegretol)   # thyroid disorder   # alcohol   # bone marrow disorder      To do:   1)         PLEASE CALL OFFICE IF ANY CONCERNS [phone]     See hailey in 3 months, labs prior.

## 2025-05-29 NOTE — HPI
Dutch Olivier is a 80 yo M with a history of nonobstructive CAD, COPD, HLD, HTN, prostate ca w bone mets, and severe AS who presented for outpatient TAVR that was complicated by anaphylaxis pre procedure.     Patient presented yesterday for outpatient TAVR. Prior to the procedure, he had a peripheral IV inserted with normal saline injected. He then became acutely dyspneic, tachycardic, and diaphoretic. Diffuse rash ntoed across torso. He became unresponsive, though did not lose a pulse. He was placed on NRB. Given Cesar pushes x3, Epi gtt started. Given solumedrol, benadryl, and albuterol nebs. Concern for possible HF contributing and given Lasix. Levo was also added on and he was tansferred to the ICU under the CCU team.      On inital evaluation in the ICU, patient on Epi and Levo with MAPs >65. Sating well on 6L NC. CVL and ART line placed bedside. He reports feeling well at this time, denying chest pain, dyspnea, pre-syncope. Gtts since weaned with Levo discontinued and Epi 0.1. Sating well on 3L NC.     This AM, patient reports feeling well, though fatigued. Denies denies pre-syncope, syncope, chest pain, palpitations, SOB, DANISH, orthopnea. He has been off Epi since 0400 and is sating well on RA.     The patient has undergone the following TAVR work-up:   ECHO (Date 4.22.25): KAMRAN= 0.9 cm2, MG= 44mmHg, Peak Maxime= 4.2 m/s, EF= 60%.   [facility] (5/16/25): nonobstructive CAD  STS: 5  Frailty: 1/4   Iliacs are >4 on R and > 5 on L (Can be dilated with a 7 mm balloon  LVOT area by CTA is 6.01 cm2 (32 mm X 24 mm) and Avg Diameter is 27.7 per my independent review of images on aaTag.  Incidental findings on CT: Multifocal severe stenosis or occlusion of the bilateral superficial femoral arteries.  4 mm left upper lobe solid nodule.  CT Surgery risk assessment: High risk SAVR  Rhythm issues: None  PFTs: Not done.  KCCQ/5 meter walk: Done in clinic  Comorbidities: PAD, DLD, HTN     Dutch Olivier is a 29 mm  Kathia S3  valve candidate via RTF access with balloon dilation of the REIA or shockwave balloon.

## 2025-05-29 NOTE — EICU
Virtual ICU Quality Rounds    Admit Date: 5/28/2025  Hospital Day: 1    ICU Day: 21h    24H Vital Sign Range:  Temp:  [97.5 °F (36.4 °C)-98.9 °F (37.2 °C)]   Pulse:  []   Resp:  [15-61]   BP: ()/(0-74)   SpO2:  [87 %-100 %]   Arterial Line BP: ()/()     VICU Surveillance Screening    Daily review of  line necessity(optional): Central line(s) in place    Central line type (optional): Triple lumen catheter    Central line indications : Vasopressors (in past 24/hrs) and planned TAVR (rescheduled for today)    LDA reconciliation : Yes    Nursing orders placed : IP NIHARIKA Central Line Care    Pressure injury prevention panel (order)    **Sticky note to physician to update code status

## 2025-05-29 NOTE — PLAN OF CARE
[facility]  Initial Discharge Assessment       Primary Care Provider: David Arreola MD    Admission Diagnosis: Severe aortic stenosis [I35.0]    Admission Date: 5/28/2025  Expected Discharge Date: 6/4/2025    Transition of Care Barriers: None    Payor: [employer] [facility] / Plan: [employer] CHOICES / Product Type: Medicare Advantage /     Extended Emergency Contact Information  Primary Emergency Contact: SoyMariella  Address: [facility] [address]           [place] [address] [place]  Mobile Phone: [phone]  Relation: Spouse  Secondary Emergency Contact: Kim Clarke  Mobile Phone: [phone]  Relation: Daughter    Discharge Plan A: Home with family, Home Health  Discharge Plan B: Home      [facility] - [place] - [address]  [address]  [place] [address]  Phone: [phone] Fax: [phone]    [facility]  [address] [facility] [address]  Phone: [phone] Fax: [phone]    [facility] - [place], [place] - [address]  [place] [address]  Phone: [phone] Fax: [phone]    [facility] [address] [place] [address]  Phone: [phone] Fax: [phone]      Initial Assessment (most recent)       Adult Discharge Assessment - 05/29/25 1020          Discharge Assessment    Assessment Type Discharge Planning Assessment     Confirmed/corrected address, phone number and insurance Yes     Confirmed Demographics Correct on Facesheet     Source of Information patient     Communicated PREETI with patient/caregiver Yes     People in Home spouse     Name(s) of People in Home Mariella Olivier (Spouse)  [phone] (Mobile)     Do you expect to return to your current living situation? Yes     Do you have help at home or someone to help you manage your care at home? Yes     Who are your caregiver(s) and their  phone number(s)? Mariella Olivier (Spouse)  [phone] (Mobile)     Prior to hospitilization cognitive status: Alert/Oriented;No Deficits     Current cognitive status: Alert/Oriented;No Deficits     Walking or Climbing Stairs Difficulty no     Dressing/Bathing Difficulty no     Home Accessibility not wheelchair accessible     Home Layout Able to live on 1st floor     Equipment Currently Used at Home none     Readmission within 30 days? No     Patient currently being followed by outpatient case management? No     Do you currently have service(s) that help you manage your care at home? No     Do you take prescription medications? Yes     Do you have prescription coverage? Yes     Do you have any problems affording any of your prescribed medications? No     Is the patient taking medications as prescribed? yes     Who is going to help you get home at discharge? Mariella Olivier (Spouse)  [phone] (Mobile)     How do you get to doctors appointments? car, drives self     Are you on dialysis? No     Do you take coumadin? No     Discharge Plan A Home with family;Home Health     Discharge Plan B Home     DME Needed Upon Discharge  none     Discharge Plan discussed with: Patient     Transition of Care Barriers None        Physical Activity    On average, how many days per week do you engage in moderate to strenuous exercise (like a brisk walk)? 7 days     On average, how many minutes do you engage in exercise at this level? 60 min        Financial Resource Strain    How hard is it for you to pay for the very basics like food, housing, medical care, and heating? Not hard at all        Housing Stability    In the last 12 months, was there a time when you were not able to pay the mortgage or rent on time? No     At any time in the past 12 months, were you homeless or living in a shelter (including now)? No        Transportation Needs    In the past 12 months, has lack of transportation kept you from medical appointments or from  getting medications? No     In the past 12 months, has lack of transportation kept you from meetings, work, or from getting things needed for daily living? No        Food Insecurity    Within the past 12 months, you worried that your food would run out before you got the money to buy more. Never true     Within the past 12 months, the food you bought just didn't last and you didn't have money to get more. Never true        Stress    Do you feel stress - tense, restless, nervous, or anxious, or unable to sleep at night because your mind is troubled all the time - these days? Not at all        Social Isolation    How often do you feel lonely or isolated from those around you?  Never        Alcohol Use    Q1: How often do you have a drink containing alcohol? Never     Q2: How many drinks containing alcohol do you have on a typical day when you are drinking? Patient does not drink     Q3: How often do you have six or more drinks on one occasion? Never        [email or web address]    In the past 12 months has the electric, gas, oil, or water company threatened to shut off services in your home? No        Health Literacy    How often do you need to have someone help you when you read instructions, pamphlets, or other written material from your doctor or pharmacy? Never                   CM spoke with patient in Room 24944 at bedside. All information was verified on facesheet. Patient lives in a 2 story house with spouse, 3 steps to get inside with no pets. Daughter Kim lives in an apartment in the back of house as support. Patient states no assistance is needed. Patient can ambulate, drive, run errands, and complete ADL's independently. Patient does not use any DME or any in-home assistive equipment. Patient has not used Home Health previously. Patient is not on dialysis nor use Coumadin as a blood thinner. Patient takes medication as prescribed and has resources for all prescriptive needs. Patient will have help from family member  upon discharge. Family will provide transportation home. All Questions and concerns addressed and whiteboard updated with CM contact information. Will continue to follow for course of hospitalization.       Discharge Plan A and Plan B have been determined by review of patient's clinical status, future medical and therapeutic needs, and coverage/benefits for post-acute care in coordination with multidisciplinary team members.     Claribel Garcia RN, BSN  Case Management  [phone]

## 2025-05-29 NOTE — ASSESSMENT & PLAN NOTE
Transcatheter Aortic valve replacement   Valve: 29 mm Kathia S3 valve   ECMO:  On Call  TAVR access: RTF, transaxillary as backup  Valvuloplasty balloon: Yes  Antithrombotic therapy: None  Temporary pacing wire: Yes  Pacemaker risk factors: IVCD   Post op destination: ICU  Antibiotics given: (to be done during procedure)  Heart failure on admission?  CONSENTING:  The patient was told that the procedure carries around a 12.5% risk of permanent pacemaker requirement and that risk depends on the patients underlying conduction system.  Prior to the [facility] visit, all available records from referring provider were reviewed.  I have personally reviewed all the lab tests available related to the patient's case  The patient's images were reviewed by myself and the procedural planning was done with my own interpretation of the iliac and aortic anatomy based on CTA, angiography or JEWEL. I have reviewed all other imaging studies relevant to the patient including echocardiography and coronary angiography.  Patient was educated abut the pathophysiology and natural history of severe aortic stenosis and was educated about the treatment options including surgical and transcatheter valve replacement. She agrees to be full code for the forseable future and to undergo workup for treatment of severe AS.   The risks, benefits, and alternatives of transcatheter aortic valve replacement were discussed with the patient. All questions were answered and informed consent was obtained. I had a detailed discussion with the patient regarding risk of stroke, MI, bleeding access site complications including limb loss, allergy, kidney failure including dialysis and death.  The patient understands the risks and benefits and wishes to go ahead with the procedure.  The referring Cardiologist was notified of the plan  All patient's questions were answered    Shared Decision Making:  This patient was seen today by a multidisciplinary heart team involving  both a Structural Heart Specialist (Interventional Cardiologist) and a Cardiothoracic Surgeon. The patient was involved in shared decision-making to define clearer goals for treatment and align health decisions with their current values.  The patient understands the risks and expected benefits of their treatment options.

## 2025-05-29 NOTE — HOSPITAL COURSE
Patient admitted to the CCU d/o anaphylaxis treated with epinephrine and levophed. The patient underwent TAVR on 05/29/2025. Following the successful procedure, he was intubated and placed on mechanical ventilation due to aspiration. A CT head was obtained to evaluate for possible stroke in the setting of post-procedural dysphagia, imaging showed no acute intracranial pathology. He was subsequently reassessed by Speech Therapy and cleared for a regular diet with thin liquids. On 06/02/2025, the patient developed sudden-onset acute hypoxic respiratory failure, which improved with high-flow oxygen therapy. 6MWT was performed and indicated a need for supplemental oxygen with exertion.    Mr. Olivier was discharged today on Clopidogrel, his chronic medications, and home oxygen. Follow up with primary Cardiologist.

## 2025-05-29 NOTE — NURSING
Pt to SICU [id number] post TAVR w cathlab RN and Anesthesia; MD Carlos cardiology notified of pt arrival; RT at bedside and connected pt to vent; Pt connected to bedside monitor; Pt VSS; Pt agitated called MD Carlos and received orders for precedex - orders carried out; Will continue to monitor.

## 2025-05-29 NOTE — H&P
[facility]  Interventional Cardiology  H&P    Patient Name: Dutch Olivier  MRN: [id number]  Admission Date: 5/28/2025  Code Status: No Order   Attending Provider: Leandro Madrid MD   Primary Care Physician: David Arreola MD  Principal Problem:Anaphylaxis    Patient information was obtained from patient, past medical records, and ER records.     Subjective:     Chief Complaint:  Dyspnea     HPI:  Dutch Olivier is a 78 yo M with a history of nonobstructive CAD, COPD, HLD, HTN, prostate ca w bone mets, and severe AS who presented for outpatient TAVR that was complicated by anaphylaxis pre procedure.     Patient presented yesterday for outpatient TAVR. Prior to the procedure, he had a peripheral IV inserted with normal saline injected. He then became acutely dyspneic, tachycardic, and diaphoretic. Diffuse rash ntoed across torso. He became unresponsive, though did not lose a pulse. He was placed on NRB. Given Cesar pushes x3, Epi gtt started. Given solumedrol, benadryl, and albuterol nebs. Concern for possible HF contributing and given Lasix. Levo was also added on and he was tansferred to the ICU under the CCU team.      On inital evaluation in the ICU, patient on Epi and Levo with MAPs >65. Sating well on 6L NC. CVL and ART line placed bedside. He reports feeling well at this time, denying chest pain, dyspnea, pre-syncope. Gtts since weaned with Levo discontinued and Epi 0.1. Sating well on 3L NC.     This AM, patient reports feeling well, though fatigued. Denies denies pre-syncope, syncope, chest pain, palpitations, SOB, DANISH, orthopnea. He has been off Epi since 0400 and is sating well on RA.     The patient has undergone the following TAVR work-up:   ECHO (Date 4.22.25): KAMRAN= 0.9 cm2, MG= 44mmHg, Peak Maxime= 4.2 m/s, EF= 60%.   LH (5/16/25): nonobstructive CAD  STS: 5  Frailty: 1/4   Iliacs are >4 on R and > 5 on L (Can be dilated with a 7 mm balloon  LVOT area by CTA is 6.01  cm2 (32 mm X 24 mm) and Avg Diameter is 27.7 per my independent review of images on [employer].  Incidental findings on CT: Multifocal severe stenosis or occlusion of the bilateral superficial femoral arteries.  4 mm left upper lobe solid nodule.  CT Surgery risk assessment: High risk SAVR  Rhythm issues: None  PFTs: Not done.  KCCQ/5 meter walk: Done in clinic  Comorbidities: PAD, DLD, HTN     Dutch Olivier is a 29 mm  Kathia S3 valve candidate via RTF access with balloon dilation of the REIA or shockwave balloon.     Past Medical History:   Diagnosis Date    Alcohol abuse, daily use     8/2024 stopped    Amblyopia     OS    Aortic valve stenosis     Cancer     melanoma, prostate    Cataract     Complication of anesthesia     says he sometimes is slow to awaken    COPD (chronic obstructive pulmonary disease)     no oxygen, no inhalers, or nebulizers    Diverticulosis     Gross hematuria     History of colonic polyps     History of malignant melanoma 01/01/2011    right ear, had chemo//Dr Galvansett    HTN (hypertension)     Hyperlipemia     Kidney stone        Past Surgical History:   Procedure Laterality Date    CORONARY ANGIOGRAPHY N/A 5/16/2025    Procedure: ANGIOGRAM, CORONARY ARTERY;  Surgeon: Gume Blood MD;  Location: [facility] CATH LAB;  Service: Cardiology;  Laterality: N/A;    cystolithopaxy      CYSTOSCOPIC LITHOLAPAXY N/A 3/25/2025    Procedure: CYSTOLITHOLAPAXY - with Thulium Laser - Bladder Stone;  Surgeon: EZ Kwan MD;  Location: [facility];  Service: Urology;  Laterality: N/A;    EXTERNAL EAR SURGERY  01/01/2011    melanoma removed right ear    HERNIA REPAIR      [facility]    PORTACATH PLACEMENT  01/01/2011    later removed    PROSTATE SURGERY  01/01/2003    prostatectomy       Review of patient's allergies indicates:   Allergen Reactions    Aspirin Hives       Facility-Administered Medications Prior to Admission   Medication    sodium chloride 0.9% flush 10 mL    sodium chloride 0.9% flush 10  mL     PTA Medications   Medication Sig    b complex vitamins tablet Take 1 tablet by mouth once daily.    benazepriL (LOTENSIN) 20 MG tablet Take 1 tablet (20 mg total) by mouth 2 (two) times daily.    bicalutamide (CASODEX) 50 MG Tab TAKE 1 TABLET (50 MG TOTAL) BY MOUTH ONCE DAILY.    hydroCHLOROthiazide (HYDRODIURIL) 12.5 MG Tab Take 1 tablet (12.5 mg total) by mouth every morning.    multivitamin capsule Take 1 capsule by mouth once daily.    pravastatin (PRAVACHOL) 40 MG tablet Take 1 tablet (40 mg total) by mouth once daily.     Family History       Problem Relation (Age of Onset)    Alcohol abuse Father, Brother    Blindness Father    Drug abuse Brother    Heart attack Mother    Heart defect Maternal Uncle          Tobacco Use    Smoking status: Every Day     Current packs/day: 1.00     Types: Cigarettes     Passive exposure: Current    Smokeless tobacco: Never   Vaping Use    Vaping status: Never Used   Substance and Sexual Activity    Alcohol use: Not Currently     Comment: 6-7 nightly for yrs/ stopped 2024    Drug use: No    Sexual activity: Yes     Partners: Female     Review of Systems   Constitutional: Positive for malaise/fatigue. Negative for chills and fever.   Cardiovascular:  Negative for chest pain, irregular heartbeat, near-syncope, orthopnea, palpitations and syncope.   Respiratory:  Negative for shortness of breath.    Gastrointestinal:  Negative for abdominal pain, nausea and vomiting.   Neurological:  Negative for light-headedness.     Objective:     Vital Signs (Most Recent):  Temp: 98.6 °F (37 °C) (05/29/25 0700)  Pulse: 64 (05/29/25 0945)  Resp: (!) 21 (05/29/25 0945)  BP: (!) 125/58 (05/29/25 0945)  SpO2: 95 % (05/29/25 0945) Vital Signs (24h Range):  Temp:  [97.5 °F (36.4 °C)-98.9 °F (37.2 °C)] 98.6 °F (37 °C)  Pulse:  [] 64  Resp:  [15-61] 21  SpO2:  [87 %-100 %] 95 %  BP: ()/(0-74) 125/58  Arterial Line BP: ()/() 89/76     Weight: 83.9 kg (185 lb)  Body mass  index is 23.75 kg/m².    SpO2: 95 %         Intake/Output Summary (Last 24 hours) at 5/29/2025 1029  Last data filed at 5/29/2025 0715  Gross per 24 hour   Intake 498.59 ml   Output 790 ml   Net -291.41 ml       Lines/Drains/Airways       Central Venous Catheter Line  Duration             Percutaneous Central Line - Triple Lumen  05/28/25 1305 <1 day              Drain  Duration             Male External Urinary Catheter 05/28/25 1400 Medium <1 day              Peripheral Intravenous Line  Duration                  Peripheral IV - Single Lumen 05/28/25 1140 20 G Right Antecubital <1 day         Peripheral IV - Single Lumen 05/28/25 1230 20 G No Anterior;Left Forearm <1 day                     Physical Exam  Constitutional:       Appearance: Normal appearance.   HENT:      Mouth/Throat:      Mouth: Mucous membranes are moist.   Eyes:      Extraocular Movements: Extraocular movements intact.      Conjunctiva/sclera: Conjunctivae normal.   Cardiovascular:      Rate and Rhythm: Normal rate and regular rhythm.   Pulmonary:      Effort: Pulmonary effort is normal.   Abdominal:      General: Abdomen is flat.      Palpations: Abdomen is soft.   Skin:     General: Skin is warm and dry.   Neurological:      General: No focal deficit present.      Mental Status: He is alert and oriented to person, place, and time.            Significant Labs: All pertinent lab results from the last 24 hours have been reviewed.    Significant Imaging: Reviewed  Assessment and Plan:     Cardiac/Vascular  Aortic stenosis  Transcatheter Aortic valve replacement   Valve: 29 mm Kathia S3 valve   ECMO:  On Call  TAVR access: RTF, transaxillary as backup  Valvuloplasty balloon: Yes  Antithrombotic therapy: None  Temporary pacing wire: Yes  Pacemaker risk factors: IVCD   Post op destination: ICU  Antibiotics given: (to be done during procedure)  Heart failure on admission?  CONSENTING:  The patient was told that the procedure carries around a 12.5%  risk of permanent pacemaker requirement and that risk depends on the patients underlying conduction system.  Prior to the [facility] visit, all available records from referring provider were reviewed.  I have personally reviewed all the lab tests available related to the patient's case  The patient's images were reviewed by myself and the procedural planning was done with my own interpretation of the iliac and aortic anatomy based on CTA, angiography or JEWEL. I have reviewed all other imaging studies relevant to the patient including echocardiography and coronary angiography.  Patient was educated abut the pathophysiology and natural history of severe aortic stenosis and was educated about the treatment options including surgical and transcatheter valve replacement. She agrees to be full code for the forseable future and to undergo workup for treatment of severe AS.   The risks, benefits, and alternatives of transcatheter aortic valve replacement were discussed with the patient. All questions were answered and informed consent was obtained. I had a detailed discussion with the patient regarding risk of stroke, MI, bleeding access site complications including limb loss, allergy, kidney failure including dialysis and death.  The patient understands the risks and benefits and wishes to go ahead with the procedure.  The referring Cardiologist was notified of the plan  All patient's questions were answered    Shared Decision Making:  This patient was seen today by a multidisciplinary heart team involving both a Structural Heart Specialist (Interventional Cardiologist) and a Cardiothoracic Surgeon. The patient was involved in shared decision-making to define clearer goals for treatment and align health decisions with their current values.  The patient understands the risks and expected benefits of their treatment options.        VTE Risk Mitigation (From admission, onward)           Ordered     IP VTE HIGH RISK PATIENT  Once          05/28/25 2009     Place sequential compression device  Until discontinued         05/28/25 1100                      Yajaira Gayle MD  Interventional Cardiology   [facility]

## 2025-05-29 NOTE — PROGRESS NOTES
[facility]  Cardiology  Progress Note    Patient Name: Dutch Olivier  MRN: [id number]  Admission Date: 5/28/2025  Hospital Length of Stay: 1 days  Code Status: No Order   Attending Physician: Leandro Madrid MD   Primary Care Physician: David Arreola MD  Expected Discharge Date: 6/4/2025  Principal Problem:Anaphylaxis    Subjective:     Hospital Course:   Patient admitted for anaphylaxis. Started on epinephrine and levophed. Levophed and epinephrine discontinued. Scheduled for TAVR on 5/29.    Interval History: NAEON. Afebrile. AAO x3. Patient resting well in bed. Daughter at bedside. Denies any HA, chest pain or pressure, SOB, hemoptysis, abdominal pain, or weakness.      Review of Systems   Constitutional: Negative for diaphoresis and fever.   HENT:  Negative for congestion and sore throat.    Eyes: Negative.    Cardiovascular:  Negative for chest pain, irregular heartbeat and palpitations.   Respiratory:  Negative for cough and shortness of breath.    Endocrine: Negative.    Hematologic/Lymphatic: Negative.    Skin:  Negative for itching and rash.   Musculoskeletal:  Negative for joint pain and myalgias.   Gastrointestinal:  Negative for abdominal pain, heartburn, nausea and vomiting.   Genitourinary:  Negative for dysuria and hematuria.   Neurological:  Negative for dizziness, headaches and light-headedness.   Psychiatric/Behavioral: Negative.       Objective:     Vital Signs (Most Recent):  Temp: 98.4 °F (36.9 °C) (05/29/25 1100)  Pulse: 88 (05/29/25 1115)  Resp: (!) 36 (05/29/25 1115)  BP: (!) 121/58 (05/29/25 1115)  SpO2: (!) 93 % (05/29/25 1115) Vital Signs (24h Range):  Temp:  [97.8 °F (36.6 °C)-98.9 °F (37.2 °C)] 98.4 °F (36.9 °C)  Pulse:  [] 88  Resp:  [15-61] 36  SpO2:  [92 %-99 %] 93 %  BP: ()/(0-74) 121/58  Arterial Line BP: ()/() 89/76     Weight: 83.9 kg (185 lb)  Body mass index is 23.75 kg/m².     SpO2: (!) 93 %         Intake/Output  Summary (Last 24 hours) at 5/29/2025 1130  Last data filed at 5/29/2025 1100  Gross per 24 hour   Intake 498.59 ml   Output 990 ml   Net -491.41 ml       Lines/Drains/Airways       Central Venous Catheter Line  Duration             Percutaneous Central Line - Triple Lumen  05/28/25 1305 <1 day              Drain  Duration             Male External Urinary Catheter 05/28/25 1400 Medium <1 day              Peripheral Intravenous Line  Duration                  Peripheral IV - Single Lumen 05/28/25 1140 20 G Right Antecubital <1 day         Peripheral IV - Single Lumen 05/28/25 1230 20 G No Anterior;Left Forearm <1 day                       Physical Exam  Constitutional:       General: He is not in acute distress.     Appearance: He is not ill-appearing.   Cardiovascular:      Rate and Rhythm: Normal rate and regular rhythm.      Pulses: Normal pulses.      Heart sounds: Normal heart sounds.   Pulmonary:      Effort: Pulmonary effort is normal.      Breath sounds: Normal breath sounds.   Abdominal:      General: Bowel sounds are normal. There is no distension.      Tenderness: There is no abdominal tenderness.   Musculoskeletal:         General: No swelling. Normal range of motion.   Skin:     General: Skin is warm.   Neurological:      General: No focal deficit present.      Mental Status: He is alert and oriented to person, place, and time.   Psychiatric:         Mood and Affect: Mood normal.            Significant Labs: CMP   Recent Labs   Lab 05/28/25  1133 05/29/25  0403    142   K 3.8 3.5    106   CO2 26 22*    131*   BUN 19 29*   CREATININE 0.9 1.1   CALCIUM 9.5 8.8   PROT  --  6.6   ALBUMIN  --  3.7   BILITOT  --  0.3   ALKPHOS  --  73   AST  --  17   ALT  --  11   ANIONGAP 10 14    and CBC   Recent Labs   Lab 05/28/25  1133 05/29/25  0403   WBC 8.35 16.64*   HGB 12.5* 11.5*   HCT 37.9* 34.2*    277       Significant Imaging: reviewed  Assessment and Plan:     Assessment &  Plan  Anaphylaxis  Prior to TAVR procedure, peripheral IV inserted with normal saline injected. Patient then became hypotensive, dyspneic, and diaphoretic, with rasha cross torso. Placed on NRB and given solumedrol, benadryl, and nebs. S/p omar, then started on epi gtt and levo gtt. Patient admitted to the ICU, since improved with ability to wean o2 and pressors. Unknown source, no medication administered prior to reaction. No h/o anaphylaxis, no significant allergies.   - Infectious workup ordered  - d/c'ed Epi   - Albuterol nebs scheduled  - Continue to monitor in the ICU  Hyperlipemia  - Continue statin  COPD (chronic obstructive pulmonary disease)  - Albuterol nebs scheduled   Essential hypertension  - Hold antihypertensives   Aortic stenosis  H/o severe AS, presented for outpatient TAVR. Deferred due to anaphylactic reaction pre-procedure.   - TAVR on 5/29            VTE Risk Mitigation (From admission, onward)           Ordered     IP VTE HIGH RISK PATIENT  Once         05/28/25 2009     Place sequential compression device  Until discontinued         05/28/25 6596                    Judit Mancuso MD  PGY-1  Cardiology  [facility]

## 2025-05-29 NOTE — ASSESSMENT & PLAN NOTE
Prior to TAVR procedure, peripheral IV inserted with normal saline injected. Patient then became hypotensive, dyspneic, and diaphoretic, with rasha cross torso. Placed on NRB and given solumedrol, benadryl, and nebs. S/p omar, then started on epi gtt and levo gtt. Patient admitted to the ICU, since improved with ability to wean o2 and pressors. Unknown source, no medication administered prior to reaction. No h/o anaphylaxis, no significant allergies.   - Infectious workup ordered  - d/c'ed Epi   - Albuterol nebs scheduled  - Continue to monitor in the ICU

## 2025-05-29 NOTE — NURSING
Pt HR decreased to mid 50s @55 at 2220 after titrating down Epi gtt. MD Dr. Bui notified, instructed to continue to monitor and titrate Epi drip as tolerated.

## 2025-05-29 NOTE — ASSESSMENT & PLAN NOTE
H/o severe AS, presented for outpatient TAVR. Deferred due to anaphylactic reaction pre-procedure.   - TAVR on 5/29

## 2025-05-29 NOTE — SUBJECTIVE & OBJECTIVE
Past Medical History:   Diagnosis Date    Alcohol abuse, daily use     8/2024 stopped    Amblyopia     OS    Aortic valve stenosis     Cancer     melanoma, prostate    Cataract     Complication of anesthesia     says he sometimes is slow to awaken    COPD (chronic obstructive pulmonary disease)     no oxygen, no inhalers, or nebulizers    Diverticulosis     Gross hematuria     History of colonic polyps     History of malignant melanoma 01/01/2011    right ear, had chemo//Dr Riverst    HTN (hypertension)     Hyperlipemia     Kidney stone        Past Surgical History:   Procedure Laterality Date    CORONARY ANGIOGRAPHY N/A 5/16/2025    Procedure: ANGIOGRAM, CORONARY ARTERY;  Surgeon: Gume Blood MD;  Location: [facility] CATH LAB;  Service: Cardiology;  Laterality: N/A;    cystolithopaxy      CYSTOSCOPIC LITHOLAPAXY N/A 3/25/2025    Procedure: CYSTOLITHOLAPAXY - with Thulium Laser - Bladder Stone;  Surgeon: EZ Kwan MD;  Location: [facility] OR;  Service: Urology;  Laterality: N/A;    EXTERNAL EAR SURGERY  01/01/2011    melanoma removed right ear    HERNIA REPAIR      RIH    PORTACATH PLACEMENT  01/01/2011    later removed    PROSTATE SURGERY  01/01/2003    prostatectomy       Review of patient's allergies indicates:   Allergen Reactions    Aspirin Hives       Facility-Administered Medications Prior to Admission   Medication    sodium chloride 0.9% flush 10 mL    sodium chloride 0.9% flush 10 mL     PTA Medications   Medication Sig    b complex vitamins tablet Take 1 tablet by mouth once daily.    benazepriL (LOTENSIN) 20 MG tablet Take 1 tablet (20 mg total) by mouth 2 (two) times daily.    bicalutamide (CASODEX) 50 MG Tab TAKE 1 TABLET (50 MG TOTAL) BY MOUTH ONCE DAILY.    hydroCHLOROthiazide (HYDRODIURIL) 12.5 MG Tab Take 1 tablet (12.5 mg total) by mouth every morning.    multivitamin capsule Take 1 capsule by mouth once daily.    pravastatin (PRAVACHOL) 40 MG tablet Take 1 tablet (40 mg total) by mouth  once daily.     Family History       Problem Relation (Age of Onset)    Alcohol abuse Father, Brother    Blindness Father    Drug abuse Brother    Heart attack Mother    Heart defect Maternal Uncle          Tobacco Use    Smoking status: Every Day     Current packs/day: 1.00     Types: Cigarettes     Passive exposure: Current    Smokeless tobacco: Never   Vaping Use    Vaping status: Never Used   Substance and Sexual Activity    Alcohol use: Not Currently     Comment: 6-7 nightly for yrs/ stopped 2024    Drug use: No    Sexual activity: Yes     Partners: Female     Review of Systems   Constitutional: Positive for malaise/fatigue. Negative for chills and fever.   Cardiovascular:  Negative for chest pain, irregular heartbeat, near-syncope, orthopnea, palpitations and syncope.   Respiratory:  Negative for shortness of breath.    Gastrointestinal:  Negative for abdominal pain, nausea and vomiting.   Neurological:  Negative for light-headedness.     Objective:     Vital Signs (Most Recent):  Temp: 98.6 °F (37 °C) (05/29/25 0700)  Pulse: 64 (05/29/25 0945)  Resp: (!) 21 (05/29/25 0945)  BP: (!) 125/58 (05/29/25 0945)  SpO2: 95 % (05/29/25 0945) Vital Signs (24h Range):  Temp:  [97.5 °F (36.4 °C)-98.9 °F (37.2 °C)] 98.6 °F (37 °C)  Pulse:  [] 64  Resp:  [15-61] 21  SpO2:  [87 %-100 %] 95 %  BP: ()/(0-74) 125/58  Arterial Line BP: ()/() 89/76     Weight: 83.9 kg (185 lb)  Body mass index is 23.75 kg/m².    SpO2: 95 %         Intake/Output Summary (Last 24 hours) at 5/29/2025 1029  Last data filed at 5/29/2025 0715  Gross per 24 hour   Intake 498.59 ml   Output 790 ml   Net -291.41 ml       Lines/Drains/Airways       Central Venous Catheter Line  Duration             Percutaneous Central Line - Triple Lumen  05/28/25 1305 <1 day              Drain  Duration             Male External Urinary Catheter 05/28/25 1400 Medium <1 day              Peripheral Intravenous Line  Duration                   Peripheral IV - Single Lumen 05/28/25 1140 20 G Right Antecubital <1 day         Peripheral IV - Single Lumen 05/28/25 1230 20 G No Anterior;Left Forearm <1 day                     Physical Exam  Constitutional:       Appearance: Normal appearance.   HENT:      Mouth/Throat:      Mouth: Mucous membranes are moist.   Eyes:      Extraocular Movements: Extraocular movements intact.      Conjunctiva/sclera: Conjunctivae normal.   Cardiovascular:      Rate and Rhythm: Normal rate and regular rhythm.   Pulmonary:      Effort: Pulmonary effort is normal.   Abdominal:      General: Abdomen is flat.      Palpations: Abdomen is soft.   Skin:     General: Skin is warm and dry.   Neurological:      General: No focal deficit present.      Mental Status: He is alert and oriented to person, place, and time.            Significant Labs: All pertinent lab results from the last 24 hours have been reviewed.    Significant Imaging: Reviewed

## 2025-05-29 NOTE — EICU
VICU Night Rounds Checklist  24H Vital Sign Range:  Temp:  [97.5 °F (36.4 °C)-98.9 °F (37.2 °C)]   Pulse:  []   Resp:  [15-61]   BP: ()/(0-68)   SpO2:  [87 %-100 %]   Arterial Line BP: ()/()     Video rounds and LDA reconciliation

## 2025-05-29 NOTE — CARE UPDATE
CCU Care Update Note    8PM     Patient is feeling well, no respiratory distress and normal BP. No signs of allergic reaction and he received steroids in the am. He has been off of Levophed since 5PM.     Continuous Infusions:      EPINEPHrine  0-2 mcg/kg/min Intravenous Continuous 20.1 mL/hr at 05/28/25 2100 0.08 mcg/kg/min at 05/28/25 2100       Mechanical support: None    Plan:  - Wean off epi with goal MAP>65  - If patient develops shortness of breath or signs of allergic reaction, might need to intensify allergy medications  - Will restart home medication for prostate cancer - Bicalutamide  - Plan discussed with attending     Hao Pride MD  Cardiovascular Disease PGY-V  [facility]

## 2025-05-29 NOTE — NURSING
Pt report given to receiving RN; Pt plan of care discussed; Pt VSS; Pt intubated and sedated per orders; Pt bed locked + lowered, Srx3, + call bell within reach; Pt daughters at bedside and updated

## 2025-05-29 NOTE — SUBJECTIVE & OBJECTIVE
Interval History: NAEON. Afebrile. AAO x3. Patient resting well in bed. Daughter at bedside. Denies any HA, chest pain or pressure, SOB, hemoptysis, abdominal pain, or weakness.      Review of Systems   Constitutional: Negative for diaphoresis and fever.   HENT:  Negative for congestion and sore throat.    Eyes: Negative.    Cardiovascular:  Negative for chest pain, irregular heartbeat and palpitations.   Respiratory:  Negative for cough and shortness of breath.    Endocrine: Negative.    Hematologic/Lymphatic: Negative.    Skin:  Negative for itching and rash.   Musculoskeletal:  Negative for joint pain and myalgias.   Gastrointestinal:  Negative for abdominal pain, heartburn, nausea and vomiting.   Genitourinary:  Negative for dysuria and hematuria.   Neurological:  Negative for dizziness, headaches and light-headedness.   Psychiatric/Behavioral: Negative.       Objective:     Vital Signs (Most Recent):  Temp: 98.4 °F (36.9 °C) (05/29/25 1100)  Pulse: 88 (05/29/25 1115)  Resp: (!) 36 (05/29/25 1115)  BP: (!) 121/58 (05/29/25 1115)  SpO2: (!) 93 % (05/29/25 1115) Vital Signs (24h Range):  Temp:  [97.8 °F (36.6 °C)-98.9 °F (37.2 °C)] 98.4 °F (36.9 °C)  Pulse:  [] 88  Resp:  [15-61] 36  SpO2:  [92 %-99 %] 93 %  BP: ()/(0-74) 121/58  Arterial Line BP: ()/() 89/76     Weight: 83.9 kg (185 lb)  Body mass index is 23.75 kg/m².     SpO2: (!) 93 %         Intake/Output Summary (Last 24 hours) at 5/29/2025 1130  Last data filed at 5/29/2025 1100  Gross per 24 hour   Intake 498.59 ml   Output 990 ml   Net -491.41 ml       Lines/Drains/Airways       Central Venous Catheter Line  Duration             Percutaneous Central Line - Triple Lumen  05/28/25 1305 <1 day              Drain  Duration             Male External Urinary Catheter 05/28/25 1400 Medium <1 day              Peripheral Intravenous Line  Duration                  Peripheral IV - Single Lumen 05/28/25 1140 20 G Right Antecubital <1 day          Peripheral IV - Single Lumen 05/28/25 1230 20 G No Anterior;Left Forearm <1 day                       Physical Exam  Constitutional:       General: He is not in acute distress.     Appearance: He is not ill-appearing.   Cardiovascular:      Rate and Rhythm: Normal rate and regular rhythm.      Pulses: Normal pulses.      Heart sounds: Normal heart sounds.   Pulmonary:      Effort: Pulmonary effort is normal.      Breath sounds: Normal breath sounds.   Abdominal:      General: Bowel sounds are normal. There is no distension.      Tenderness: There is no abdominal tenderness.   Musculoskeletal:         General: No swelling. Normal range of motion.   Skin:     General: Skin is warm.   Neurological:      General: No focal deficit present.      Mental Status: He is alert and oriented to person, place, and time.   Psychiatric:         Mood and Affect: Mood normal.            Significant Labs: CMP   Recent Labs   Lab 05/28/25  1133 05/29/25  0403    142   K 3.8 3.5    106   CO2 26 22*    131*   BUN 19 29*   CREATININE 0.9 1.1   CALCIUM 9.5 8.8   PROT  --  6.6   ALBUMIN  --  3.7   BILITOT  --  0.3   ALKPHOS  --  73   AST  --  17   ALT  --  11   ANIONGAP 10 14    and CBC   Recent Labs   Lab 05/28/25  1133 05/29/25  0403   WBC 8.35 16.64*   HGB 12.5* 11.5*   HCT 37.9* 34.2*    277       Significant Imaging: reviewed

## 2025-05-30 PROBLEM — T17.918A ASPIRATION OF STOMACH CONTENTS: Status: ACTIVE | Noted: 2025-05-30

## 2025-05-30 LAB
ABSOLUTE EOSINOPHIL (OHS): 0.01 K/UL
ABSOLUTE MONOCYTE (OHS): 0.98 K/UL (ref 0.3–1)
ABSOLUTE NEUTROPHIL COUNT (OHS): 12.85 K/UL (ref 1.8–7.7)
ALBUMIN SERPL BCP-MCNC: 3.4 G/DL (ref 3.5–5.2)
ALLENS TEST: ABNORMAL
ALLENS TEST: ABNORMAL
ALP SERPL-CCNC: 67 UNIT/L (ref 40–150)
ALT SERPL W/O P-5'-P-CCNC: 11 UNIT/L (ref 10–44)
ANION GAP (OHS): 10 MMOL/L (ref 8–16)
AST SERPL-CCNC: 14 UNIT/L (ref 11–45)
BASOPHILS # BLD AUTO: 0.02 K/UL
BASOPHILS NFR BLD AUTO: 0.1 %
BILIRUB SERPL-MCNC: 0.3 MG/DL (ref 0.1–1)
BUN SERPL-MCNC: 25 MG/DL (ref 8–23)
CALCIUM SERPL-MCNC: 8.2 MG/DL (ref 8.7–10.5)
CHLORIDE SERPL-SCNC: 112 MMOL/L (ref 95–110)
CO2 SERPL-SCNC: 22 MMOL/L (ref 23–29)
CREAT SERPL-MCNC: 0.9 MG/DL (ref 0.5–1.4)
DELSYS: ABNORMAL
DELSYS: ABNORMAL
ERYTHROCYTE [DISTWIDTH] IN BLOOD BY AUTOMATED COUNT: 13.5 % (ref 11.5–14.5)
ERYTHROCYTE [SEDIMENTATION RATE] IN BLOOD BY WESTERGREN METHOD: 20 MM/H
FIO2: 40
FIO2: 40
GFR SERPLBLD CREATININE-BSD FMLA CKD-EPI: >60 ML/MIN/1.73/M2
GLUCOSE SERPL-MCNC: 132 MG/DL (ref 70–110)
GLUCOSE SERPL-MCNC: 136 MG/DL (ref 70–110)
HCO3 UR-SCNC: 23.6 MMOL/L (ref 24–28)
HCO3 UR-SCNC: 24.8 MMOL/L (ref 24–28)
HCO3 UR-SCNC: 25.2 MMOL/L (ref 24–28)
HCT VFR BLD AUTO: 33.2 % (ref 40–54)
HCT VFR BLD CALC: 31 %PCV (ref 36–54)
HCT VFR BLD CALC: 31 %PCV (ref 36–54)
HGB BLD-MCNC: 11 GM/DL (ref 14–18)
IMM GRANULOCYTES # BLD AUTO: 0.09 K/UL (ref 0–0.04)
IMM GRANULOCYTES NFR BLD AUTO: 0.6 % (ref 0–0.5)
LYMPHOCYTES # BLD AUTO: 1.54 K/UL (ref 1–4.8)
MAGNESIUM SERPL-MCNC: 2.2 MG/DL (ref 1.6–2.6)
MCH RBC QN AUTO: 29.6 PG (ref 27–31)
MCHC RBC AUTO-ENTMCNC: 33.1 G/DL (ref 32–36)
MCV RBC AUTO: 90 FL (ref 82–98)
MIN VOL: 11
MODE: ABNORMAL
MODE: ABNORMAL
NUCLEATED RBC (/100WBC) (OHS): 0 /100 WBC
OHS QRS DURATION: 100 MS
OHS QRS DURATION: 102 MS
OHS QRS DURATION: 108 MS
OHS QRS DURATION: 90 MS
OHS QTC CALCULATION: 410 MS
OHS QTC CALCULATION: 460 MS
OHS QTC CALCULATION: 468 MS
OHS QTC CALCULATION: 468 MS
PCO2 BLDA: 33.1 MMHG (ref 35–45)
PCO2 BLDA: 37.8 MMHG (ref 35–45)
PCO2 BLDA: 59.7 MMHG (ref 35–45)
PEEP: 5
PEEP: 5
PH SMN: 7.23 [PH] (ref 7.35–7.45)
PH SMN: 7.43 [PH] (ref 7.35–7.45)
PH SMN: 7.46 [PH] (ref 7.35–7.45)
PIP: 17
PLATELET # BLD AUTO: 229 K/UL (ref 150–450)
PMV BLD AUTO: 9.6 FL (ref 9.2–12.9)
PO2 BLDA: 120 MMHG (ref 80–100)
PO2 BLDA: 91 MMHG (ref 80–100)
PO2 BLDA: 91 MMHG (ref 80–100)
POC ACTIVATED CLOTTING TIME K: 273 SEC (ref 74–137)
POC BE: -2 MMOL/L (ref -2–2)
POC BE: 0 MMOL/L (ref -2–2)
POC BE: 0 MMOL/L (ref -2–2)
POC IONIZED CALCIUM: 1.16 MMOL/L (ref 1.06–1.42)
POC IONIZED CALCIUM: 1.17 MMOL/L (ref 1.06–1.42)
POC SATURATED O2: 97 % (ref 95–100)
POC SATURATED O2: 98 % (ref 95–100)
POC SATURATED O2: 98 % (ref 95–100)
POC TCO2: 25 MMOL/L (ref 23–27)
POC TCO2: 26 MMOL/L (ref 23–27)
POC TCO2: 27 MMOL/L (ref 23–27)
POCT GLUCOSE: 101 MG/DL (ref 70–110)
POCT GLUCOSE: 117 MG/DL (ref 70–110)
POTASSIUM BLD-SCNC: 3.7 MMOL/L (ref 3.5–5.1)
POTASSIUM BLD-SCNC: 4.1 MMOL/L (ref 3.5–5.1)
POTASSIUM SERPL-SCNC: 4 MMOL/L (ref 3.5–5.1)
PROT SERPL-MCNC: 6.1 GM/DL (ref 6–8.4)
PS: 5
RBC # BLD AUTO: 3.71 M/UL (ref 4.6–6.2)
RELATIVE EOSINOPHIL (OHS): 0.1 %
RELATIVE LYMPHOCYTE (OHS): 9.9 % (ref 18–48)
RELATIVE MONOCYTE (OHS): 6.3 % (ref 4–15)
RELATIVE NEUTROPHIL (OHS): 83 % (ref 38–73)
SAMPLE: ABNORMAL
SITE: ABNORMAL
SITE: ABNORMAL
SODIUM BLD-SCNC: 139 MMOL/L (ref 136–145)
SODIUM BLD-SCNC: 144 MMOL/L (ref 136–145)
SODIUM SERPL-SCNC: 144 MMOL/L (ref 136–145)
SP02: 97
SP02: 98
SPONT RATE: 16
VT: 500
WBC # BLD AUTO: 15.49 K/UL (ref 3.9–12.7)

## 2025-05-30 PROCEDURE — 82803 BLOOD GASES ANY COMBINATION: CPT

## 2025-05-30 PROCEDURE — 85014 HEMATOCRIT: CPT

## 2025-05-30 PROCEDURE — 85025 COMPLETE CBC W/AUTO DIFF WBC: CPT

## 2025-05-30 PROCEDURE — 63600175 PHARM REV CODE 636 W HCPCS: Performed by: STUDENT IN AN ORGANIZED HEALTH CARE EDUCATION/TRAINING PROGRAM

## 2025-05-30 PROCEDURE — 83735 ASSAY OF MAGNESIUM: CPT

## 2025-05-30 PROCEDURE — 63600175 PHARM REV CODE 636 W HCPCS

## 2025-05-30 PROCEDURE — 84295 ASSAY OF SERUM SODIUM: CPT

## 2025-05-30 PROCEDURE — 37799 UNLISTED PX VASCULAR SURGERY: CPT

## 2025-05-30 PROCEDURE — 99900017 HC EXTUBATION W/PARAMETERS (STAT)

## 2025-05-30 PROCEDURE — 84132 ASSAY OF SERUM POTASSIUM: CPT

## 2025-05-30 PROCEDURE — 94640 AIRWAY INHALATION TREATMENT: CPT

## 2025-05-30 PROCEDURE — 94003 VENT MGMT INPAT SUBQ DAY: CPT

## 2025-05-30 PROCEDURE — 94150 VITAL CAPACITY TEST: CPT

## 2025-05-30 PROCEDURE — 99900035 HC TECH TIME PER 15 MIN (STAT)

## 2025-05-30 PROCEDURE — 20000000 HC ICU ROOM

## 2025-05-30 PROCEDURE — 27100171 HC OXYGEN HIGH FLOW UP TO 24 HOURS

## 2025-05-30 PROCEDURE — 25000003 PHARM REV CODE 250: Performed by: STUDENT IN AN ORGANIZED HEALTH CARE EDUCATION/TRAINING PROGRAM

## 2025-05-30 PROCEDURE — 25000003 PHARM REV CODE 250

## 2025-05-30 PROCEDURE — 99900026 HC AIRWAY MAINTENANCE (STAT)

## 2025-05-30 PROCEDURE — 94010 BREATHING CAPACITY TEST: CPT

## 2025-05-30 PROCEDURE — 82330 ASSAY OF CALCIUM: CPT

## 2025-05-30 PROCEDURE — 99233 SBSQ HOSP IP/OBS HIGH 50: CPT | Mod: ,,, | Performed by: INTERNAL MEDICINE

## 2025-05-30 PROCEDURE — 25000242 PHARM REV CODE 250 ALT 637 W/ HCPCS

## 2025-05-30 PROCEDURE — 94761 N-INVAS EAR/PLS OXIMETRY MLT: CPT | Mod: XB

## 2025-05-30 RX ORDER — FENTANYL CITRATE 50 UG/ML
50 INJECTION, SOLUTION INTRAMUSCULAR; INTRAVENOUS ONCE
Status: COMPLETED | OUTPATIENT
Start: 2025-05-30 | End: 2025-05-30

## 2025-05-30 RX ORDER — HEPARIN SODIUM 5000 [USP'U]/ML
5000 INJECTION, SOLUTION INTRAVENOUS; SUBCUTANEOUS EVERY 8 HOURS
Status: DISCONTINUED | OUTPATIENT
Start: 2025-05-30 | End: 2025-06-04 | Stop reason: HOSPADM

## 2025-05-30 RX ORDER — PSEUDOEPHEDRINE HYDROCHLORIDE 60 MG/1
60 TABLET ORAL EVERY 6 HOURS PRN
Status: CANCELLED | OUTPATIENT
Start: 2025-05-31

## 2025-05-30 RX ORDER — NOREPINEPHRINE BITARTRATE/D5W 4MG/250ML
0-3 PLASTIC BAG, INJECTION (ML) INTRAVENOUS CONTINUOUS
Status: DISCONTINUED | OUTPATIENT
Start: 2025-05-30 | End: 2025-06-01

## 2025-05-30 RX ORDER — HYDROCODONE BITARTRATE AND ACETAMINOPHEN 7.5; 325 MG/15ML; MG/15ML
15 SOLUTION ORAL EVERY 4 HOURS PRN
Status: CANCELLED | OUTPATIENT
Start: 2025-05-31

## 2025-05-30 RX ORDER — HYDROMORPHONE HYDROCHLORIDE 2 MG/ML
0.2 INJECTION, SOLUTION INTRAMUSCULAR; INTRAVENOUS; SUBCUTANEOUS ONCE
Status: COMPLETED | OUTPATIENT
Start: 2025-05-31 | End: 2025-05-31

## 2025-05-30 RX ORDER — SODIUM CHLORIDE, SODIUM LACTATE, POTASSIUM CHLORIDE, CALCIUM CHLORIDE 600; 310; 30; 20 MG/100ML; MG/100ML; MG/100ML; MG/100ML
INJECTION, SOLUTION INTRAVENOUS CONTINUOUS
Status: DISCONTINUED | OUTPATIENT
Start: 2025-05-30 | End: 2025-05-31

## 2025-05-30 RX ORDER — AMOXICILLIN AND CLAVULANATE POTASSIUM 875; 125 MG/1; MG/1
1 TABLET, FILM COATED ORAL EVERY 12 HOURS
Status: DISCONTINUED | OUTPATIENT
Start: 2025-05-30 | End: 2025-05-30

## 2025-05-30 RX ADMIN — NOREPINEPHRINE BITARTRATE 0.06 MCG/KG/MIN: 4 INJECTION, SOLUTION INTRAVENOUS at 10:05

## 2025-05-30 RX ADMIN — DEXMEDETOMIDINE HYDROCHLORIDE 0.6 MCG/KG/HR: 4 INJECTION INTRAVENOUS at 10:05

## 2025-05-30 RX ADMIN — BICALUTAMIDE 50 MG: 50 TABLET ORAL at 10:05

## 2025-05-30 RX ADMIN — FENTANYL CITRATE 25 MCG: 50 INJECTION INTRAMUSCULAR; INTRAVENOUS at 11:05

## 2025-05-30 RX ADMIN — PROPOFOL 50 MCG/KG/MIN: 10 INJECTION, EMULSION INTRAVENOUS at 12:05

## 2025-05-30 RX ADMIN — PROPOFOL 50 MCG/KG/MIN: 10 INJECTION, EMULSION INTRAVENOUS at 03:05

## 2025-05-30 RX ADMIN — IPRATROPIUM BROMIDE AND ALBUTEROL SULFATE 3 ML: 2.5; .5 SOLUTION RESPIRATORY (INHALATION) at 11:05

## 2025-05-30 RX ADMIN — MUPIROCIN: 20 OINTMENT TOPICAL at 10:05

## 2025-05-30 RX ADMIN — AMPICILLIN SODIUM AND SULBACTAM SODIUM 1.5 G: 1; .5 INJECTION, POWDER, FOR SOLUTION INTRAMUSCULAR; INTRAVENOUS at 11:05

## 2025-05-30 RX ADMIN — HEPARIN SODIUM 5000 UNITS: 5000 INJECTION INTRAVENOUS; SUBCUTANEOUS at 09:05

## 2025-05-30 RX ADMIN — NOREPINEPHRINE BITARTRATE 0.02 MCG/KG/MIN: 4 INJECTION, SOLUTION INTRAVENOUS at 01:05

## 2025-05-30 RX ADMIN — FENTANYL CITRATE 50 MCG: 50 INJECTION INTRAMUSCULAR; INTRAVENOUS at 08:05

## 2025-05-30 RX ADMIN — PROPOFOL 50 MCG/KG/MIN: 10 INJECTION, EMULSION INTRAVENOUS at 07:05

## 2025-05-30 RX ADMIN — PRAVASTATIN SODIUM 40 MG: 40 TABLET ORAL at 10:05

## 2025-05-30 RX ADMIN — IPRATROPIUM BROMIDE AND ALBUTEROL SULFATE 3 ML: 2.5; .5 SOLUTION RESPIRATORY (INHALATION) at 03:05

## 2025-05-30 RX ADMIN — IPRATROPIUM BROMIDE AND ALBUTEROL SULFATE 3 ML: 2.5; .5 SOLUTION RESPIRATORY (INHALATION) at 07:05

## 2025-05-30 RX ADMIN — CLOPIDOGREL BISULFATE 75 MG: 75 TABLET, FILM COATED ORAL at 10:05

## 2025-05-30 RX ADMIN — MUPIROCIN: 20 OINTMENT TOPICAL at 08:05

## 2025-05-30 RX ADMIN — SODIUM CHLORIDE, POTASSIUM CHLORIDE, SODIUM LACTATE AND CALCIUM CHLORIDE: 600; 310; 30; 20 INJECTION, SOLUTION INTRAVENOUS at 04:05

## 2025-05-30 RX ADMIN — DEXMEDETOMIDINE HYDROCHLORIDE 0.5 MCG/KG/HR: 4 INJECTION INTRAVENOUS at 01:05

## 2025-05-30 RX ADMIN — AMPICILLIN SODIUM AND SULBACTAM SODIUM 1.5 G: 1; .5 INJECTION, POWDER, FOR SOLUTION INTRAMUSCULAR; INTRAVENOUS at 06:05

## 2025-05-30 NOTE — CARE UPDATE
Pt extubated per MD order with documented ventilator parameters. MD at bedside to assist. Pt tolerated extubation well and placed on 3 LPM NC.

## 2025-05-30 NOTE — NURSING
"SICU PLAN OF CARE NOTE    Dx: Anaphylaxis; POD 1 TAVR    Goals of Care:  SBP >100; MAP >65    Vital Signs:  BP (!) 126/58   Pulse 67   Temp 98.4 °F (36.9 °C)   Resp (!) 29   Ht 6' 2.02" (1.88 m)   Wt 83.9 kg (185 lb)   SpO2 99%   BMI 23.74 kg/m²     Cardiac:  SB, NSR (after extubation and cessation of precedex)    Resp:  SpO2 95% on 3L NC    Neuro:  AAO x4, Follows Commands, and Moves All Extremities; c/o some numbness to R foot that improves with mobility    Gtts:  Norepinephrine off at 6PM. LR at 75 cc/h    Urine Output:  Urinary Catheter 260 cc/shift    Drains:  N/A    Diet:  NPO; failed john bedside swallow study and pending evaluation from SLP - Dr. Mancuso notified and changed abx to IV      Labs/Accuchecks:  AC/HS accuchecks; daily CBC, CMP, M    Skin:  All skin remains free from further injury.  L radial and L groin sites clean/dry/intact. Patient turned or repositioned self at least every Q2H, specialty bed working correctly/waffle mattress inflated and ICU bed working correctly.    Shift Events:  Extubated. OOBTC for 2 hours. Levo weaned after starting MIVF..  See flowsheet for further assessment/details.  Kim (daughter) updated on current condition/plan of care, questions answered, and emotional support provided.  MD updated on current condition, vitals, labs, and gtts.    "

## 2025-05-30 NOTE — EICU
Virtual ICU Quality Rounds    Admit Date: 5/28/2025  Hospital Day: 2    ICU Day: 1d 19h    24H Vital Sign Range:  Temp:  [97.5 °F (36.4 °C)-98.8 °F (37.1 °C)]   Pulse:  [45-88]   Resp:  [18-43]   BP: ()/(46-65)   SpO2:  [90 %-99 %]   Arterial Line BP: ()/(42-56)     VICU Surveillance Screening    Daily review of  line necessity(optional): Central line(s) in place and Urinary catheter in place    Central line type (optional): Triple lumen catheter    Central line indications : Multiple infusions    Castillo Indications : Critically ill in the intensive care unit requiring hourly urine output monitoring     LDA reconciliation : Yes

## 2025-05-30 NOTE — PLAN OF CARE
SICU PLAN OF CARE    Dx: Anaphylaxis    Goals of Care: MAP > 65    Vital Signs (last 12 hours):   Temp:  [97.5 °F (36.4 °C)-98.8 °F (37.1 °C)]   Pulse:  [45-69]   Resp:  [18-36]   BP: ()/(46-61)   SpO2:  [95 %-99 %]   Arterial Line BP: ()/(42-56)      Neuro: Intubated  and Sedated    Cardiac: Rhythm: sinus bradycardia    Respiratory: Ventilator; Mode: A/C, Volume Control, 40 % FiO2, 5 PEEP    Gtts: Norepinephrine, Propofol, and Precedex    Urine Output: Urethral Catheter  716 mL/shift    Drains: none    Diet: NPO      Labs/Accuchecks: accu checks ACHS    Skin:  wounds and incisions per documentation. Patient turned q2h, bony prominences protected, and mattress inflated/working correctly.     Shift Events:  Pt experienced intermittent bradycardia with HR in 41-44 range. 2 EKGs obtained MD notified. MD ordered continious monitoring and notification if EKGs show anyhting other than sinus bradycardia, orders carried out. Family updated on current condition/plan of care, questions answered, and emotional support provided.  MD updated on current condition, vitals, labs, and gtts.

## 2025-05-30 NOTE — SUBJECTIVE & OBJECTIVE
Interval History: NAEON. Afebrile. AAO x3. Patient intubated. Family at bedside. Denies any HA, chest pain or pressure, SOB, hemoptysis, abdominal pain, or weakness.      Review of Systems   Unable to perform ROS: Intubated     Objective:     Vital Signs (Most Recent):  Temp: 98.7 °F (37.1 °C) (05/30/25 0715)  Pulse: 64 (05/30/25 0800)  Resp: (!) 24 (05/30/25 0818)  BP: (!) 107/53 (05/30/25 0800)  SpO2: 96 % (05/30/25 0800) Vital Signs (24h Range):  Temp:  [97.5 °F (36.4 °C)-98.8 °F (37.1 °C)] 98.7 °F (37.1 °C)  Pulse:  [45-88] 64  Resp:  [18-43] 24  SpO2:  [90 %-99 %] 96 %  BP: ()/(46-65) 107/53  Arterial Line BP: ()/(42-56) 115/45     Weight: 83.9 kg (185 lb)  Body mass index is 23.74 kg/m².     SpO2: 96 %         Intake/Output Summary (Last 24 hours) at 5/30/2025 0846  Last data filed at 5/30/2025 0800  Gross per 24 hour   Intake 905.08 ml   Output 3246 ml   Net -2340.92 ml       Lines/Drains/Airways       Central Venous Catheter Line  Duration             Percutaneous Central Line - Triple Lumen  05/28/25 1305 1 day              Drain  Duration                  NG/OG Tube 05/29/25 1540 Center mouth <1 day         Urethral Catheter 05/29/25 1600 <1 day              Airway  Duration                  Airway - Non-Surgical 05/29/25 1400 Endotracheal Tube <1 day              Arterial Line  Duration             Arterial Line 05/29/25 1340 Right Radial <1 day              Peripheral Intravenous Line  Duration                  Peripheral IV - Single Lumen 05/28/25 1140 20 G Right Antecubital 1 day         Peripheral IV - Single Lumen 05/28/25 1230 20 G No Anterior;Left Forearm 1 day                       Physical Exam  Constitutional:       General: He is not in acute distress.     Appearance: He is not ill-appearing.   Cardiovascular:      Rate and Rhythm: Normal rate and regular rhythm.      Pulses: Normal pulses.      Heart sounds: Normal heart sounds.   Pulmonary:      Effort: Pulmonary effort is normal.    Abdominal:      General: Bowel sounds are normal. There is no distension.      Tenderness: There is no abdominal tenderness.   Musculoskeletal:         General: No swelling. Normal range of motion.   Skin:     General: Skin is warm.   Neurological:      General: No focal deficit present.      Mental Status: He is alert and oriented to person, place, and time.   Psychiatric:         Mood and Affect: Mood normal.            Significant Labs: ABG:   Recent Labs   Lab 05/29/25  1505 05/29/25  1553 05/30/25  0325   PH 7.234* 7.314* 7.462*   PCO2 59.7* 47.4* 33.1*   HCO3 25.2 24.1 23.6*   POCSATURATED 98 100 98   BE -2 -2 0   , CMP   Recent Labs   Lab 05/29/25  0403 05/29/25  1547 05/30/25  0445    140 144   K 3.5 4.1 4.0    108 112*   CO2 22* 21* 22*   * 144* 132*   BUN 29* 26* 25*   CREATININE 1.1 1.0 0.9   CALCIUM 8.8 8.4* 8.2*   PROT 6.6 6.2 6.1   ALBUMIN 3.7 3.5 3.4*   BILITOT 0.3 0.3 0.3   ALKPHOS 73 69 67   AST 17 18 14   ALT 11 11 11   ANIONGAP 14 11 10   , and CBC   Recent Labs   Lab 05/29/25  0403 05/29/25  1505 05/29/25  1547 05/29/25  1553 05/30/25  0445   WBC 16.64*  --  27.71*  --  15.49*   HGB 11.5*  --  11.1*  --  11.0*   HCT 34.2*   < > 33.2*   < > 33.2*     --  243  --  229    < > = values in this interval not displayed.       Significant Imaging: reviewed

## 2025-05-30 NOTE — ASSESSMENT & PLAN NOTE
- intubated and placed on mechanical ventilation  - AC/VC with TV of 500, FiO2 of 40 and PEEP of 5  - wean off vent as tolerated  - no more dark contents from OG tube

## 2025-05-30 NOTE — NURSING
Pt with bradycardia, HR 41-43. MD notified, ordered to monitor, obtain EKG, and call if HR < 40s. Orders carried out.

## 2025-05-30 NOTE — PROGRESS NOTES
[facility]  Cardiology  Progress Note    Patient Name: Dutch Olivier  MRN: [id number]  Admission Date: 5/28/2025  Hospital Length of Stay: 2 days  Code Status: No Order   Attending Physician: Musa Vallecillo MD   Primary Care Physician: David Arreola MD  Expected Discharge Date: 6/9/2025  Principal Problem:Anaphylaxis    Subjective:     Hospital Course:   Patient admitted for anaphylaxis. Started on epinephrine and levophed. Levophed and epinephrine discontinued. Scheduled for TAVR on 5/29. Intubated and placed on ventilator after successful procedure due to aspiration.     Interval History: NAEON. Afebrile. AAO x3. Patient intubated. Family at bedside. Denies any HA, chest pain or pressure, SOB, hemoptysis, abdominal pain, or weakness.      Review of Systems   Unable to perform ROS: Intubated     Objective:     Vital Signs (Most Recent):  Temp: 98.7 °F (37.1 °C) (05/30/25 0715)  Pulse: 64 (05/30/25 0800)  Resp: (!) 24 (05/30/25 0818)  BP: (!) 107/53 (05/30/25 0800)  SpO2: 96 % (05/30/25 0800) Vital Signs (24h Range):  Temp:  [97.5 °F (36.4 °C)-98.8 °F (37.1 °C)] 98.7 °F (37.1 °C)  Pulse:  [45-88] 64  Resp:  [18-43] 24  SpO2:  [90 %-99 %] 96 %  BP: ()/(46-65) 107/53  Arterial Line BP: ()/(42-56) 115/45     Weight: 83.9 kg (185 lb)  Body mass index is 23.74 kg/m².     SpO2: 96 %         Intake/Output Summary (Last 24 hours) at 5/30/2025 0846  Last data filed at 5/30/2025 0800  Gross per 24 hour   Intake 905.08 ml   Output 3246 ml   Net -2340.92 ml       Lines/Drains/Airways       Central Venous Catheter Line  Duration             Percutaneous Central Line - Triple Lumen  05/28/25 1305 1 day              Drain  Duration                  NG/OG Tube 05/29/25 1540 Center mouth <1 day         Urethral Catheter 05/29/25 1600 <1 day              Airway  Duration                  Airway - Non-Surgical 05/29/25 1400 Endotracheal Tube <1 day              Arterial Line   Duration             Arterial Line 05/29/25 1340 Right Radial <1 day              Peripheral Intravenous Line  Duration                  Peripheral IV - Single Lumen 05/28/25 1140 20 G Right Antecubital 1 day         Peripheral IV - Single Lumen 05/28/25 1230 20 G No Anterior;Left Forearm 1 day                       Physical Exam  Constitutional:       General: He is not in acute distress.     Appearance: He is not ill-appearing.   Cardiovascular:      Rate and Rhythm: Normal rate and regular rhythm.      Pulses: Normal pulses.      Heart sounds: Normal heart sounds.   Pulmonary:      Effort: Pulmonary effort is normal.   Abdominal:      General: Bowel sounds are normal. There is no distension.      Tenderness: There is no abdominal tenderness.   Musculoskeletal:         General: No swelling. Normal range of motion.   Skin:     General: Skin is warm.   Neurological:      General: No focal deficit present.      Mental Status: He is alert and oriented to person, place, and time.   Psychiatric:         Mood and Affect: Mood normal.            Significant Labs: ABG:   Recent Labs   Lab 05/29/25  1505 05/29/25  1553 05/30/25  0325   PH 7.234* 7.314* 7.462*   PCO2 59.7* 47.4* 33.1*   HCO3 25.2 24.1 23.6*   POCSATURATED 98 100 98   BE -2 -2 0   , CMP   Recent Labs   Lab 05/29/25  0403 05/29/25  1547 05/30/25  0445    140 144   K 3.5 4.1 4.0    108 112*   CO2 22* 21* 22*   * 144* 132*   BUN 29* 26* 25*   CREATININE 1.1 1.0 0.9   CALCIUM 8.8 8.4* 8.2*   PROT 6.6 6.2 6.1   ALBUMIN 3.7 3.5 3.4*   BILITOT 0.3 0.3 0.3   ALKPHOS 73 69 67   AST 17 18 14   ALT 11 11 11   ANIONGAP 14 11 10   , and CBC   Recent Labs   Lab 05/29/25  0403 05/29/25  1505 05/29/25  1547 05/29/25  1553 05/30/25  0445   WBC 16.64*  --  27.71*  --  15.49*   HGB 11.5*  --  11.1*  --  11.0*   HCT 34.2*   < > 33.2*   < > 33.2*     --  243  --  229    < > = values in this interval not displayed.       Significant Imaging:  reviewed  Assessment and Plan:     Assessment & Plan  Anaphylaxis  Prior to TAVR procedure, peripheral IV inserted with normal saline injected. Patient then became hypotensive, dyspneic, and diaphoretic, with rasha cross torso. Placed on NRB and given solumedrol, benadryl, and nebs. S/p omar, then started on epi gtt and levo gtt. Patient admitted to the ICU, since improved with ability to wean o2 and pressors. Unknown source, no medication administered prior to reaction. No h/o anaphylaxis, no significant allergies.   - Infectious workup ordered  - d/c'ed Epi   - Albuterol nebs scheduled  - Continue to monitor in the ICU  Aspiration of stomach contents  - intubated and placed on mechanical ventilation  - AC/VC with TV of 500, FiO2 of 40 and PEEP of 5  - wean off vent as tolerated  - no more dark contents from OG tube  Hyperlipemia  - Continue statin  COPD (chronic obstructive pulmonary disease)  - Albuterol nebs scheduled   Essential hypertension  - Hold antihypertensives   Aortic stenosis  H/o severe AS, presented for outpatient TAVR. Deferred due to anaphylactic reaction pre-procedure.   - TAVR on 5/29        VTE Risk Mitigation (From admission, onward)           Ordered     heparin (porcine) injection 5,000 Units  Every 8 hours         05/30/25 1133     IP VTE HIGH RISK PATIENT  Once         05/30/25 1133     Place sequential compression device  Until discontinued         05/30/25 0737     Place sequential compression device  Until discontinued         05/28/25 1066                    Judit Mancuso MD  PGY-1  Cardiology  [facility]

## 2025-05-31 LAB
ABSOLUTE EOSINOPHIL (OHS): 0.01 K/UL
ABSOLUTE MONOCYTE (OHS): 1.15 K/UL (ref 0.3–1)
ABSOLUTE NEUTROPHIL COUNT (OHS): 9.21 K/UL (ref 1.8–7.7)
ALBUMIN SERPL BCP-MCNC: 3.1 G/DL (ref 3.5–5.2)
ALP SERPL-CCNC: 61 UNIT/L (ref 40–150)
ALT SERPL W/O P-5'-P-CCNC: 10 UNIT/L (ref 10–44)
ANION GAP (OHS): 9 MMOL/L (ref 8–16)
AST SERPL-CCNC: 18 UNIT/L (ref 11–45)
BASOPHILS # BLD AUTO: 0.03 K/UL
BASOPHILS NFR BLD AUTO: 0.2 %
BILIRUB SERPL-MCNC: 0.8 MG/DL (ref 0.1–1)
BUN SERPL-MCNC: 25 MG/DL (ref 8–23)
CALCIUM SERPL-MCNC: 8.4 MG/DL (ref 8.7–10.5)
CHLORIDE SERPL-SCNC: 113 MMOL/L (ref 95–110)
CO2 SERPL-SCNC: 24 MMOL/L (ref 23–29)
CREAT SERPL-MCNC: 1 MG/DL (ref 0.5–1.4)
ERYTHROCYTE [DISTWIDTH] IN BLOOD BY AUTOMATED COUNT: 13.5 % (ref 11.5–14.5)
GFR SERPLBLD CREATININE-BSD FMLA CKD-EPI: >60 ML/MIN/1.73/M2
GLUCOSE SERPL-MCNC: 106 MG/DL (ref 70–110)
HCT VFR BLD AUTO: 30.9 % (ref 40–54)
HGB BLD-MCNC: 10.1 GM/DL (ref 14–18)
IMM GRANULOCYTES # BLD AUTO: 0.08 K/UL (ref 0–0.04)
IMM GRANULOCYTES NFR BLD AUTO: 0.6 % (ref 0–0.5)
LYMPHOCYTES # BLD AUTO: 2.05 K/UL (ref 1–4.8)
MAGNESIUM SERPL-MCNC: 2.3 MG/DL (ref 1.6–2.6)
MCH RBC QN AUTO: 29.8 PG (ref 27–31)
MCHC RBC AUTO-ENTMCNC: 32.7 G/DL (ref 32–36)
MCV RBC AUTO: 91 FL (ref 82–98)
NUCLEATED RBC (/100WBC) (OHS): 0 /100 WBC
PLATELET # BLD AUTO: 195 K/UL (ref 150–450)
PMV BLD AUTO: 10 FL (ref 9.2–12.9)
POCT GLUCOSE: 110 MG/DL (ref 70–110)
POCT GLUCOSE: 119 MG/DL (ref 70–110)
POCT GLUCOSE: 79 MG/DL (ref 70–110)
POCT GLUCOSE: 93 MG/DL (ref 70–110)
POTASSIUM SERPL-SCNC: 3.5 MMOL/L (ref 3.5–5.1)
POTASSIUM SERPL-SCNC: 3.6 MMOL/L (ref 3.5–5.1)
PROT SERPL-MCNC: 6.1 GM/DL (ref 6–8.4)
RBC # BLD AUTO: 3.39 M/UL (ref 4.6–6.2)
RELATIVE EOSINOPHIL (OHS): 0.1 %
RELATIVE LYMPHOCYTE (OHS): 16.4 % (ref 18–48)
RELATIVE MONOCYTE (OHS): 9.2 % (ref 4–15)
RELATIVE NEUTROPHIL (OHS): 73.5 % (ref 38–73)
SODIUM SERPL-SCNC: 146 MMOL/L (ref 136–145)
WBC # BLD AUTO: 12.53 K/UL (ref 3.9–12.7)

## 2025-05-31 PROCEDURE — 25000003 PHARM REV CODE 250: Performed by: INTERNAL MEDICINE

## 2025-05-31 PROCEDURE — 84132 ASSAY OF SERUM POTASSIUM: CPT

## 2025-05-31 PROCEDURE — 27000221 HC OXYGEN, UP TO 24 HOURS

## 2025-05-31 PROCEDURE — 85025 COMPLETE CBC W/AUTO DIFF WBC: CPT

## 2025-05-31 PROCEDURE — 63600175 PHARM REV CODE 636 W HCPCS

## 2025-05-31 PROCEDURE — 97116 GAIT TRAINING THERAPY: CPT

## 2025-05-31 PROCEDURE — 83735 ASSAY OF MAGNESIUM: CPT

## 2025-05-31 PROCEDURE — 99900035 HC TECH TIME PER 15 MIN (STAT)

## 2025-05-31 PROCEDURE — 80053 COMPREHEN METABOLIC PANEL: CPT

## 2025-05-31 PROCEDURE — 20000000 HC ICU ROOM

## 2025-05-31 PROCEDURE — 99232 SBSQ HOSP IP/OBS MODERATE 35: CPT | Mod: ,,, | Performed by: INTERNAL MEDICINE

## 2025-05-31 PROCEDURE — 25000242 PHARM REV CODE 250 ALT 637 W/ HCPCS

## 2025-05-31 PROCEDURE — 97530 THERAPEUTIC ACTIVITIES: CPT

## 2025-05-31 PROCEDURE — 97165 OT EVAL LOW COMPLEX 30 MIN: CPT

## 2025-05-31 PROCEDURE — 25000003 PHARM REV CODE 250: Performed by: STUDENT IN AN ORGANIZED HEALTH CARE EDUCATION/TRAINING PROGRAM

## 2025-05-31 PROCEDURE — 94640 AIRWAY INHALATION TREATMENT: CPT

## 2025-05-31 PROCEDURE — 94761 N-INVAS EAR/PLS OXIMETRY MLT: CPT

## 2025-05-31 PROCEDURE — 97535 SELF CARE MNGMENT TRAINING: CPT

## 2025-05-31 PROCEDURE — 97162 PT EVAL MOD COMPLEX 30 MIN: CPT

## 2025-05-31 PROCEDURE — 92610 EVALUATE SWALLOWING FUNCTION: CPT

## 2025-05-31 PROCEDURE — 51798 US URINE CAPACITY MEASURE: CPT

## 2025-05-31 PROCEDURE — 63600175 PHARM REV CODE 636 W HCPCS: Performed by: INTERNAL MEDICINE

## 2025-05-31 PROCEDURE — 63600175 PHARM REV CODE 636 W HCPCS: Performed by: STUDENT IN AN ORGANIZED HEALTH CARE EDUCATION/TRAINING PROGRAM

## 2025-05-31 PROCEDURE — 25000003 PHARM REV CODE 250

## 2025-05-31 RX ORDER — ASPIRIN 300 MG/1
150 SUPPOSITORY RECTAL DAILY
Status: DISCONTINUED | OUTPATIENT
Start: 2025-05-31 | End: 2025-06-02

## 2025-05-31 RX ORDER — POTASSIUM CHLORIDE 750 MG/1
30 CAPSULE, EXTENDED RELEASE ORAL ONCE
Status: DISCONTINUED | OUTPATIENT
Start: 2025-05-31 | End: 2025-05-31

## 2025-05-31 RX ORDER — SODIUM CHLORIDE 9 MG/ML
INJECTION, SOLUTION INTRAVENOUS CONTINUOUS
Status: ACTIVE | OUTPATIENT
Start: 2025-05-31 | End: 2025-06-01

## 2025-05-31 RX ORDER — ONDANSETRON 4 MG/1
4 TABLET, ORALLY DISINTEGRATING ORAL EVERY 8 HOURS PRN
Status: DISCONTINUED | OUTPATIENT
Start: 2025-05-31 | End: 2025-05-31

## 2025-05-31 RX ORDER — POTASSIUM CHLORIDE 29.8 MG/ML
40 INJECTION INTRAVENOUS ONCE
Status: COMPLETED | OUTPATIENT
Start: 2025-05-31 | End: 2025-05-31

## 2025-05-31 RX ORDER — ONDANSETRON HYDROCHLORIDE 2 MG/ML
INJECTION, SOLUTION INTRAVENOUS
Status: COMPLETED
Start: 2025-05-31 | End: 2025-05-31

## 2025-05-31 RX ORDER — SODIUM CHLORIDE 9 MG/ML
INJECTION, SOLUTION INTRAVENOUS CONTINUOUS
Status: DISCONTINUED | OUTPATIENT
Start: 2025-05-31 | End: 2025-05-31

## 2025-05-31 RX ORDER — ONDANSETRON HYDROCHLORIDE 2 MG/ML
4 INJECTION, SOLUTION INTRAVENOUS EVERY 8 HOURS PRN
Status: DISCONTINUED | OUTPATIENT
Start: 2025-05-31 | End: 2025-06-04 | Stop reason: HOSPADM

## 2025-05-31 RX ORDER — SODIUM CHLORIDE, SODIUM GLUCONATE, SODIUM ACETATE, POTASSIUM CHLORIDE AND MAGNESIUM CHLORIDE 30; 37; 368; 526; 502 MG/100ML; MG/100ML; MG/100ML; MG/100ML; MG/100ML
INJECTION, SOLUTION INTRAVENOUS CONTINUOUS
Status: DISCONTINUED | OUTPATIENT
Start: 2025-05-31 | End: 2025-05-31

## 2025-05-31 RX ORDER — PANTOPRAZOLE SODIUM 40 MG/10ML
40 INJECTION, POWDER, LYOPHILIZED, FOR SOLUTION INTRAVENOUS DAILY
Status: DISCONTINUED | OUTPATIENT
Start: 2025-05-31 | End: 2025-06-03

## 2025-05-31 RX ORDER — DIPHENHYDRAMINE HYDROCHLORIDE 50 MG/ML
25 INJECTION, SOLUTION INTRAMUSCULAR; INTRAVENOUS EVERY 6 HOURS PRN
Status: DISCONTINUED | OUTPATIENT
Start: 2025-05-31 | End: 2025-06-03

## 2025-05-31 RX ADMIN — AMPICILLIN SODIUM AND SULBACTAM SODIUM 3 G: 2; 1 INJECTION, POWDER, FOR SOLUTION INTRAMUSCULAR; INTRAVENOUS at 06:05

## 2025-05-31 RX ADMIN — HEPARIN SODIUM 5000 UNITS: 5000 INJECTION INTRAVENOUS; SUBCUTANEOUS at 05:05

## 2025-05-31 RX ADMIN — ASPIRIN 150 MG: 300 SUPPOSITORY RECTAL at 04:05

## 2025-05-31 RX ADMIN — ONDANSETRON 4 MG: 2 INJECTION INTRAMUSCULAR; INTRAVENOUS at 07:05

## 2025-05-31 RX ADMIN — AMPICILLIN SODIUM AND SULBACTAM SODIUM 1.5 G: 1; .5 INJECTION, POWDER, FOR SOLUTION INTRAMUSCULAR; INTRAVENOUS at 05:05

## 2025-05-31 RX ADMIN — PANTOPRAZOLE SODIUM 40 MG: 40 INJECTION, POWDER, LYOPHILIZED, FOR SOLUTION INTRAVENOUS at 09:05

## 2025-05-31 RX ADMIN — MUPIROCIN: 20 OINTMENT TOPICAL at 09:05

## 2025-05-31 RX ADMIN — IPRATROPIUM BROMIDE AND ALBUTEROL SULFATE 3 ML: 2.5; .5 SOLUTION RESPIRATORY (INHALATION) at 03:05

## 2025-05-31 RX ADMIN — SODIUM CHLORIDE: 9 INJECTION, SOLUTION INTRAVENOUS at 07:05

## 2025-05-31 RX ADMIN — AMPICILLIN SODIUM AND SULBACTAM SODIUM 3 G: 2; 1 INJECTION, POWDER, FOR SOLUTION INTRAMUSCULAR; INTRAVENOUS at 11:05

## 2025-05-31 RX ADMIN — HEPARIN SODIUM 5000 UNITS: 5000 INJECTION INTRAVENOUS; SUBCUTANEOUS at 09:05

## 2025-05-31 RX ADMIN — POTASSIUM CHLORIDE 40 MEQ: 29.8 INJECTION, SOLUTION INTRAVENOUS at 02:05

## 2025-05-31 RX ADMIN — DEXTROSE MONOHYDRATE 500 ML: 50 INJECTION, SOLUTION INTRAVENOUS at 09:05

## 2025-05-31 RX ADMIN — IPRATROPIUM BROMIDE AND ALBUTEROL SULFATE 3 ML: 2.5; .5 SOLUTION RESPIRATORY (INHALATION) at 11:05

## 2025-05-31 RX ADMIN — IPRATROPIUM BROMIDE AND ALBUTEROL SULFATE 3 ML: 2.5; .5 SOLUTION RESPIRATORY (INHALATION) at 12:05

## 2025-05-31 RX ADMIN — DIPHENHYDRAMINE HYDROCHLORIDE 25 MG: 50 INJECTION, SOLUTION INTRAMUSCULAR; INTRAVENOUS at 03:05

## 2025-05-31 RX ADMIN — HYDROMORPHONE HYDROCHLORIDE 0.2 MG: 2 INJECTION, SOLUTION INTRAMUSCULAR; INTRAVENOUS; SUBCUTANEOUS at 01:05

## 2025-05-31 RX ADMIN — ONDANSETRON HYDROCHLORIDE 4 MG: 2 INJECTION, SOLUTION INTRAVENOUS at 07:05

## 2025-05-31 RX ADMIN — HEPARIN SODIUM 5000 UNITS: 5000 INJECTION INTRAVENOUS; SUBCUTANEOUS at 02:05

## 2025-05-31 RX ADMIN — IPRATROPIUM BROMIDE AND ALBUTEROL SULFATE 3 ML: 2.5; .5 SOLUTION RESPIRATORY (INHALATION) at 05:05

## 2025-05-31 RX ADMIN — IPRATROPIUM BROMIDE AND ALBUTEROL SULFATE 3 ML: 2.5; .5 SOLUTION RESPIRATORY (INHALATION) at 09:05

## 2025-05-31 RX ADMIN — POTASSIUM CHLORIDE 40 MEQ: 29.8 INJECTION, SOLUTION INTRAVENOUS at 05:05

## 2025-05-31 NOTE — EICU
[facility] Night Rounds Checklist  24H Vital Sign Range:  Temp:  [98.4 °F (36.9 °C)-100.6 °F (38.1 °C)]   Pulse:  []   Resp:  [12-41]   BP: ()/(46-69)   SpO2:  [92 %-100 %]   Arterial Line BP: ()/(34-68)     Video rounds

## 2025-05-31 NOTE — PT/OT/SLP EVAL
Physical Therapy Co-Evaluation and Co-Treatment    Patient Name:  Dutch Olivier   MRN:  [id number]  Admit Date: 5/28/2025  Admitting Diagnosis:  Anaphylaxis   Length of Stay: 3 days  Recent Surgery: Procedure(s) (LRB):  REPLACEMENT, AORTIC VALVE, TRANSCATHETER (TAVR) (N/A)  Cardiac Cath Cosurgeon (N/A)  REPLACEMENT, AORTIC VALVE, TRANSCATHETER (TAVR)  PTA, Iliac Artery 2 Days Post-Op    Recommendations:     Discharge Recommendations: Moderate Intensity Therapy  Discharge Equipment Recommendations: bedside commode   Barriers to discharge: Decreased caregiver support and increased need for skilled assistance    Plan:     During this hospitalization, patient to be seen 4 x/week to address the identified rehab impairments via therapeutic exercises, therapeutic activities, gait training, neuromuscular re-education and progress towards the established goals.  Plan of Care Expires:  06/30/25  Plan of Care Reviewed with: patient, daughter    Assessment:     Dutch Olivier is a 79 y.o. male admitted with a medical diagnosis of Anaphylaxis. He presents with weakness, impaired endurance, impaired self care skills, impaired functional mobility, gait instability, impaired balance, impaired cardiopulmonary response to activity, decreased safety awareness, decreased lower extremity function, decreased upper extremity function.    Pt presents POD2 s/p TAVR. Pt presents with BLE weakness, reports of R foot numbness, impaired balance and decreased activity tolerance requiring increased (A) compared to baseline PLOF. Pt completed short distance gait trial with RW but limited by drop in cuff MAP from 93 to 77 upon standing and with intermittent dizziness reported during mobility.     Patient would benefit from skilled therapy services to maximize safety and independence, increase activity tolerance, decrease fall risk, decrease caregiver burden, improve QOL, improve patient's functional mobility, and decrease risk of contractures and  "pressure sores. Patient currently demonstrates a need for moderate intensity therapy on a daily basis post acute secondary to a decline in functional status due to surgical procedure    Rehab Prognosis: Good; patient would benefit from acute skilled PT services to address these deficits and reach maximum level of function.      Subjective     RN notified prior to session. Daughters present upon PT entrance into room. Patient agreeable to PT evaluation.    Chief Complaint: some dizziness with mobility   Patient/Family Comments/goals: return to PLOF  Pain/Comfort:  Pain Rating 1: 0/10  Pain Rating Post-Intervention 1: 0/10    Social History:  Residence: lives with wife and daughter 2-story house with 3 HAMZAH and B HR (wide). Pt's bathroom has a WIS with built in bench.  Support available: none, has not needed  Equipment Owned (not using): wheelchair, walker, rolling, walker, standard, rollator  Equipment Used: None  Prior level of function: independent; drives; still works some in [employer]       Patient reports they will have assistance from family upon discharge.    Objective:     Additional staff present: OT; OT for co-evaluation due to suspected patient need for two skilled therapists to appropriately assess patient's functional deficits as well as ensure patient safety, accommodate for limited activity tolerance, and provide appropriate, skilled assistance to maximize functional potential during evaluation.    Patient found up in chair with: blood pressure cuff, telemetry, pulse ox (continuous), central line, peripheral IV, arterial line, anna catheter     General Precautions: Standard, Cardiac fall   Orthopedic Precautions:N/A   Braces: N/A   Body mass index is 23.74 kg/m².  Oxygen Device: Room Air  Vitals: BP (!) 149/66 (BP Location: Right arm, Patient Position: Sitting)   Pulse 73   Temp 98.2 °F (36.8 °C) (Oral)   Resp (!) 25   Ht 6' 2.02" (1.88 m)   Wt 83.9 kg (185 lb)   SpO2 96%   BMI 23.74 " kg/m²     Exams:    Cognition:  Alert and Cooperative  Command following: Follows multistep verbal commands  Communication: clear/fluent    Sensation:   Light touch sensation: Pt reported generalized numbness/tingling of R foot    Gross Motor Coordination: No deficits noted during functional mobility tasks     Edema/Skin Integrity: None noted; Visible skin intact    Postural examination/scapula alignment: Rounded shoulder, Head forward, and Slouched posture    Lower Extremity Range of Motion:  Right Lower Extremity: WFL  Left Lower Extremity: WFL    Lower Extremity Strength:  Right Lower Extremity: WFL except hip flexors 3+/5 and ankle DF 4/5  Left Lower Extremity: WFL except hip flexors 3+/5      Functional Mobility:    Bed Mobility:  Pt found/returned to bedside chair    Transfers:   Sit <> Stand Transfer: Minimal Assistance x 2 trials from chair with RW               Gait:  Distance: 8'  Assistance level: Minimal Assistance  Assistive Device: rolling walker  Gait Deviation(s): occasional unsteady gait, decreased step length, narrow base of support, flexed posture, and decreased curtis  all lines remained intact throughout ambulation trial  Comments: Pt with increased L lateral lean during gait trial but no overt LOB. Cues for upright stance and fwd gaze due to pt's FFP    Balance:  Standing:  Static: contact guard assistance with RW; min A no AD  Dynamic: minimum assistance with RW    Treatment and Education:    Upon standing, pt with drop in MAP via cuff from 93 to 77 with pt reporting some dizziness  Short gait trial completed but trial ended 2/2 intermittent dizziness with hypotension.    Time provided for education, counseling and discussion of health disposition in regards to patient's current status  All questions answered within PT scope of practice and to patient's satisfaction  PT role in POC to address current functional deficits  Call nursing/pct to transfer to chair/use bathroom. Pt stated  understanding.    AM-PAC 6 CLICK MOBILITY  Turning over in bed (including adjusting bedclothes, sheets and blankets)?: 3  Sitting down on and standing up from a chair with arms (e.g., wheelchair, bedside commode, etc.): 3  Moving from lying on back to sitting on the side of the bed?: 3  Moving to and from a bed to a chair (including a wheelchair)?: 3  Need to walk in hospital room?: 3  Climbing 3-5 steps with a railing?: 1  Basic Mobility Total Score: 16     Patient left up in chair with all lines intact, call button in reach, and RN and family present.    Goals:   Multidisciplinary Problems       Physical Therapy Goals          Problem: Physical Therapy    Goal Priority Disciplines Outcome Interventions   Physical Therapy Goal     PT, PT/OT Progressing    Description: Goals to be met by: 25     Patient will increase functional independence with mobility by performin. Supine to sit with Stand-by Assistance  2. Sit to stand transfer with Supervision  3. Bed to chair transfer with Supervision using LRAD  4. Gait  x 100 feet with Stand-by Assistance using LRAD.   5. Ascend/descend 3 stair with bilateral Handrails Contact Guard Assistance using LRAD.   6. Stand for 5 minutes with Stand-by Assistance using LRAD                         DME Justifications:   Dutch HINSON requires a commode for home use because he is confined to a single room.  History:     Past Medical History:   Diagnosis Date    Alcohol abuse, daily use     2024 stopped    Amblyopia     OS    Aortic valve stenosis     Cancer     melanoma, prostate    Cataract     Complication of anesthesia     says he sometimes is slow to awaken    COPD (chronic obstructive pulmonary disease)     no oxygen, no inhalers, or nebulizers    Diverticulosis     Gross hematuria     History of colonic polyps     History of malignant melanoma 2011    right ear, had chemo//Dr Gongora    HTN (hypertension)     Hyperlipemia     Kidney stone        Past Surgical  History:   Procedure Laterality Date    CARDIAC CATH COSURGEON N/A 5/29/2025    Procedure: Cardiac Cath Cosurgeon;  Surgeon: Jose Antonio Langston MD;  Location: [facility] CATH LAB;  Service: Cardiothoracic;  Laterality: N/A;    CORONARY ANGIOGRAPHY N/A 5/16/2025    Procedure: ANGIOGRAM, CORONARY ARTERY;  Surgeon: Gume Blood MD;  Location: [facility] CATH LAB;  Service: Cardiology;  Laterality: N/A;    cystolithopaxy      CYSTOSCOPIC LITHOLAPAXY N/A 3/25/2025    Procedure: CYSTOLITHOLAPAXY - with Thulium Laser - Bladder Stone;  Surgeon: EZ Kwan MD;  Location: [facility] OR;  Service: Urology;  Laterality: N/A;    EXTERNAL EAR SURGERY  01/01/2011    melanoma removed right ear    HERNIA REPAIR      RIH    PORTACATH PLACEMENT  01/01/2011    later removed    PROSTATE SURGERY  01/01/2003    prostatectomy    PTA, ILIAC ARTERY  5/29/2025    Procedure: PTA, Iliac Artery;  Surgeon: Gume Blood MD;  Location: [facility] CATH LAB;  Service: Cardiology;;  SHOCKWAVE    TRANSCATHETER AORTIC VALVE REPLACEMENT (TAVR) N/A 5/29/2025    Procedure: REPLACEMENT, AORTIC VALVE, TRANSCATHETER (TAVR);  Surgeon: Gume Blood MD;  Location: [facility] CATH LAB;  Service: Cardiology;  Laterality: N/A;    TRANSCATHETER AORTIC VALVE REPLACEMENT (TAVR)  5/29/2025    Procedure: REPLACEMENT, AORTIC VALVE, TRANSCATHETER (TAVR);  Surgeon: Jose Antonio Langston MD;  Location: [facility] CATH LAB;  Service: Cardiothoracic;;       Family History   Problem Relation Name Age of Onset    Heart attack Mother      Blindness Father      Alcohol abuse Father      Alcohol abuse Brother      Drug abuse Brother      Heart defect Maternal Uncle      Cancer Neg Hx      Heart disease Neg Hx      Diabetes Neg Hx      Glaucoma Neg Hx      Macular degeneration Neg Hx      Retinal detachment Neg Hx         Social History     Socioeconomic History    Marital status:     Number of children: 5   Occupational History     Employer: [employer]   Tobacco Use    Smoking  status: Every Day     Current packs/day: 1.00     Types: Cigarettes     Passive exposure: Current    Smokeless tobacco: Never   Vaping Use    Vaping status: Never Used   Substance and Sexual Activity    Alcohol use: Not Currently     Comment: 6-7 nightly for yrs/ stopped 2024    Drug use: No    Sexual activity: Yes     Partners: Female     Social Drivers of Health     Financial Resource Strain: Low Risk  (5/29/2025)    Overall Financial Resource Strain (CARDIA)     Difficulty of Paying Living Expenses: Not hard at all   Food Insecurity: No Food Insecurity (5/29/2025)    Hunger Vital Sign     Worried About Running Out of Food in the Last Year: Never true     Ran Out of Food in the Last Year: Never true   Transportation Needs: No Transportation Needs (5/29/2025)    PRAPARE - Transportation     Lack of Transportation (Medical): No     Lack of Transportation (Non-Medical): No   Physical Activity: Sufficiently Active (5/29/2025)    Exercise Vital Sign     Days of Exercise per Week: 7 days     Minutes of Exercise per Session: 60 min   Stress: No Stress Concern Present (5/29/2025)    [employer] [place] [employer] - Occupational Stress Questionnaire     Feeling of Stress : Not at all   Housing Stability: Low Risk  (5/29/2025)    Housing Stability Vital Sign     Unable to Pay for Housing in the Last Year: No     Homeless in the Last Year: No       Time Tracking:     PT Received On: 05/31/25  PT Start Time: 1111     PT Stop Time: 1128  PT Total Time (min): 17 min     Billable Minutes: Evaluation 9 minutes and Gait Training 8 minutes    5/31/2025

## 2025-05-31 NOTE — PLAN OF CARE
PT evaluation completed- see note for details. PT POC and goals established.    Problem: Physical Therapy  Goal: Physical Therapy Goal  Description: Goals to be met by: 25     Patient will increase functional independence with mobility by performin. Supine to sit with Stand-by Assistance  2. Sit to stand transfer with Supervision  3. Bed to chair transfer with Supervision using LRAD  4. Gait  x 100 feet with Stand-by Assistance using LRAD.   5. Ascend/descend 3 stair with bilateral Handrails Contact Guard Assistance using LRAD.   6. Stand for 5 minutes with Stand-by Assistance using LRAD    Outcome: Progressing

## 2025-05-31 NOTE — NURSING
"SICU PLAN OF CARE NOTE    Dx: Anaphylaxis    Goals of Care:  MAP >65; SBP <180    Vital Signs:  BP (!) 143/66   Pulse 73   Temp 99 °F (37.2 °C) (Oral)   Resp (!) 27   Ht 6' 2.02" (1.88 m)   Wt 83.9 kg (185 lb)   SpO2 100%   BMI 23.74 kg/m²     Cardiac:  NSR    Resp:  SpO2 94% on room air    Neuro:  AAO x4, Follows Commands, and Moves All Extremities RLE numbness to toes     Gtts:  MIVF and Abx    Urine Output:  Voids Spontaneously 180 cc/shift; bladder scanned with reading of 162 cc urine    Drains:  n/a    Diet:  NPO except ice chips, sparingly      Labs/Accuchecks:  Q4H accuchecks    Skin:  All skin remains free from injury.  Patient turned or repositioned self at least every Q2H, specialty bed working correctly/waffle mattress inflated and ICU bed working correctly.    Shift Events:  CT of head obtained. OOBTC. ASA suppository .  See flowsheet for further assessment/details.  Family updated on current condition/plan of care, questions answered, and emotional support provided.  MD updated on current condition, vitals, labs, and gtts.    "

## 2025-05-31 NOTE — PLAN OF CARE
Problem: SLP  Goal: SLP Goal  Description: Speech Language Pathology Goals  Goals expected to be met by 6/13    1. Pt will participate in ongoing swallow assessment  Outcome: Progressing     Bedside swallow assessment completed. Pt with decreased tolerance of PO trials. Recommend NPO at this time. SLP will continue to follow.

## 2025-05-31 NOTE — SUBJECTIVE & OBJECTIVE
Interval History: NAEON. Afebrile. AAO x3. Patient sitting up in chair. Daughter at bedside. Denies any HA, chest pain or pressure, SOB, hemoptysis, abdominal pain, or weakness. Failed swallow test with speech. Maintaining IVF.      Review of Systems   Constitutional: Negative for diaphoresis and fever.   HENT:  Negative for congestion and sore throat.    Eyes: Negative.    Cardiovascular:  Negative for chest pain, irregular heartbeat and palpitations.   Respiratory:  Negative for cough and shortness of breath.    Endocrine: Negative.    Hematologic/Lymphatic: Negative.    Skin:  Negative for itching and rash.   Musculoskeletal:  Negative for joint pain and myalgias.   Gastrointestinal:  Negative for abdominal pain, heartburn, nausea and vomiting.   Genitourinary:  Negative for dysuria and hematuria.   Neurological:  Negative for dizziness, headaches and light-headedness.   Psychiatric/Behavioral: Negative.       Objective:     Vital Signs (Most Recent):  Temp: 98.7 °F (37.1 °C) (05/31/25 0701)  Pulse: 74 (05/31/25 0845)  Resp: (!) 30 (05/31/25 0845)  BP: (!) 102/52 (05/31/25 0830)  SpO2: (!) 94 % (05/31/25 0845) Vital Signs (24h Range):  Temp:  [98.4 °F (36.9 °C)-100.6 °F (38.1 °C)] 98.7 °F (37.1 °C)  Pulse:  [] 74  Resp:  [12-57] 30  SpO2:  [92 %-100 %] 94 %  BP: ()/(46-93) 102/52  Arterial Line BP: ()/(33-68) 147/58     Weight: 83.9 kg (185 lb)  Body mass index is 23.74 kg/m².     SpO2: (!) 94 %         Intake/Output Summary (Last 24 hours) at 5/31/2025 0910  Last data filed at 5/31/2025 0800  Gross per 24 hour   Intake 1548.11 ml   Output 862 ml   Net 686.11 ml       Lines/Drains/Airways       Central Venous Catheter Line  Duration             Percutaneous Central Line - Triple Lumen  05/28/25 1305 2 days              Drain  Duration                  Urethral Catheter 05/29/25 1600 1 day              Arterial Line  Duration             Arterial Line 05/29/25 1340 Right Radial 1 day               Peripheral Intravenous Line  Duration                  Peripheral IV - Single Lumen 05/28/25 1140 20 G Right Antecubital 2 days         Peripheral IV - Single Lumen 05/28/25 1230 20 G No Anterior;Left Forearm 2 days                       Physical Exam  Constitutional:       General: He is not in acute distress.     Appearance: He is not ill-appearing.   Cardiovascular:      Rate and Rhythm: Normal rate and regular rhythm.      Pulses: Normal pulses.      Heart sounds: Murmur heard.      Systolic murmur is present with a grade of 3/6.      No friction rub. No gallop.   Pulmonary:      Effort: Pulmonary effort is normal.      Breath sounds: Normal breath sounds.   Abdominal:      General: Bowel sounds are normal. There is no distension.      Tenderness: There is no abdominal tenderness.   Musculoskeletal:         General: No swelling. Normal range of motion.   Skin:     General: Skin is warm.   Neurological:      General: No focal deficit present.      Mental Status: He is alert and oriented to person, place, and time.   Psychiatric:         Mood and Affect: Mood normal.            Significant Labs: CMP   Recent Labs   Lab 05/29/25  1547 05/30/25  0445 05/31/25  0320    144 146*   K 4.1 4.0 3.5    112* 113*   CO2 21* 22* 24   * 132* 106   BUN 26* 25* 25*   CREATININE 1.0 0.9 1.0   CALCIUM 8.4* 8.2* 8.4*   PROT 6.2 6.1 6.1   ALBUMIN 3.5 3.4* 3.1*   BILITOT 0.3 0.3 0.8   ALKPHOS 69 67 61   AST 18 14 18   ALT 11 11 10   ANIONGAP 11 10 9    and CBC   Recent Labs   Lab 05/29/25  1547 05/29/25  1553 05/30/25  0445 05/30/25  1216 05/31/25  0320   WBC 27.71*  --  15.49*  --  12.53   HGB 11.1*  --  11.0*  --  10.1*   HCT 33.2*   < > 33.2*   < > 30.9*     --  229  --  195    < > = values in this interval not displayed.       Significant Imaging: reviewed

## 2025-05-31 NOTE — PT/OT/SLP EVAL
Occupational Therapy  Co- Evaluation    Name: Dutch Olivier  MRN: [id number]  Admitting Diagnosis: Anaphylaxis  Recent Surgery: Procedure(s) (LRB):  REPLACEMENT, AORTIC VALVE, TRANSCATHETER (TAVR) (N/A)  Cardiac Cath Cosurgeon (N/A)  REPLACEMENT, AORTIC VALVE, TRANSCATHETER (TAVR)  PTA, Iliac Artery 2 Days Post-Op    Recommendations:     Discharge Recommendations: Moderate Intensity Therapy (may progress)  Discharge Equipment Recommendations:  walker, rolling, bedside commode  Barriers to discharge:  None    Assessment:     Dutch Olivier is a 79 y.o. male with a medical diagnosis of Anaphylaxis.  He presents with decent overall strength, but progress limited by symptomatic hTN. Per RN, team wanting to use cuff for pressures, and pt with mild drop from 93 to 77 mmHg in standing, but c/o dizziness and with increased HR. Performance deficits affecting function: weakness, impaired endurance, impaired coordination, impaired self care skills, impaired functional mobility, gait instability, impaired balance, impaired cardiopulmonary response to activity, decreased safety awareness. Pt benefits from co-treatment with PT to accommodate pt's activity tolerance and progression with therapy.      Rehab Prognosis: Good; patient would benefit from acute skilled OT services to address these deficits and reach maximum level of function.       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  Patient found up in chair with blood pressure cuff, pulse ox (continuous), telemetry, central line, anna upon OT entry to room.    General Precautions: Standard, fall, aspiration  Orthopedic Precautions:    Braces:    Respiratory Status:     Occupational Performance: Room air    Bed Mobility:    NT as pt UIC pre and post session    Functional Mobility/Transfers:  Patient completed Sit <> Stand Transfer with minimum assistance  with  rolling walker from b/s chair  Functional Mobility: Min A using RW x ~6' forward; distance limited 2* drop in cuff pressures and pt c/o intermittent dizziness    Activities of Daily Living:  Grooming: modified independence completed in sitting  Upper Body Dressing: minimum assistance    Lower Body Dressing: moderate assistance      Cognitive/Visual Perceptual:  Oriented to: Person, Place, Time and Situation  Follows Commands/attention: Follows multistep  commands  Communication: clear/fluent  Memory:  No Deficits noted  Safety awareness/insight to disability: intact  Coping skills/emotional control: Appropriate to situation    Physical Exam:  Postural examination/scapula alignment:    -       No postural abnormalities identified  Sensation:    -       Intact  Upper Extremity Range of Motion:     -       Right Upper Extremity: WFL  -       Left Upper Extremity: WFL  Upper Extremity Strength:    -       Right Upper Extremity: WFL  -       Left Upper Extremity: WFL   Strength:    -       Right Upper Extremity: WFL  -       Left Upper Extremity: WFL  Fine Motor Coordination:    -       Intact  Gross motor coordination:   WFL    AMPAC 6 Click ADL:  AMPAC Total Score: 16    Treatment &  Education:  Pt ed on OT POC  Pt stood with Min A for static standing without AD and CGA using RW  Pt ed on ROM ex's daily for increased overall strength and endurance to reduce risk of hospital acquired weakness  Pt ed on safety with ADL and fall risk  Reinforced use of call button to contact nursing staff for assistance with mobility    Patient left up in chair with all lines intact, call button in reach, RN notified, and dtrs present    GOALS:   Multidisciplinary Problems       Occupational Therapy Goals          Problem: Occupational Therapy    Goal Priority Disciplines Outcome Interventions   Occupational Therapy Goal     OT, PT/OT Progressing    Description: Goals to be met by: 6/7/25     Patient will increase functional independence with ADLs by performing:    Feeding with Modified [place].  UE Dressing with Stand by assistance  LE Dressing with  Contacty Guard assistance  Grooming while standing at sink with Stand-by Assistance.  Toileting from toilet with Contact Guard Assistance for hygiene and clothing management.   Toilet transfer to toilet with Contact Guard Assistance.                         DME Justifications:   Dutch HINSON requires a commode for home use because he is confined to a single room.   Dutch HINSON's mobility limitation cannot be sufficiently resolved by the use of a cane. His functional mobility deficit can be sufficiently resolved with the use of a Rolling Walker. Patient's mobility limitation significantly impairs their ability to participate in one of more activities of daily living.  The use of a RW will significantly improve the patient's ability to participate in MRADLS and the patient will use it on regular basis in the home.    History:     Past Medical History:   Diagnosis Date    Alcohol abuse, daily use     8/2024 stopped    Amblyopia     OS    Aortic valve stenosis     Cancer     melanoma, prostate    Cataract     Complication of anesthesia     says he sometimes is slow to  awaken    COPD (chronic obstructive pulmonary disease)     no oxygen, no inhalers, or nebulizers    Diverticulosis     Gross hematuria     History of colonic polyps     History of malignant melanoma 01/01/2011    right ear, had chemo//Dr Riverst    HTN (hypertension)     Hyperlipemia     Kidney stone          Past Surgical History:   Procedure Laterality Date    CARDIAC CATH COSURGEON N/A 5/29/2025    Procedure: Cardiac Cath Cosurgeon;  Surgeon: Jose Antonio Langston MD;  Location: [facility] CATH LAB;  Service: Cardiothoracic;  Laterality: N/A;    CORONARY ANGIOGRAPHY N/A 5/16/2025    Procedure: ANGIOGRAM, CORONARY ARTERY;  Surgeon: Gume Blood MD;  Location: [facility] CATH LAB;  Service: Cardiology;  Laterality: N/A;    cystolithopaxy      CYSTOSCOPIC LITHOLAPAXY N/A 3/25/2025    Procedure: CYSTOLITHOLAPAXY - with Thulium Laser - Bladder Stone;  Surgeon: EZ Kwan MD;  Location[facility];  Service: Urology;  Laterality: N/A;    EXTERNAL EAR SURGERY  01/01/2011    melanoma removed right ear    HERNIA REPAIR      [facility]    PORTACATH PLACEMENT  01/01/2011    later removed    PROSTATE SURGERY  01/01/2003    prostatectomy    PTA, ILIAC ARTERY  5/29/2025    Procedure: PTA, Iliac Artery;  Surgeon: Gume Blood MD;  Location: [facility] CATH LAB;  Service: Cardiology;;  SHOCKWAVE    TRANSCATHETER AORTIC VALVE REPLACEMENT (TAVR) N/A 5/29/2025    Procedure: REPLACEMENT, AORTIC VALVE, TRANSCATHETER (TAVR);  Surgeon: Gume Blood MD;  Location: [facility] CATH LAB;  Service: Cardiology;  Laterality: N/A;    TRANSCATHETER AORTIC VALVE REPLACEMENT (TAVR)  5/29/2025    Procedure: REPLACEMENT, AORTIC VALVE, TRANSCATHETER (TAVR);  Surgeon: Jose Antonio Langsotn MD;  Location: [facility] CATH LAB;  Service: Cardiothoracic;;       Time Tracking:     OT Date of Treatment: 05/31/25  OT Start Time: 1112  OT Stop Time: 1133  OT Total Time (min): 21 min    Billable Minutes:Evaluation 10  Therapeutic Activity 10    5/31/2025

## 2025-05-31 NOTE — EICU
Intervention Initiated From:  [facility] / EICU    Holden intervened regarding:  Rounding (Video assessment)    Virtual ICU Quality Rounds    Admit Date: 5/28/2025  Hospital Day: 3    ICU Day: 2d 22h    24H Vital Sign Range:  Temp:  [98.4 °F (36.9 °C)-100.6 °F (38.1 °C)]   Pulse:  []   Resp:  [12-57]   BP: ()/(46-93)   SpO2:  [92 %-100 %]   Arterial Line BP: ()/(33-68)     VICU Surveillance Screening    Daily review of  line necessity(optional): Central line(s) in place and Urinary catheter in place    Central line type (optional): Triple lumen catheter    Central line indications : Hemodynamic instability    Castillo Indications : Critically ill in the intensive care unit requiring hourly urine output monitoring     LDA reconciliation : Yes

## 2025-05-31 NOTE — PLAN OF CARE
SICU PLAN OF CARE    Dx: Anaphylaxis    Goals of Care: MAP > 65, SBP > 100    Vital Signs (last 12 hours):   Temp:  [98.4 °F (36.9 °C)-100.6 °F (38.1 °C)]   Pulse:  []   Resp:  [12-41]   BP: ()/(46-69)   SpO2:  [92 %-100 %]   Arterial Line BP: ()/(34-68)      Neuro: AAOx4, Follows commands , and Moves all extremities spontaneously     Cardiac: Rhythm: normal sinus rhythm    Respiratory: 3L Nasal Cannula     Gtts: MIVF    Urine Output: Urethral Catheter  492 mL/shift    Drains: none    Diet: NPO , pending speech evaluation     Labs/Accuchecks: accu checks ACHS, daily labs    Skin:  wounds and incisions per documentation.  Patient turned q2h, bony prominences protected, and mattress inflated/working correctly.     Shift Events:  Pt with mild numbness of the R toes, pulses via doppler/palpation, MD notified. No acute events this shift. Family updated on current condition/plan of care, questions answered, and emotional support provided.  MD updated on current condition, vitals, labs, and gtts.

## 2025-05-31 NOTE — PLAN OF CARE
Problem: Occupational Therapy  Goal: Occupational Therapy Goal  Description: Goals to be met by: 6/7/25     Patient will increase functional independence with ADLs by performing:    Feeding with Modified [place].  UE Dressing with Stand by assistance  LE Dressing with  Contacty Guard assistance  Grooming while standing at sink with Stand-by Assistance.  Toileting from toilet with Contact Guard Assistance for hygiene and clothing management.   Toilet transfer to toilet with Contact Guard Assistance.    Outcome: Progressing

## 2025-05-31 NOTE — PROGRESS NOTES
[facility] - Surgical Intensive Care  Cardiology  Progress Note    Patient Name: Dutch Olivier  MRN: [id number]  Admission Date: 5/28/2025  Hospital Length of Stay: 3 days  Code Status: No Order   Attending Physician: Musa Vallecillo MD   Primary Care Physician: David Arreola MD  Expected Discharge Date: 6/9/2025  Principal Problem:Anaphylaxis    Subjective:     Hospital Course:   Patient admitted for anaphylaxis. Started on epinephrine and levophed. Levophed and epinephrine discontinued. Scheduled for TAVR on 5/29. Intubated and placed on ventilator after successful procedure due to aspiration. Failed swallow test. Remain NPO. Continuing IVF. CT head for possible stroke after concerns for dysphagia after procedure.    Interval History: NAEON. Afebrile. AAO x3. Patient sitting up in chair. Daughter at bedside. Denies any HA, chest pain or pressure, SOB, hemoptysis, abdominal pain, or weakness. Failed swallow test with speech. Maintaining IVF.      Review of Systems   Constitutional: Negative for diaphoresis and fever.   HENT:  Negative for congestion and sore throat.    Eyes: Negative.    Cardiovascular:  Negative for chest pain, irregular heartbeat and palpitations.   Respiratory:  Negative for cough and shortness of breath.    Endocrine: Negative.    Hematologic/Lymphatic: Negative.    Skin:  Negative for itching and rash.   Musculoskeletal:  Negative for joint pain and myalgias.   Gastrointestinal:  Negative for abdominal pain, heartburn, nausea and vomiting.   Genitourinary:  Negative for dysuria and hematuria.   Neurological:  Negative for dizziness, headaches and light-headedness.   Psychiatric/Behavioral: Negative.       Objective:     Vital Signs (Most Recent):  Temp: 98.7 °F (37.1 °C) (05/31/25 0701)  Pulse: 74 (05/31/25 0845)  Resp: (!) 30 (05/31/25 0845)  BP: (!) 102/52 (05/31/25 0830)  SpO2: (!) 94 % (05/31/25 0845) Vital Signs (24h Range):  Temp:  [98.4 °F (36.9 °C)-100.6 °F (38.1 °C)]  98.7 °F (37.1 °C)  Pulse:  [] 74  Resp:  [12-57] 30  SpO2:  [92 %-100 %] 94 %  BP: ()/(46-93) 102/52  Arterial Line BP: ()/(33-68) 147/58     Weight: 83.9 kg (185 lb)  Body mass index is 23.74 kg/m².     SpO2: (!) 94 %         Intake/Output Summary (Last 24 hours) at 5/31/2025 0910  Last data filed at 5/31/2025 0800  Gross per 24 hour   Intake 1548.11 ml   Output 862 ml   Net 686.11 ml       Lines/Drains/Airways       Central Venous Catheter Line  Duration             Percutaneous Central Line - Triple Lumen  05/28/25 1305 2 days              Drain  Duration                  Urethral Catheter 05/29/25 1600 1 day              Arterial Line  Duration             Arterial Line 05/29/25 1340 Right Radial 1 day              Peripheral Intravenous Line  Duration                  Peripheral IV - Single Lumen 05/28/25 1140 20 G Right Antecubital 2 days         Peripheral IV - Single Lumen 05/28/25 1230 20 G No Anterior;Left Forearm 2 days                       Physical Exam  Constitutional:       General: He is not in acute distress.     Appearance: He is not ill-appearing.   Cardiovascular:      Rate and Rhythm: Normal rate and regular rhythm.      Pulses: Normal pulses.      Heart sounds: Murmur heard.      Systolic murmur is present with a grade of 3/6.      No friction rub. No gallop.   Pulmonary:      Effort: Pulmonary effort is normal.      Breath sounds: Normal breath sounds.   Abdominal:      General: Bowel sounds are normal. There is no distension.      Tenderness: There is no abdominal tenderness.   Musculoskeletal:         General: No swelling. Normal range of motion.   Skin:     General: Skin is warm.   Neurological:      General: No focal deficit present.      Mental Status: He is alert and oriented to person, place, and time.   Psychiatric:         Mood and Affect: Mood normal.            Significant Labs: CMP   Recent Labs   Lab 05/29/25  1547 05/30/25  0445 05/31/25  0320    144 146*    K 4.1 4.0 3.5    112* 113*   CO2 21* 22* 24   * 132* 106   BUN 26* 25* 25*   CREATININE 1.0 0.9 1.0   CALCIUM 8.4* 8.2* 8.4*   PROT 6.2 6.1 6.1   ALBUMIN 3.5 3.4* 3.1*   BILITOT 0.3 0.3 0.8   ALKPHOS 69 67 61   AST 18 14 18   ALT 11 11 10   ANIONGAP 11 10 9    and CBC   Recent Labs   Lab 05/29/25  1547 05/29/25  1553 05/30/25  0445 05/30/25  1216 05/31/25  0320   WBC 27.71*  --  15.49*  --  12.53   HGB 11.1*  --  11.0*  --  10.1*   HCT 33.2*   < > 33.2*   < > 30.9*     --  229  --  195    < > = values in this interval not displayed.       Significant Imaging: reviewed  Assessment and Plan:     Assessment & Plan  Anaphylaxis  Prior to TAVR procedure, peripheral IV inserted with normal saline injected. Patient then became hypotensive, dyspneic, and diaphoretic, with rasha cross torso. Placed on NRB and given solumedrol, benadryl, and nebs. S/p omar, then started on epi gtt and levo gtt. Patient admitted to the ICU, since improved with ability to wean o2 and pressors. Unknown source, no medication administered prior to reaction. No h/o anaphylaxis, no significant allergies.   - Infectious workup ordered  - d/c'ed Epi   - Albuterol nebs scheduled  - Continue to monitor in the ICU  Aortic stenosis  H/o severe AS, presented for outpatient TAVR. Deferred due to anaphylactic reaction pre-procedure.   - TAVR on 5/29  - aspirin 125 suppository  - benadryl 25mg IV to premedicate  Aspiration of stomach contents  - intubated and placed on mechanical ventilation  - AC/VC with TV of 500, FiO2 of 40 and PEEP of 5  - wean off vent as tolerated  - no more dark contents from OG tube  - tube pulled; failed speech therapy  - starting fluids for hydration and calories  Hyperlipemia  - Continue statin  COPD (chronic obstructive pulmonary disease)  - Albuterol nebs scheduled   Essential hypertension  - Hold antihypertensives         VTE Risk Mitigation (From admission, onward)           Ordered     heparin (porcine)  injection 5,000 Units  Every 8 hours         05/30/25 1133     IP VTE HIGH RISK PATIENT  Once         05/30/25 1133     Place sequential compression device  Until discontinued         05/30/25 0737     Place sequential compression device  Until discontinued         05/28/25 6423                    Judit Mancuso MD  PGY-1  Cardiology  [facility] - Surgical Intensive [facility]

## 2025-05-31 NOTE — ASSESSMENT & PLAN NOTE
- intubated and placed on mechanical ventilation  - AC/VC with TV of 500, FiO2 of 40 and PEEP of 5  - wean off vent as tolerated  - no more dark contents from OG tube  - tube pulled; failed speech therapy  - starting fluids for hydration and calories

## 2025-05-31 NOTE — ASSESSMENT & PLAN NOTE
H/o severe AS, presented for outpatient TAVR. Deferred due to anaphylactic reaction pre-procedure.   - TAVR on 5/29  - aspirin 125 suppository  - benadryl 25mg IV to premedicate

## 2025-05-31 NOTE — PROGRESS NOTES
Pharmacist Renal Dose Adjustment Note    Dutch Olivier is a 79 y.o. male being treated with the medication ampicillin/sulbactam (Unasyn)    Patient Data:    Vital Signs (Most Recent):  Temp: 98.7 °F (37.1 °C) (05/31/25 0701)  Pulse: 74 (05/31/25 0745)  Resp: (!) 37 (05/31/25 0745)  BP: 139/65 (05/31/25 0730)  SpO2: 95 % (05/31/25 0745) Vital Signs (72h Range):  Temp:  [97.5 °F (36.4 °C)-100.6 °F (38.1 °C)]   Pulse:  []   Resp:  [12-61]   BP: ()/(0-93)   SpO2:  [87 %-100 %]   Arterial Line BP: ()/()      Recent Labs   Lab 05/29/25  1547 05/30/25  0445 05/31/25  0320   CREATININE 1.0 0.9 1.0     Serum creatinine: 1 mg/dL 05/31/25 0320  Estimated creatinine clearance: 67.4 mL/min    Medication: Unasyn 1.5 g every 6 hours will be changed to medication: Unasyn 3 g every 8 hours.     Pharmacist's Name: Brian Judge  Pharmacist's Extension: [phone]

## 2025-05-31 NOTE — PT/OT/SLP EVAL
Speech Language Pathology Evaluation  Bedside Swallow    Patient Name:  Dutch Olivier   MRN:  [id number]  Admitting Diagnosis: Anaphylaxis    Recommendations:                 General Recommendations:  ongoing swallowing assessment  Diet recommendations:  NPO, NPO   Aspiration Precautions: Frequent oral care, Ice chips sparingly, and Strict aspiration precautions   General Precautions: Standard, aspiration, fall  Communication strategies:  none    Assessment:     Dutch Olivier is a 79 y.o. male with an SLP diagnosis of Dysphagia.  He presents with increased risk of aspiration and aspiration related complications.    History:     Past Medical History:   Diagnosis Date    Alcohol abuse, daily use     8/2024 stopped    Amblyopia     OS    Aortic valve stenosis     Cancer     melanoma, prostate    Cataract     Complication of anesthesia     says he sometimes is slow to awaken    COPD (chronic obstructive pulmonary disease)     no oxygen, no inhalers, or nebulizers    Diverticulosis     Gross hematuria     History of colonic polyps     History of malignant melanoma 01/01/2011    right ear, had chemo//Dr Gongora    HTN (hypertension)     Hyperlipemia     Kidney stone        Past Surgical History:   Procedure Laterality Date    CARDIAC CATH COSURGEON N/A 5/29/2025    Procedure: Cardiac Cath Cosurgeon;  Surgeon: Jose Antonio Langston MD;  Location: [facility] CATH LAB;  Service: Cardiothoracic;  Laterality: N/A;    CORONARY ANGIOGRAPHY N/A 5/16/2025    Procedure: ANGIOGRAM, CORONARY ARTERY;  Surgeon: Gume Blood MD;  Location: [facility] CATH LAB;  Service: Cardiology;  Laterality: N/A;    cystolithopaxy      CYSTOSCOPIC LITHOLAPAXY N/A 3/25/2025    Procedure: CYSTOLITHOLAPAXY - with Thulium Laser - Bladder Stone;  Surgeon: EZ Kwan MD;  Location: [facility];  Service: Urology;  Laterality: N/A;    EXTERNAL EAR SURGERY  01/01/2011    melanoma removed right ear    HERNIA REPAIR      [facility]    PORTACATH PLACEMENT  01/01/2011     "later removed    PROSTATE SURGERY  01/01/2003    prostatectomy    PTA, ILIAC ARTERY  5/29/2025    Procedure: PTA, Iliac Artery;  Surgeon: Gume Blood MD;  Location: [facility] CATH LAB;  Service: Cardiology;;  SHOCKWAVE    TRANSCATHETER AORTIC VALVE REPLACEMENT (TAVR) N/A 5/29/2025    Procedure: REPLACEMENT, AORTIC VALVE, TRANSCATHETER (TAVR);  Surgeon: Gume Blood MD;  Location: [facility] CATH LAB;  Service: Cardiology;  Laterality: N/A;    TRANSCATHETER AORTIC VALVE REPLACEMENT (TAVR)  5/29/2025    Procedure: REPLACEMENT, AORTIC VALVE, TRANSCATHETER (TAVR);  Surgeon: Jose Antonio Langston MD;  Location: [facility] CATH LAB;  Service: Cardiothoracic;;     Hospital Course:   Patient admitted for anaphylaxis. Started on epinephrine and levophed. Levophed and epinephrine discontinued. Scheduled for TAVR on 5/29. Intubated and placed on ventilator after successful procedure due to aspiration.     Prior Intubation HX:  5/29-5/30    Chest X-Rays: 5/29/25 "Impression:  Endotracheal tube terminates 7.4 cm above the laura, consider advancement.  Line projects over the left hemithorax.  Possibly representing malpositioned PICC line.  Clinical correlation is needed.  Prominent pulmonary vascular and interstitial markings which may reflect pulmonary edema."    Prior diet: Pt currently NPO. Baseline diet is regular/thin    Subjective     Pt awake and alert upon arrival. Daughter present at bedside. Nursing cleared for session.     Pain/Comfort:  Pain Rating 1: 0/10    Respiratory Status: Nasal cannula, flow 2 L/min    Objective:     Oral Musculature Evaluation  Oral Musculature: WFL  Dentition: present and adequate  Oral Labial Strength and Mobility: WFL  Lingual Strength and Mobility: WFL  Volitional Cough: elicited, weak  Volitional Swallow: elicited  Voice Prior to PO Intake: hoarse    Bedside Swallow Eval:   Consistencies Assessed:  Ice chips x3  Thin liquids via tsp x3, open edge cup x5, straw x3  Puree via tsp x5    Oral Phase: "   WFL    Pharyngeal Phase:   Throat clearing with thin liquids   Intermittent wet vocal quality with thin liquids that was cleared with cued throat clear  Intermittent coughing response with puree  Grimacing of face with swallowing though denied pain    Compensatory Strategies  None    Treatment: Pt reporting increase in phlegm and hoarse vocal quality that has both improved but are not at baseline. Pt with decreased tolerance of PO trials. Pt with facial grimacing with swallowing though denied pain/discomfort. Recommend NPO at this time. Pt okay to have ice chips sparingly for pleasure. SLP provided education regarding role of SLP, aspiration precautions, signs of aspiration, risk of aspiration, NPO, recommendations, ice chips sparingly, and SLP POC. Pt and daughter expressed understanding. SLP will continue to follow.     Goals:   Multidisciplinary Problems       SLP Goals          Problem: SLP    Goal Priority Disciplines Outcome   SLP Goal     SLP Progressing   Description: Speech Language Pathology Goals  Goals expected to be met by 6/13    1. Pt will participate in ongoing swallow assessment                          Plan:     Patient to be seen:  4 x/week   Plan of Care expires:  06/27/25  Plan of Care reviewed with:  patient, daughter   SLP Follow-Up:  Yes       Discharge recommendations:   (tbd)     Time Tracking:     SLP Treatment Date:   05/31/25  Speech Start Time:  0732  Speech Stop Time:  0748     Speech Total Time (min):  16 min    Billable Minutes: Eval Swallow and Oral Function 8 and Self Care/Home Management Training 8    05/31/2025

## 2025-06-01 LAB
ABSOLUTE EOSINOPHIL (OHS): 0.03 K/UL
ABSOLUTE MONOCYTE (OHS): 1.01 K/UL (ref 0.3–1)
ABSOLUTE NEUTROPHIL COUNT (OHS): 9.24 K/UL (ref 1.8–7.7)
ALBUMIN SERPL BCP-MCNC: 3 G/DL (ref 3.5–5.2)
ALP SERPL-CCNC: 63 UNIT/L (ref 40–150)
ALT SERPL W/O P-5'-P-CCNC: 11 UNIT/L (ref 10–44)
ANION GAP (OHS): 10 MMOL/L (ref 8–16)
AST SERPL-CCNC: 21 UNIT/L (ref 11–45)
BASOPHILS # BLD AUTO: 0.03 K/UL
BASOPHILS NFR BLD AUTO: 0.3 %
BILIRUB SERPL-MCNC: 0.7 MG/DL (ref 0.1–1)
BUN SERPL-MCNC: 19 MG/DL (ref 8–23)
CALCIUM SERPL-MCNC: 8.3 MG/DL (ref 8.7–10.5)
CHLORIDE SERPL-SCNC: 116 MMOL/L (ref 95–110)
CO2 SERPL-SCNC: 19 MMOL/L (ref 23–29)
CREAT SERPL-MCNC: 0.8 MG/DL (ref 0.5–1.4)
ERYTHROCYTE [DISTWIDTH] IN BLOOD BY AUTOMATED COUNT: 13.2 % (ref 11.5–14.5)
GFR SERPLBLD CREATININE-BSD FMLA CKD-EPI: >60 ML/MIN/1.73/M2
GLUCOSE SERPL-MCNC: 92 MG/DL (ref 70–110)
HCT VFR BLD AUTO: 30 % (ref 40–54)
HGB BLD-MCNC: 9.6 GM/DL (ref 14–18)
IMM GRANULOCYTES # BLD AUTO: 0.05 K/UL (ref 0–0.04)
IMM GRANULOCYTES NFR BLD AUTO: 0.4 % (ref 0–0.5)
INDIRECT COOMBS: NORMAL
LYMPHOCYTES # BLD AUTO: 1.54 K/UL (ref 1–4.8)
MAGNESIUM SERPL-MCNC: 2.2 MG/DL (ref 1.6–2.6)
MCH RBC QN AUTO: 29.7 PG (ref 27–31)
MCHC RBC AUTO-ENTMCNC: 32 G/DL (ref 32–36)
MCV RBC AUTO: 93 FL (ref 82–98)
NUCLEATED RBC (/100WBC) (OHS): 0 /100 WBC
PLATELET # BLD AUTO: 175 K/UL (ref 150–450)
PMV BLD AUTO: 9.7 FL (ref 9.2–12.9)
POCT GLUCOSE: 101 MG/DL (ref 70–110)
POCT GLUCOSE: 138 MG/DL (ref 70–110)
POCT GLUCOSE: 145 MG/DL (ref 70–110)
POCT GLUCOSE: 89 MG/DL (ref 70–110)
POTASSIUM SERPL-SCNC: 3.7 MMOL/L (ref 3.5–5.1)
PROT SERPL-MCNC: 6.1 GM/DL (ref 6–8.4)
RBC # BLD AUTO: 3.23 M/UL (ref 4.6–6.2)
RELATIVE EOSINOPHIL (OHS): 0.3 %
RELATIVE LYMPHOCYTE (OHS): 12.9 % (ref 18–48)
RELATIVE MONOCYTE (OHS): 8.5 % (ref 4–15)
RELATIVE NEUTROPHIL (OHS): 77.6 % (ref 38–73)
RH BLD: NORMAL
SODIUM SERPL-SCNC: 145 MMOL/L (ref 136–145)
SPECIMEN OUTDATE: NORMAL
WBC # BLD AUTO: 11.9 K/UL (ref 3.9–12.7)

## 2025-06-01 PROCEDURE — 94761 N-INVAS EAR/PLS OXIMETRY MLT: CPT

## 2025-06-01 PROCEDURE — 94640 AIRWAY INHALATION TREATMENT: CPT

## 2025-06-01 PROCEDURE — 20600001 HC STEP DOWN PRIVATE ROOM

## 2025-06-01 PROCEDURE — 80053 COMPREHEN METABOLIC PANEL: CPT

## 2025-06-01 PROCEDURE — 25000003 PHARM REV CODE 250: Performed by: STUDENT IN AN ORGANIZED HEALTH CARE EDUCATION/TRAINING PROGRAM

## 2025-06-01 PROCEDURE — 25000003 PHARM REV CODE 250

## 2025-06-01 PROCEDURE — 63600175 PHARM REV CODE 636 W HCPCS: Performed by: INTERNAL MEDICINE

## 2025-06-01 PROCEDURE — 99232 SBSQ HOSP IP/OBS MODERATE 35: CPT | Mod: ,,, | Performed by: INTERNAL MEDICINE

## 2025-06-01 PROCEDURE — 63600175 PHARM REV CODE 636 W HCPCS

## 2025-06-01 PROCEDURE — 63600175 PHARM REV CODE 636 W HCPCS: Performed by: STUDENT IN AN ORGANIZED HEALTH CARE EDUCATION/TRAINING PROGRAM

## 2025-06-01 PROCEDURE — 25000003 PHARM REV CODE 250: Performed by: INTERNAL MEDICINE

## 2025-06-01 PROCEDURE — 83735 ASSAY OF MAGNESIUM: CPT

## 2025-06-01 PROCEDURE — 20000000 HC ICU ROOM

## 2025-06-01 PROCEDURE — 85025 COMPLETE CBC W/AUTO DIFF WBC: CPT

## 2025-06-01 PROCEDURE — 92526 ORAL FUNCTION THERAPY: CPT

## 2025-06-01 PROCEDURE — 99900035 HC TECH TIME PER 15 MIN (STAT)

## 2025-06-01 PROCEDURE — 86900 BLOOD TYPING SEROLOGIC ABO: CPT | Performed by: INTERNAL MEDICINE

## 2025-06-01 PROCEDURE — 25000242 PHARM REV CODE 250 ALT 637 W/ HCPCS

## 2025-06-01 RX ORDER — POTASSIUM CHLORIDE 29.8 MG/ML
40 INJECTION INTRAVENOUS ONCE
Status: COMPLETED | OUTPATIENT
Start: 2025-06-01 | End: 2025-06-01

## 2025-06-01 RX ORDER — CLOPIDOGREL BISULFATE 75 MG/1
75 TABLET ORAL DAILY
Qty: 90 TABLET | Refills: 3 | Status: SHIPPED | OUTPATIENT
Start: 2025-06-02 | End: 2026-06-02

## 2025-06-01 RX ADMIN — CLOPIDOGREL BISULFATE 75 MG: 75 TABLET, FILM COATED ORAL at 09:06

## 2025-06-01 RX ADMIN — IPRATROPIUM BROMIDE AND ALBUTEROL SULFATE 3 ML: 2.5; .5 SOLUTION RESPIRATORY (INHALATION) at 11:06

## 2025-06-01 RX ADMIN — AMPICILLIN SODIUM AND SULBACTAM SODIUM 3 G: 2; 1 INJECTION, POWDER, FOR SOLUTION INTRAMUSCULAR; INTRAVENOUS at 11:06

## 2025-06-01 RX ADMIN — IPRATROPIUM BROMIDE AND ALBUTEROL SULFATE 3 ML: 2.5; .5 SOLUTION RESPIRATORY (INHALATION) at 12:06

## 2025-06-01 RX ADMIN — HEPARIN SODIUM 5000 UNITS: 5000 INJECTION INTRAVENOUS; SUBCUTANEOUS at 06:06

## 2025-06-01 RX ADMIN — IPRATROPIUM BROMIDE AND ALBUTEROL SULFATE 3 ML: 2.5; .5 SOLUTION RESPIRATORY (INHALATION) at 03:06

## 2025-06-01 RX ADMIN — BICALUTAMIDE 50 MG: 50 TABLET ORAL at 09:06

## 2025-06-01 RX ADMIN — AMPICILLIN SODIUM AND SULBACTAM SODIUM 3 G: 2; 1 INJECTION, POWDER, FOR SOLUTION INTRAMUSCULAR; INTRAVENOUS at 06:06

## 2025-06-01 RX ADMIN — HEPARIN SODIUM 5000 UNITS: 5000 INJECTION INTRAVENOUS; SUBCUTANEOUS at 09:06

## 2025-06-01 RX ADMIN — IPRATROPIUM BROMIDE AND ALBUTEROL SULFATE 3 ML: 2.5; .5 SOLUTION RESPIRATORY (INHALATION) at 07:06

## 2025-06-01 RX ADMIN — MUPIROCIN: 20 OINTMENT TOPICAL at 09:06

## 2025-06-01 RX ADMIN — AMPICILLIN SODIUM AND SULBACTAM SODIUM 3 G: 2; 1 INJECTION, POWDER, FOR SOLUTION INTRAMUSCULAR; INTRAVENOUS at 05:06

## 2025-06-01 RX ADMIN — POTASSIUM CHLORIDE 40 MEQ: 29.8 INJECTION, SOLUTION INTRAVENOUS at 07:06

## 2025-06-01 RX ADMIN — AMPICILLIN SODIUM AND SULBACTAM SODIUM 3 G: 2; 1 INJECTION, POWDER, FOR SOLUTION INTRAMUSCULAR; INTRAVENOUS at 12:06

## 2025-06-01 RX ADMIN — PANTOPRAZOLE SODIUM 40 MG: 40 INJECTION, POWDER, LYOPHILIZED, FOR SOLUTION INTRAVENOUS at 09:06

## 2025-06-01 RX ADMIN — PRAVASTATIN SODIUM 40 MG: 40 TABLET ORAL at 09:06

## 2025-06-01 NOTE — EICU
[facility] Night Rounds Checklist  24H Vital Sign Range:  Temp:  [98.2 °F (36.8 °C)-99.7 °F (37.6 °C)]   Pulse:  [58-83]   Resp:  [17-67]   BP: (102-167)/(52-93)   SpO2:  [91 %-100 %]   Arterial Line BP: ()/(33-58)     Video rounds

## 2025-06-01 NOTE — PT/OT/SLP PROGRESS
"Speech Language Pathology Treatment    Patient Name:  Dutch Olivier   MRN:  [id number]  Admitting Diagnosis: Anaphylaxis    Recommendations:                 General Recommendations:  Follow up with speech therapy in the outpatient setting   Diet recommendations:  Regular Diet - IDDSI Level 7, Liquid Diet Level: Thin liquids - IDDSI Level 0   Aspiration Precautions: 1 bite/sip at a time, slow rate of intake, HOB to 90 degrees, Small bites/sips, and Standard aspiration precautions   General Precautions: Standard, aspiration, fall  Communication strategies:  go to room if call light pushed    Assessment:     Dutch Olivier is a 79 y.o. male with an oropharyngeal swallow that appears functional for PO intake of regular solids and thin liquids given small, single bites and sips presented 1 at a time. Pt may benefit from an SLP follow-up in the outpatient setting if coughing with PO intake persists despite use of safe swallow precautions.     Subjective     Pt awake/alert and cooperative. Family present at bedside.     "I'm so glad you could come today. I'm doing much better."     Pain/Comfort:  Pain Rating 1: 0/10  Pain Rating Post-Intervention 1: 0/10    Respiratory Status: Room air    Objective:     Has the patient been evaluated by SLP for swallowing?   Yes  Keep patient NPO? No       Pt seen for re-evaluation of his oropharyngeal swallow. Pt seated upright in bedside chair upon SLP entry. Pt endorsed an improvement in his overall presentation this date. He reports tolerating sips of thin liquids for pleasure. Hoarse vocal quality noted though clear. Pt accepted ~8 oz of water via cup and straw, 4 oz of puree, and 1 whole korina cracker. Pt with adequate labial seal around utensils. No evidence of anterior spillage. Pt displayed adequate bolus prep and timely mastication of regular solids. No evidence of oral residue post swallow. Initially, pt with large, consecutive sips of thin liquids and large bites of puree " resulting in immediate coughing and throat clearing. Coughing/throat clearing reduced/eliminated with small, single sips/bites presented at a slow rate. Vocal quality remained clear. Pt with coughing outside of PO intake as well. Family endorse pt with a baseline cough 2/2 COPD diagnosis. Education provided re: role of SLP, diet recs, swallow precs, small bites/sips, slow rate of intake, 1 bite/sip at a time, s/s aspiration, rationale for outpatient follow up if coughing/throat clearing persists despite use of safe swallow precautions, and POC.  Pt and family verbalized understanding and agreement. Pt with all needs met and no report of pain upon SLP exit. At this time, pt safe for a diet upgrade to regular solids and thin liquids given implementation of safe swallow precautions. MD and RN notified on overall impressions/recommendations.       Goals:   Multidisciplinary Problems       SLP Goals          Problem: SLP    Goal Priority Disciplines Outcome   SLP Goal     SLP Progressing   Description: Speech Language Pathology Goals  Goals expected to be met by 6/13    1. Pt will participate in ongoing swallow assessment                          Plan:     Patient to be seen:  4 x/week   Plan of Care expires:  06/27/25  Plan of Care reviewed with:  patient, family   SLP Follow-Up:  Yes       Discharge recommendations:   (tbd)   Barriers to Discharge:  None    Time Tracking:     SLP Treatment Date:   06/01/25  Speech Start Time:  1331  Speech Stop Time:  1344     Speech Total Time (min):  13 min    Billable Minutes: Treatment Swallowing Dysfunction 13    06/01/2025

## 2025-06-01 NOTE — PROGRESS NOTES
[facility] - Surgical Intensive Care  Cardiology  Progress Note    Patient Name: Dutch Olivier  MRN: [id number]  Admission Date: 5/28/2025  Hospital Length of Stay: 4 days  Code Status: No Order   Attending Physician: Musa Vallecillo MD   Primary Care Physician: David Arreola MD  Expected Discharge Date: 6/9/2025  Principal Problem:Anaphylaxis    Subjective:     Hospital Course:   Patient admitted for anaphylaxis. Started on epinephrine and levophed. Levophed and epinephrine discontinued. Scheduled for TAVR on 5/29. Intubated and placed on ventilator after successful procedure due to aspiration. Failed swallow test. Remain NPO. Continuing IVF. CT head for possible stroke after concerns for dysphagia after procedure. CT head shows no new intracranial process. Re-assessment with speech.    Interval History: NAEON. Afebrile. AAO x3. Patient sitting up in bed. Says he feels good. Throat does not hurt today. Denies any HA, chest pain or pressure, SOB, hemoptysis, abdominal pain, or weakness.      Review of Systems   Constitutional: Negative for diaphoresis and fever.   HENT:  Negative for congestion and sore throat.    Eyes: Negative.    Cardiovascular:  Negative for chest pain, irregular heartbeat and palpitations.   Respiratory:  Negative for cough and shortness of breath.    Endocrine: Negative.    Hematologic/Lymphatic: Negative.    Skin:  Negative for itching and rash.   Musculoskeletal:  Negative for joint pain and myalgias.   Gastrointestinal:  Negative for abdominal pain, heartburn, nausea and vomiting.   Genitourinary:  Negative for dysuria and hematuria.   Neurological:  Negative for dizziness, headaches and light-headedness.   Psychiatric/Behavioral: Negative.       Objective:     Vital Signs (Most Recent):  Temp: 98.7 °F (37.1 °C) (06/01/25 0745)  Pulse: 71 (06/01/25 0915)  Resp: (!) 26 (06/01/25 0915)  BP: 131/60 (06/01/25 0915)  SpO2: 95 % (06/01/25 0915) Vital Signs (24h Range):  Temp:  [98.2  °F (36.8 °C)-99.2 °F (37.3 °C)] 98.7 °F (37.1 °C)  Pulse:  [54-85] 71  Resp:  [10-67] 26  SpO2:  [91 %-100 %] 95 %  BP: (129-169)/(60-92) 131/60  Arterial Line BP: (123-136)/(38-44) 129/43     Weight: 83.9 kg (185 lb)  Body mass index is 23.74 kg/m².     SpO2: 95 %         Intake/Output Summary (Last 24 hours) at 6/1/2025 0936  Last data filed at 6/1/2025 0900  Gross per 24 hour   Intake 2057.5 ml   Output 465 ml   Net 1592.5 ml       Lines/Drains/Airways       Central Venous Catheter Line  Duration             Percutaneous Central Line - Triple Lumen  05/28/25 1305 3 days              Drain  Duration             Male External Urinary Catheter 05/31/25 1215 Small <1 day              Peripheral Intravenous Line  Duration                  Peripheral IV - Single Lumen 05/28/25 1140 20 G Right Antecubital 3 days         Peripheral IV - Single Lumen 05/28/25 1230 20 G No Anterior;Left Forearm 3 days                       Physical Exam  Constitutional:       General: He is not in acute distress.     Appearance: He is not ill-appearing.   Cardiovascular:      Rate and Rhythm: Normal rate and regular rhythm.      Pulses: Normal pulses.      Heart sounds: Murmur heard.      Systolic murmur is present with a grade of 3/6.      No friction rub. No gallop.   Pulmonary:      Effort: Pulmonary effort is normal.      Breath sounds: Normal breath sounds.   Abdominal:      General: Bowel sounds are normal. There is no distension.      Tenderness: There is no abdominal tenderness.   Musculoskeletal:         General: No swelling. Normal range of motion.   Skin:     General: Skin is warm.   Neurological:      General: No focal deficit present.      Mental Status: He is alert and oriented to person, place, and time.   Psychiatric:         Mood and Affect: Mood normal.            Significant Labs: CMP   Recent Labs   Lab 05/31/25  0320 05/31/25  1116 06/01/25  0434   *  --  145   K 3.5 3.6 3.7   *  --  116*   CO2 24  --  19*      --  92   BUN 25*  --  19   CREATININE 1.0  --  0.8   CALCIUM 8.4*  --  8.3*   PROT 6.1  --  6.1   ALBUMIN 3.1*  --  3.0*   BILITOT 0.8  --  0.7   ALKPHOS 61  --  63   AST 18  --  21   ALT 10  --  11   ANIONGAP 9  --  10    and CBC   Recent Labs   Lab 05/31/25  0320 06/01/25  0434   WBC 12.53 11.90   HGB 10.1* 9.6*   HCT 30.9* 30.0*    175       Significant Imaging: reviewed  Assessment and Plan:     Assessment & Plan  Anaphylaxis  Prior to TAVR procedure, peripheral IV inserted with normal saline injected. Patient then became hypotensive, dyspneic, and diaphoretic, with rasha cross torso. Placed on NRB and given solumedrol, benadryl, and nebs. S/p omar, then started on epi gtt and levo gtt. Patient admitted to the ICU, since improved with ability to wean o2 and pressors. Unknown source, no medication administered prior to reaction. No h/o anaphylaxis, no significant allergies.   - Infectious workup ordered  - d/c'ed Epi   - Albuterol nebs scheduled  - Continue to monitor in the ICU  Aortic stenosis  H/o severe AS, presented for outpatient TAVR. Deferred due to anaphylactic reaction pre-procedure.   - TAVR on 5/29  - aspirin 125 suppository  - benadryl 25mg IV to premedicate  - f/u with interventional cardiology outpatient  Aspiration of stomach contents  - intubated and placed on mechanical ventilation  - AC/VC with TV of 500, FiO2 of 40 and PEEP of 5  - failed speech therapy  - continue fluids for hydration and calories  - repeat swallow eval  - able to be discharged home if successful swallow test  Hyperlipemia  - Continue statin  COPD (chronic obstructive pulmonary disease)  - Albuterol nebs scheduled   Essential hypertension  - Hold antihypertensives         VTE Risk Mitigation (From admission, onward)           Ordered     heparin (porcine) injection 5,000 Units  Every 8 hours         05/30/25 1133     IP VTE HIGH RISK PATIENT  Once         05/30/25 1133     Place sequential compression device   Until discontinued         05/30/25 0737     Place sequential compression device  Until discontinued         05/28/25 1747                    Judit Mancuso MD  PGY-1  Cardiology  [facility] - [facility]

## 2025-06-01 NOTE — DISCHARGE SUMMARY
[facility] - [facility] Intensive [facility]  Cardiology  Discharge Summary      Patient Name: Dutch Olivier  MRN: [id number]  Admission Date: 5/28/2025  Hospital Length of Stay: 4 days  Discharge Date and Time: 06/01/2025 2:57 PM  Attending Physician: Musa Vallecillo MD    Discharging Provider: Judit Mancuso MD  Primary Care Physician: David Arreola MD    HPI:   Dutch Olivier is a 80 yo M with a history of nonobstructive CAD, COPD, HLD, HTN, prostate ca w bone mets, and severe AS who presented for outpatient TAVR. Admitted to CCU for acute anaphylaxis pre-procedure.     Patient presented today for outpatient TAVR. Prior to the procedure, he has a peripheral IV inserted with normal saline injected. He then became acutely dyspneic, tachycardic, and diaphoretic. Diffuse rash ntoed across torso. He became unresponsive, though did not lose a pulse. He was placed on NRB. Given Cesar pushes x3, Epi gtt started. Given solumedrol, benadryl, and albuterol nebs. Concern for possible HF contributing and given Lasix. Levo was also added on prior to transfer to the ICU.     On evaluation in the ICU, patient on Epi and Levo with MAPs >65. Sating well on 6L NC. CVL and ART line placed bedside. He reports feeling well at this time, denying chest pain, dyspnea, pre-syncope. Gtts since weaned with Levo discontinued and Epi 0.1. Sating well on 3L NC.     Procedure(s) (LRB):  REPLACEMENT, AORTIC VALVE, TRANSCATHETER (TAVR) (N/A)  Cardiac Cath Cos[place]geon (N/A)  REPLACEMENT, AORTIC VALVE, TRANSCATHETER (TAVR)  PTA, Iliac Artery     Indwelling Lines/Drains at time of discharge:  Lines/Drains/Airways       Central Venous Catheter Line  Duration             Percutaneous Central Line - Triple Lumen  05/28/25 1305 4 days              Drain  Duration             Male External Urinary Catheter 05/31/25 1215 Small 1 day                    Hospital Course:  Patient admitted for anaphylaxis. Started on epinephrine and levophed. Levophed  and epinephrine discontinued. Scheduled for TAVR on 5/29. Intubated and placed on ventilator after successful procedure due to aspiration. Failed swallow test. Remain NPO. Continuing IVF. CT head for possible stroke after concerns for dysphagia after procedure. CT head shows no new intracranial process. Re-assessment with speech. Speech has cleared patient for regular diet/thin liquids given small sips, 1 sip at a time, and a slow rate of intake. Stable for discharge. F/u with interventional cardiology.    Goals of Care Treatment Preferences:         Consults:     Significant Diagnostic Studies:     Pending Diagnostic Studies:       None            Final Active Diagnoses:    Diagnosis Date Noted POA    PRINCIPAL PROBLEM:  Anaphylaxis [T78.2XXA] 05/28/2025 No    Aspiration of stomach contents [T17.918A] 05/30/2025 No    Aortic stenosis [I35.0] 10/25/2022 Yes    Essential hypertension [I10] 09/25/2012 Yes    Hyperlipemia [E78.5]  Yes    COPD (chronic obstructive pulmonary disease) [J44.9]  Yes      Problems Resolved During this Admission:     No new Assessment & Plan notes have been filed under this hospital service since the last note was generated.  Service: Cardiology      Discharged Condition: good    Disposition: Home or Self Care    Follow Up: Interventional cardiology    Patient Instructions:   No discharge procedures on file.  Medications:  Reconciled Home Medications:      Medication List        START taking these medications      clopidogreL 75 mg tablet  Commonly known as: PLAVIX  Take 1 tablet (75 mg total) by mouth once daily.  Start taking on: June 2, 2025            CONTINUE taking these medications      b complex vitamins tablet  Take 1 tablet by mouth once daily.     benazepriL 20 MG tablet  Commonly known as: LOTENSIN  Take 1 tablet (20 mg total) by mouth 2 (two) times daily.     bicalutamide 50 MG Tab  Commonly known as: CASODEX  TAKE 1 TABLET (50 MG TOTAL) BY MOUTH ONCE DAILY.      hydroCHLOROthiazide 12.5 MG Tab  Take 1 tablet (12.5 mg total) by mouth every morning.     multivitamin capsule  Take 1 capsule by mouth once daily.     pravastatin 40 MG tablet  Commonly known as: PRAVACHOL  Take 1 tablet (40 mg total) by mouth once daily.              Time spent on the discharge of patient: 35 minutes    Judit Mancuso MD  PGY-1  Cardiology  [facility] - Surgical Intensive Care

## 2025-06-01 NOTE — SUBJECTIVE & OBJECTIVE
Interval History: NAEON. Afebrile. AAO x3. Patient sitting up in bed. Says he feels good. Throat does not hurt today. Denies any HA, chest pain or pressure, SOB, hemoptysis, abdominal pain, or weakness.      Review of Systems   Constitutional: Negative for diaphoresis and fever.   HENT:  Negative for congestion and sore throat.    Eyes: Negative.    Cardiovascular:  Negative for chest pain, irregular heartbeat and palpitations.   Respiratory:  Negative for cough and shortness of breath.    Endocrine: Negative.    Hematologic/Lymphatic: Negative.    Skin:  Negative for itching and rash.   Musculoskeletal:  Negative for joint pain and myalgias.   Gastrointestinal:  Negative for abdominal pain, heartburn, nausea and vomiting.   Genitourinary:  Negative for dysuria and hematuria.   Neurological:  Negative for dizziness, headaches and light-headedness.   Psychiatric/Behavioral: Negative.       Objective:     Vital Signs (Most Recent):  Temp: 98.7 °F (37.1 °C) (06/01/25 0745)  Pulse: 71 (06/01/25 0915)  Resp: (!) 26 (06/01/25 0915)  BP: 131/60 (06/01/25 0915)  SpO2: 95 % (06/01/25 0915) Vital Signs (24h Range):  Temp:  [98.2 °F (36.8 °C)-99.2 °F (37.3 °C)] 98.7 °F (37.1 °C)  Pulse:  [54-85] 71  Resp:  [10-67] 26  SpO2:  [91 %-100 %] 95 %  BP: (129-169)/(60-92) 131/60  Arterial Line BP: (123-136)/(38-44) 129/43     Weight: 83.9 kg (185 lb)  Body mass index is 23.74 kg/m².     SpO2: 95 %         Intake/Output Summary (Last 24 hours) at 6/1/2025 0936  Last data filed at 6/1/2025 0900  Gross per 24 hour   Intake 2057.5 ml   Output 465 ml   Net 1592.5 ml       Lines/Drains/Airways       Central Venous Catheter Line  Duration             Percutaneous Central Line - Triple Lumen  05/28/25 1305 3 days              Drain  Duration             Male External Urinary Catheter 05/31/25 1215 Small <1 day              Peripheral Intravenous Line  Duration                  Peripheral IV - Single Lumen 05/28/25 1140 20 G Right Antecubital  3 days         Peripheral IV - Single Lumen 05/28/25 1230 20 G No Anterior;Left Forearm 3 days                       Physical Exam  Constitutional:       General: He is not in acute distress.     Appearance: He is not ill-appearing.   Cardiovascular:      Rate and Rhythm: Normal rate and regular rhythm.      Pulses: Normal pulses.      Heart sounds: Murmur heard.      Systolic murmur is present with a grade of 3/6.      No friction rub. No gallop.   Pulmonary:      Effort: Pulmonary effort is normal.      Breath sounds: Normal breath sounds.   Abdominal:      General: Bowel sounds are normal. There is no distension.      Tenderness: There is no abdominal tenderness.   Musculoskeletal:         General: No swelling. Normal range of motion.   Skin:     General: Skin is warm.   Neurological:      General: No focal deficit present.      Mental Status: He is alert and oriented to person, place, and time.   Psychiatric:         Mood and Affect: Mood normal.            Significant Labs: CMP   Recent Labs   Lab 05/31/25  0320 05/31/25  1116 06/01/25  0434   *  --  145   K 3.5 3.6 3.7   *  --  116*   CO2 24  --  19*     --  92   BUN 25*  --  19   CREATININE 1.0  --  0.8   CALCIUM 8.4*  --  8.3*   PROT 6.1  --  6.1   ALBUMIN 3.1*  --  3.0*   BILITOT 0.8  --  0.7   ALKPHOS 61  --  63   AST 18  --  21   ALT 10  --  11   ANIONGAP 9  --  10    and CBC   Recent Labs   Lab 05/31/25  0320 06/01/25  0434   WBC 12.53 11.90   HGB 10.1* 9.6*   HCT 30.9* 30.0*    175       Significant Imaging: reviewed

## 2025-06-01 NOTE — ASSESSMENT & PLAN NOTE
- intubated and placed on mechanical ventilation  - AC/VC with TV of 500, FiO2 of 40 and PEEP of 5  - failed speech therapy  - continue fluids for hydration and calories  - repeat swallow eval  - able to be discharged home if successful swallow test

## 2025-06-01 NOTE — ASSESSMENT & PLAN NOTE
H/o severe AS, presented for outpatient TAVR. Deferred due to anaphylactic reaction pre-procedure.   - TAVR on 5/29  - aspirin 125 suppository  - benadryl 25mg IV to premedicate  - f/u with interventional cardiology outpatient

## 2025-06-01 NOTE — EICU
Intervention Initiated From:  [facility] / EICU    Holden intervened regarding:  Rounding (Video assessment)    Virtual ICU Quality Rounds    Admit Date: 5/28/2025  Hospital Day: 4    ICU Day: 4d 2h    24H Vital Sign Range:  Temp:  [98.3 °F (36.8 °C)-99.2 °F (37.3 °C)]   Pulse:  [54-85]   Resp:  [10-67]   BP: (127-169)/(60-92)   SpO2:  [67 %-100 %]     VICU Surveillance Screening    Daily review of  line necessity(optional): Central line(s) in place    Central line type (optional): Triple lumen catheter            LDA reconciliation : Yes

## 2025-06-01 NOTE — DISCHARGE INSTRUCTIONS
Please eat small meals with frequent chewing to prevent any choking or aspiration. Ease into eating larger and/or more difficult meals.

## 2025-06-01 NOTE — NURSING
"SICU PLAN OF CARE NOTE    Dx: Anaphylaxis    Goals of Care:  MAP >65    Vital Signs:  BP (!) 164/68   Pulse 68   Temp 99 °F (37.2 °C)   Resp (!) 24   Ht 6' 2.02" (1.88 m)   Wt 83.9 kg (185 lb)   SpO2 (!) 94%   BMI 23.74 kg/m²     Cardiac:  NSR, SB    Resp:  SpO2 94% on room air     Neuro:  AAO x4, Follows Commands, and Moves All Extremities    Gtts:  n/a    Urine Output:  external Urinary Catheter 900 cc/shift    Drains:  n/a    Diet:  Regular Diet with aspiration precautions       Labs/Accuchecks:  AC/HS, daily CBC, CMP, Mg,    Skin:  All skin remains free from further injury.  Patient turned or repositioned self at least every Q2H, waffle mattress inflated and ICU bed working correctly.    Shift Events: Entry error (wrong patient) - Plan to hold over night to assess while eating regular diet.  See flowsheet for further assessment/details.  Saulo () updated on current condition/plan of care, questions answered, and emotional support provided.  MD updated on current condition, vitals, labs, and gtts.    "

## 2025-06-01 NOTE — PLAN OF CARE
"SICU PLAN OF CARE NOTE    Dx: Anaphylaxis    Goals of Care: Strict NPO per SLP consult, SBP >100    Vital Signs: BP (!) 141/64   Pulse (!) 58   Temp 98.3 °F (36.8 °C) (Oral)   Resp (!) 25   Ht 6' 2.02" (1.88 m)   Wt 83.9 kg (185 lb)   SpO2 95%   BMI 23.74 kg/m²     Cardiac: NSR and sinus kian       Resp: SpO2 95% on RA    Neuro: AAO x4, Follows Commands, and Moves All Extremities    Gtts: NS @ 100cc/hr     Urine Output: Voids Spontaneously via external condom catheter 340 cc/shift    Diet: NPO     Labs/Accuchecks: all labs trended reviewed and replaced as needed.     Skin:  skin assessed for redness and breakdown during shift, heels and sacrum CDI with foam dressings in place, pt moves all extremities freely and independently, weight shift assistance provided as needed. Waffle mattress in place, bed plugged in and working properly.     Shift Events: VSS during shift, MD notified of nausea and dry heaving at the beginning of shift, zofran given IV, mild relief obtained, pt continued to have upper GI belching and indigestion s/s, protonix IV given as ordered.   See flowsheet for further assessment/details. Pt daughters at bedside and updated on current condition/plan of care, questions answered, and emotional support provided.   MD updated on current condition, vitals, labs, and gtts.  No new orders received, will continue to monitor.     "

## 2025-06-02 PROBLEM — J96.01 ACUTE HYPOXIC RESPIRATORY FAILURE: Status: ACTIVE | Noted: 2025-06-02

## 2025-06-02 PROBLEM — F17.200 TOBACCO DEPENDENCY: Status: ACTIVE | Noted: 2025-06-02

## 2025-06-02 LAB
ABSOLUTE EOSINOPHIL (OHS): 0.2 K/UL
ABSOLUTE MONOCYTE (OHS): 1.09 K/UL (ref 0.3–1)
ABSOLUTE NEUTROPHIL COUNT (OHS): 7.73 K/UL (ref 1.8–7.7)
ALBUMIN SERPL BCP-MCNC: 2.7 G/DL (ref 3.5–5.2)
ALLENS TEST: ABNORMAL
ALP SERPL-CCNC: 61 UNIT/L (ref 40–150)
ALT SERPL W/O P-5'-P-CCNC: 12 UNIT/L (ref 10–44)
ANION GAP (OHS): 9 MMOL/L (ref 8–16)
AST SERPL-CCNC: 18 UNIT/L (ref 11–45)
BACTERIA BLD CULT: NORMAL
BACTERIA BLD CULT: NORMAL
BASOPHILS # BLD AUTO: 0.05 K/UL
BASOPHILS NFR BLD AUTO: 0.4 %
BILIRUB SERPL-MCNC: 0.5 MG/DL (ref 0.1–1)
BUN SERPL-MCNC: 20 MG/DL (ref 8–23)
CALCIUM SERPL-MCNC: 8.2 MG/DL (ref 8.7–10.5)
CHLORIDE SERPL-SCNC: 114 MMOL/L (ref 95–110)
CO2 SERPL-SCNC: 21 MMOL/L (ref 23–29)
CREAT SERPL-MCNC: 0.9 MG/DL (ref 0.5–1.4)
DELSYS: ABNORMAL
ERYTHROCYTE [DISTWIDTH] IN BLOOD BY AUTOMATED COUNT: 13.2 % (ref 11.5–14.5)
GFR SERPLBLD CREATININE-BSD FMLA CKD-EPI: >60 ML/MIN/1.73/M2
GLUCOSE SERPL-MCNC: 115 MG/DL (ref 70–110)
HCO3 UR-SCNC: 19.8 MMOL/L (ref 24–28)
HCT VFR BLD AUTO: 27.8 % (ref 40–54)
HCT VFR BLD CALC: 28 %PCV (ref 36–54)
HGB BLD-MCNC: 8.9 GM/DL (ref 14–18)
IMM GRANULOCYTES # BLD AUTO: 0.05 K/UL (ref 0–0.04)
IMM GRANULOCYTES NFR BLD AUTO: 0.4 % (ref 0–0.5)
LYMPHOCYTES # BLD AUTO: 2.09 K/UL (ref 1–4.8)
MAGNESIUM SERPL-MCNC: 2.1 MG/DL (ref 1.6–2.6)
MCH RBC QN AUTO: 29.6 PG (ref 27–31)
MCHC RBC AUTO-ENTMCNC: 32 G/DL (ref 32–36)
MCV RBC AUTO: 92 FL (ref 82–98)
NUCLEATED RBC (/100WBC) (OHS): 0 /100 WBC
PCO2 BLDA: 27.2 MMHG (ref 35–45)
PH SMN: 7.47 [PH] (ref 7.35–7.45)
PHOSPHATE SERPL-MCNC: 2.7 MG/DL (ref 2.7–4.5)
PLATELET # BLD AUTO: 177 K/UL (ref 150–450)
PMV BLD AUTO: 10.1 FL (ref 9.2–12.9)
PO2 BLDA: 38 MMHG (ref 80–100)
POC BE: -4 MMOL/L (ref -2–2)
POC IONIZED CALCIUM: 1.22 MMOL/L (ref 1.06–1.42)
POC SATURATED O2: 77 % (ref 95–100)
POC TCO2: 21 MMOL/L (ref 23–27)
POCT GLUCOSE: 114 MG/DL (ref 70–110)
POCT GLUCOSE: 134 MG/DL (ref 70–110)
POTASSIUM BLD-SCNC: 3.8 MMOL/L (ref 3.5–5.1)
POTASSIUM SERPL-SCNC: 3.8 MMOL/L (ref 3.5–5.1)
PROT SERPL-MCNC: 5.8 GM/DL (ref 6–8.4)
RBC # BLD AUTO: 3.01 M/UL (ref 4.6–6.2)
RELATIVE EOSINOPHIL (OHS): 1.8 %
RELATIVE LYMPHOCYTE (OHS): 18.6 % (ref 18–48)
RELATIVE MONOCYTE (OHS): 9.7 % (ref 4–15)
RELATIVE NEUTROPHIL (OHS): 69.1 % (ref 38–73)
SAMPLE: ABNORMAL
SITE: ABNORMAL
SODIUM BLD-SCNC: 143 MMOL/L (ref 136–145)
SODIUM SERPL-SCNC: 144 MMOL/L (ref 136–145)
WBC # BLD AUTO: 11.21 K/UL (ref 3.9–12.7)

## 2025-06-02 PROCEDURE — 92526 ORAL FUNCTION THERAPY: CPT

## 2025-06-02 PROCEDURE — 63600175 PHARM REV CODE 636 W HCPCS: Performed by: STUDENT IN AN ORGANIZED HEALTH CARE EDUCATION/TRAINING PROGRAM

## 2025-06-02 PROCEDURE — 84100 ASSAY OF PHOSPHORUS: CPT | Performed by: INTERNAL MEDICINE

## 2025-06-02 PROCEDURE — 5A0935A ASSISTANCE WITH RESPIRATORY VENTILATION, LESS THAN 24 CONSECUTIVE HOURS, HIGH NASAL FLOW/VELOCITY: ICD-10-PCS | Performed by: INTERNAL MEDICINE

## 2025-06-02 PROCEDURE — 20600001 HC STEP DOWN PRIVATE ROOM

## 2025-06-02 PROCEDURE — 25000003 PHARM REV CODE 250: Performed by: INTERNAL MEDICINE

## 2025-06-02 PROCEDURE — 25000242 PHARM REV CODE 250 ALT 637 W/ HCPCS

## 2025-06-02 PROCEDURE — 99900035 HC TECH TIME PER 15 MIN (STAT)

## 2025-06-02 PROCEDURE — 63600175 PHARM REV CODE 636 W HCPCS: Performed by: INTERNAL MEDICINE

## 2025-06-02 PROCEDURE — 97535 SELF CARE MNGMENT TRAINING: CPT

## 2025-06-02 PROCEDURE — 83735 ASSAY OF MAGNESIUM: CPT

## 2025-06-02 PROCEDURE — 25000003 PHARM REV CODE 250: Performed by: STUDENT IN AN ORGANIZED HEALTH CARE EDUCATION/TRAINING PROGRAM

## 2025-06-02 PROCEDURE — 82310 ASSAY OF CALCIUM: CPT

## 2025-06-02 PROCEDURE — 94761 N-INVAS EAR/PLS OXIMETRY MLT: CPT

## 2025-06-02 PROCEDURE — 27100171 HC OXYGEN HIGH FLOW UP TO 24 HOURS

## 2025-06-02 PROCEDURE — 63600175 PHARM REV CODE 636 W HCPCS

## 2025-06-02 PROCEDURE — 85025 COMPLETE CBC W/AUTO DIFF WBC: CPT

## 2025-06-02 PROCEDURE — 94799 UNLISTED PULMONARY SVC/PX: CPT

## 2025-06-02 PROCEDURE — 94640 AIRWAY INHALATION TREATMENT: CPT

## 2025-06-02 RX ORDER — HYDRALAZINE HYDROCHLORIDE 20 MG/ML
5 INJECTION INTRAMUSCULAR; INTRAVENOUS ONCE
Status: COMPLETED | OUTPATIENT
Start: 2025-06-02 | End: 2025-06-02

## 2025-06-02 RX ORDER — POTASSIUM CHLORIDE 750 MG/1
30 CAPSULE, EXTENDED RELEASE ORAL ONCE
Status: COMPLETED | OUTPATIENT
Start: 2025-06-02 | End: 2025-06-02

## 2025-06-02 RX ADMIN — AMPICILLIN SODIUM AND SULBACTAM SODIUM 3 G: 2; 1 INJECTION, POWDER, FOR SOLUTION INTRAMUSCULAR; INTRAVENOUS at 11:06

## 2025-06-02 RX ADMIN — AMPICILLIN SODIUM AND SULBACTAM SODIUM 3 G: 2; 1 INJECTION, POWDER, FOR SOLUTION INTRAMUSCULAR; INTRAVENOUS at 05:06

## 2025-06-02 RX ADMIN — PANTOPRAZOLE SODIUM 40 MG: 40 INJECTION, POWDER, LYOPHILIZED, FOR SOLUTION INTRAVENOUS at 08:06

## 2025-06-02 RX ADMIN — IPRATROPIUM BROMIDE AND ALBUTEROL SULFATE 3 ML: 2.5; .5 SOLUTION RESPIRATORY (INHALATION) at 11:06

## 2025-06-02 RX ADMIN — MUPIROCIN: 20 OINTMENT TOPICAL at 09:06

## 2025-06-02 RX ADMIN — IPRATROPIUM BROMIDE AND ALBUTEROL SULFATE 3 ML: 2.5; .5 SOLUTION RESPIRATORY (INHALATION) at 08:06

## 2025-06-02 RX ADMIN — HYDRALAZINE HYDROCHLORIDE 5 MG: 20 INJECTION, SOLUTION INTRAMUSCULAR; INTRAVENOUS at 09:06

## 2025-06-02 RX ADMIN — POTASSIUM CHLORIDE 30 MEQ: 750 CAPSULE, EXTENDED RELEASE ORAL at 06:06

## 2025-06-02 RX ADMIN — IPRATROPIUM BROMIDE AND ALBUTEROL SULFATE 3 ML: 2.5; .5 SOLUTION RESPIRATORY (INHALATION) at 03:06

## 2025-06-02 RX ADMIN — HEPARIN SODIUM 5000 UNITS: 5000 INJECTION INTRAVENOUS; SUBCUTANEOUS at 05:06

## 2025-06-02 RX ADMIN — AMPICILLIN SODIUM AND SULBACTAM SODIUM 3 G: 2; 1 INJECTION, POWDER, FOR SOLUTION INTRAMUSCULAR; INTRAVENOUS at 06:06

## 2025-06-02 RX ADMIN — HEPARIN SODIUM 5000 UNITS: 5000 INJECTION INTRAVENOUS; SUBCUTANEOUS at 09:06

## 2025-06-02 RX ADMIN — HEPARIN SODIUM 5000 UNITS: 5000 INJECTION INTRAVENOUS; SUBCUTANEOUS at 02:06

## 2025-06-02 NOTE — ASSESSMENT & PLAN NOTE
Patient with Hypoxic Respiratory failure which is Acute.  he is not on home oxygen. Supplemental oxygen was provided and noted- Oxygen Concentration (%):  [40-90] 40    .   Signs/symptoms of respiratory failure include- respiratory distress. Contributing diagnoses includes - Aspiration Labs and images were reviewed. Patient Has recent ABG, which has been reviewed.     Plan:  -- HF oxygen, now back on O2 via NC  -- Continue current abx  -- Monitor vitals

## 2025-06-02 NOTE — PT/OT/SLP PROGRESS
Physical Therapy      Patient Name:  Dutch Olivier   MRN:  [id number]    Patient not seen today. Medical hold. Pt with possible aspiration even requiring increased O2 on airvo.  Will follow-up when medically appropriate.

## 2025-06-02 NOTE — NURSING
SICU PLAN OF CARE    Dx: Anaphylaxis    Goals of Care: -180    Vital Signs (last 12 hours):   Temp:  [99 °F (37.2 °C)-100.3 °F (37.9 °C)]   Pulse:  [53-76]   Resp:  [18-43]   BP: (126-164)/(58-74)   SpO2:  [90 %-95 %]      Neuro: AAOx4, Follows commands , and Moves all extremities spontaneously     Cardiac: Rhythm: sinus bradycardia    Respiratory: Room Air    Urine Output: External Catheter  550 mL/shift    Diet: Regular     Labs/Accuchecks: Accuchecks ACHS, daily labs    Skin:  See flowsheets for details, no new skin breakdown present.  Patient turned q2h, bony prominences protected, and mattress inflated/working correctly.     Shift Events:  Pt. Tolerated PO intake with dinner well.  See flowsheet for further assessment/details.  Family updated on current condition/plan of care, questions answered, and emotional support provided.  MD updated on current condition, vitals, labs, and gtts.

## 2025-06-02 NOTE — PT/OT/SLP PROGRESS
Occupational Therapy      Patient Name:  Dutch Olivier   MRN:  [id number]    Patient not seen today secondary to on hold per RN 2* possible aspiration event and increasing O2 requirements.  . Will follow-up 6/3.    6/2/2025

## 2025-06-02 NOTE — EICU
Virtual ICU Quality Rounds    Admit Date: 5/28/2025  Hospital Day: 5    ICU Day: 4d 21h    24H Vital Sign Range:  Temp:  [98.7 °F (37.1 °C)-100.3 °F (37.9 °C)]   Pulse:  [53-88]   Resp:  [15-43]   BP: ()/()   SpO2:  [67 %-97 %]     VICU Surveillance Screening  Daily review of  line necessity(optional): Central line(s) in place  Central line type (optional): Triple lumen catheter  LDA reconciliation : Yes  Central line best practices : Consider removal if patient has no central line indications for over 24hrs

## 2025-06-02 NOTE — PROGRESS NOTES
[facility]  Cardiology  Progress Note    Patient Name: Dutch Olivier  MRN: [id number]  Admission Date: 5/28/2025  Hospital Length of Stay: 5 days  Code Status: No Order   Attending Physician: Musa Vallecillo MD   Primary Care Physician: David Arreola MD  Expected Discharge Date: 6/6/2025  Principal Problem:Anaphylaxis    Subjective:     Hospital Course:   Patient admitted for anaphylaxis. Started on epinephrine and levophed. Levophed and epinephrine discontinued. Scheduled for TAVR on 5/29. Intubated and placed on ventilator after successful procedure due to aspiration. Failed swallow test. Remain NPO. Continuing IVF. CT head for possible stroke after concerns for dysphagia after procedure. CT head shows no new intracranial process. Re-assessment with speech. Speech has cleared patient for regular diet/thin liquids given small sips, 1 sip at a time, and a slow rate of intake. Patient had acute hypoxic respiratory failure this morning that improved with HF oxygen, back on O2 via NC. F/u with interventional cardiology when discharge.    Interval History: ASUH. Had acute hypoxic respiratory failure this morning, which improved with high-flow oxygen. Now back on oxygen via nasal cannula, saturating at 100%. He is afebrile and bradycardic (HR 50-57). Continue oxygen via NC and antibiotics. Consider stepping down to the floor in the morning      ROS  Objective:     Vital Signs (Most Recent):  Temp: 99.5 °F (37.5 °C) (06/02/25 1100)  Pulse: (!) 55 (06/02/25 1500)  Resp: (!) 22 (06/02/25 1500)  BP: (!) 148/64 (06/02/25 1500)  SpO2: 100 % (06/02/25 1500) Vital Signs (24h Range):  Temp:  [98.7 °F (37.1 °C)-100.3 °F (37.9 °C)] 99.5 °F (37.5 °C)  Pulse:  [53-94] 55  Resp:  [13-44] 22  SpO2:  [80 %-100 %] 100 %  BP: ()/() 148/64     Weight: 83.9 kg (185 lb)  Body mass index is 23.74 kg/m².     SpO2: 100 %         Intake/Output Summary (Last 24 hours) at 6/2/2025 1524  Last  data filed at 6/2/2025 1300  Gross per 24 hour   Intake 283.63 ml   Output 1150 ml   Net -866.37 ml       Lines/Drains/Airways       Central Venous Catheter Line  Duration             Percutaneous Central Line - Triple Lumen  05/28/25 1305 5 days              Drain  Duration             Male External Urinary Catheter 06/01/25 1800 Small <1 day                       Physical Exam  Vitals and nursing note reviewed.   Constitutional:       Appearance: Normal appearance.   HENT:      Head: Normocephalic and atraumatic.      Nose: No congestion or rhinorrhea.   Eyes:      General:         Right eye: No discharge.         Left eye: No discharge.   Cardiovascular:      Rate and Rhythm: Regular rhythm. Bradycardia present.   Pulmonary:      Effort: Respiratory distress present.      Breath sounds: No wheezing.   Abdominal:      General: There is no distension.      Tenderness: There is no abdominal tenderness.   Musculoskeletal:      Right lower leg: No edema.      Left lower leg: No edema.   Neurological:      Mental Status: He is alert and oriented to person, place, and time.   Psychiatric:         Mood and Affect: Mood normal.         Behavior: Behavior normal.            Significant Labs: All pertinent lab results from the last 24 hours have been reviewed.    Significant Imaging: Echocardiogram: Transthoracic echo (TTE) complete (Cupid Only):   Results for orders placed or performed during the hospital encounter of 04/22/25   Echo   Result Value Ref Range    BSA 2.09 m2    LVOT stroke volume 104.5 cm3    LVIDd 5.3 3.5 - 6.0 cm    LV Systolic Volume 67 mL    LV Systolic Volume Index 31.9 mL/m2    LVIDs 3.9 2.1 - 4.0 cm    LV ESV A2C 60.27 mL    LV Diastolic Volume 133 mL    LV ESV A4C 44.40 mL    LV Diastolic Volume Index 63.33 mL/m2    Left Ventricular End Systolic Volume by Teichholz Method 66.66 mL    Left Ventricular End Diastolic Volume by Teichholz Method 133.35 mL    IVS 1.0 0.6 - 1.1 cm    LVOT diameter 2.6 cm     LVOT area 5.3 cm2    FS 26.4 (A) 28 - 44 %    Left Ventricle Relative Wall Thickness 0.34 cm    PW 0.9 0.6 - 1.1 cm    LV mass 187.3 g    LV Mass Index 89.2 g/m2    MV Peak E Maxime 0.65 m/s    TDI LATERAL 0.08 m/s    TDI SEPTAL 0.06 m/s    E/E' ratio 9 m/s    MV Peak A Maxime 0.70 m/s    TR Max Maxime 2.8 m/s    E/A ratio 0.93     IVRT 111 msec    E wave deceleration time 236 msec    LV SEPTAL E/E' RATIO 10.8 m/s    LV LATERAL E/E' RATIO 8.1 m/s    PV Peak S Maxime 0.80 m/s    PV Peak D Maxime 0.59 m/s    Pulm vein S/D ratio 1.36     LVOT peak maxime 0.7 m/s    Left Ventricular Outflow Tract Mean Velocity 0.48 cm/s    Left Ventricular Outflow Tract Mean Gradient 1.04 mmHg    RV- cuello basal diam 3.7 cm    RV-cuello mid d 2.6 cm    RV Basal Diameter 6.52 cm    RV-cuello length 6.5 cm    RV mid diameter 2.56 cm    RV S' 13.64 cm/s    TAPSE 2.29 cm    RV/LV Ratio 0.70 cm    LA size 4.3 cm    LA Vol (MOD) 53 mL    RUBEN (MOD) 25 mL/m2    AV regurgitation pressure 1/2 time 678 ms    AR Max Maxime 4.09 m/s    AV mean gradient 44 mmHg    AV peak gradient 71 mmHg    Ao peak maxime 4.2 m/s    Ao .5 cm    LVOT peak VTI 19.7 cm    AV valve area 0.9 cm²    AV Velocity Ratio 0.17     AV index (prosthetic) 0.17     KAMRAN by Velocity Ratio 0.9 cm²    MV stenosis pressure 1/2 time 68.50 ms    MV valve area p 1/2 method 3.21 cm2    Triscuspid Valve Regurgitation Peak Gradient 32 mmHg    Sinus 3.21 cm    STJ 2.76 cm    Ascending aorta 3.27 cm    Mean e' 0.07 m/s    ZLVIDS -0.18     ZLVIDD -2.08     LA area A4C 18.64 cm2    LA area A2C 21.58 cm2    RVDD 3.69 cm    TV resting pulmonary artery pressure 34 mmHg    RV TB RVSP 6 mmHg    Est. RA pres 3 mmHg    Narrative      Left Ventricle: The left ventricle is normal in size. Normal wall   thickness. There is normal systolic function with a visually estimated   ejection fraction of 60 - 65%. There is normal diastolic function.    Right Ventricle: The right ventricle is normal in size. Wall thickness   is normal.  Systolic function is normal.    Aortic Valve: The aortic valve is a trileaflet valve. There is moderate   to severe aortic valve sclerosis. Severely restricted motion. There is   severe stenosis. Aortic valve area by VTI is 0.9 cm². Aortic valve peak   velocity is 4.2 m/s. Mean gradient is 44 mmHg. The dimensionless index is   0.17.    Pulmonary Artery: There is borderline elevated pulmonary hypertension.   The estimated pulmonary artery systolic pressure is 34 mmHg.    IVC/SVC: Normal venous pressure at 3 mmHg.       Assessment and Plan:     Brief HPI: Anaphylaxis     * Anaphylaxis  Prior to TAVR procedure, peripheral IV inserted with normal saline injected. Patient then became hypotensive, dyspneic, and diaphoretic, with rasha cross torso. Placed on NRB and given solumedrol, benadryl, and nebs. S/p omar, then started on epi gtt and levo gtt. Patient admitted to the ICU, since improved with ability to wean o2 and pressors. Unknown source, no medication administered prior to reaction. No h/o anaphylaxis, no significant allergies.   - Infectious workup ordered  - Continue Epi, wean as tolerated  - Albuterol nebs scheduled  - Continue to monitor in the ICU    Acute hypoxic respiratory failure  Patient with Hypoxic Respiratory failure which is Acute.  he is not on home oxygen. Supplemental oxygen was provided and noted- Oxygen Concentration (%):  [40-90] 40    .   Signs/symptoms of respiratory failure include- respiratory distress. Contributing diagnoses includes - Aspiration Labs and images were reviewed. Patient Has recent ABG, which has been reviewed.     Plan:  -- HF oxygen, now back on O2 via NC  -- Continue current abx  -- Monitor vitals    Aspiration of stomach contents  - intubated and placed on mechanical ventilation  - AC/VC with TV of 500, FiO2 of 40 and PEEP of 5  - failed speech therapy  - continue fluids for hydration and calories  - repeat swallow eval  - Swallow test not performed today d/o his acute  hypoxic respiratory failure which is now improved  - Swallow test lio    Aortic stenosis  H/o severe AS, presented for outpatient TAVR. Deferred due to anaphylactic reaction pre-procedure.   - NPO MN for possible TAVR tomorrow, discuss with IC in the morning     Essential hypertension  - Hold antihypertensives     COPD (chronic obstructive pulmonary disease)  - Albuterol nebs scheduled     Hyperlipemia  - Continue statin          VTE Risk Mitigation (From admission, onward)           Ordered     heparin (porcine) injection 5,000 Units  Every 8 hours         05/30/25 1133     IP VTE HIGH RISK PATIENT  Once         05/30/25 1133     Place sequential compression device  Until discontinued         05/30/25 0737     Place sequential compression device  Until discontinued         05/28/25 2357                    Remi Candelaria MD  Cardiology  [facility]

## 2025-06-02 NOTE — NURSING
Patient desatting low 80s with increased WOB and SOB during breathing treatment, leaning to left side. MD Braulio notified and at bedside, RN x2 and RRT at bedside, ABG and chest xray ordered, nonrebreather applied with little improvement, O2 sats 78-mid 80s. HFNC applied with non-rebreather, O2 sat improving. Airvo applied, patient tolerating well, O2 sats high 90s. Patient clearing secretions and coughing up mucous.

## 2025-06-02 NOTE — ASSESSMENT & PLAN NOTE
- intubated and placed on mechanical ventilation  - AC/VC with TV of 500, FiO2 of 40 and PEEP of 5  - failed speech therapy  - continue fluids for hydration and calories  - repeat swallow eval  - Swallow test not performed today d/o his acute hypoxic respiratory failure which is now improved  - Swallow test lio

## 2025-06-02 NOTE — SUBJECTIVE & OBJECTIVE
Interval History: ASHU. Had acute hypoxic respiratory failure this morning, which improved with high-flow oxygen. Now back on oxygen via nasal cannula, saturating at 100%. He is afebrile and bradycardic (HR 50-57). Continue oxygen via NC and antibiotics. Consider stepping down to the floor in the morning      ROS  Objective:     Vital Signs (Most Recent):  Temp: 99.5 °F (37.5 °C) (06/02/25 1100)  Pulse: (!) 55 (06/02/25 1500)  Resp: (!) 22 (06/02/25 1500)  BP: (!) 148/64 (06/02/25 1500)  SpO2: 100 % (06/02/25 1500) Vital Signs (24h Range):  Temp:  [98.7 °F (37.1 °C)-100.3 °F (37.9 °C)] 99.5 °F (37.5 °C)  Pulse:  [53-94] 55  Resp:  [13-44] 22  SpO2:  [80 %-100 %] 100 %  BP: ()/() 148/64     Weight: 83.9 kg (185 lb)  Body mass index is 23.74 kg/m².     SpO2: 100 %         Intake/Output Summary (Last 24 hours) at 6/2/2025 1527  Last data filed at 6/2/2025 1300  Gross per 24 hour   Intake 283.63 ml   Output 1150 ml   Net -866.37 ml       Lines/Drains/Airways       Central Venous Catheter Line  Duration             Percutaneous Central Line - Triple Lumen  05/28/25 1305 5 days              Drain  Duration             Male External Urinary Catheter 06/01/25 1800 Small <1 day                       Physical Exam  Vitals and nursing note reviewed.   Constitutional:       Appearance: Normal appearance.   HENT:      Head: Normocephalic and atraumatic.      Nose: No congestion or rhinorrhea.   Eyes:      General:         Right eye: No discharge.         Left eye: No discharge.   Cardiovascular:      Rate and Rhythm: Regular rhythm. Bradycardia present.   Pulmonary:      Effort: Respiratory distress present.      Breath sounds: No wheezing.   Abdominal:      General: There is no distension.      Tenderness: There is no abdominal tenderness.   Musculoskeletal:      Right lower leg: No edema.      Left lower leg: No edema.   Neurological:      Mental Status: He is alert and oriented to person, place, and time.    Psychiatric:         Mood and Affect: Mood normal.         Behavior: Behavior normal.            Significant Labs: All pertinent lab results from the last 24 hours have been reviewed.    Significant Imaging: Echocardiogram: Transthoracic echo (TTE) complete (Cupid Only):   Results for orders placed or performed during the hospital encounter of 04/22/25   Echo   Result Value Ref Range    BSA 2.09 m2    LVOT stroke volume 104.5 cm3    LVIDd 5.3 3.5 - 6.0 cm    LV Systolic Volume 67 mL    LV Systolic Volume Index 31.9 mL/m2    LVIDs 3.9 2.1 - 4.0 cm    LV ESV A2C 60.27 mL    LV Diastolic Volume 133 mL    LV ESV A4C 44.40 mL    LV Diastolic Volume Index 63.33 mL/m2    Left Ventricular End Systolic Volume by Teichholz Method 66.66 mL    Left Ventricular End Diastolic Volume by Teichholz Method 133.35 mL    IVS 1.0 0.6 - 1.1 cm    LVOT diameter 2.6 cm    LVOT area 5.3 cm2    FS 26.4 (A) 28 - 44 %    Left Ventricle Relative Wall Thickness 0.34 cm    PW 0.9 0.6 - 1.1 cm    LV mass 187.3 g    LV Mass Index 89.2 g/m2    MV Peak E Maxime 0.65 m/s    TDI LATERAL 0.08 m/s    TDI SEPTAL 0.06 m/s    E/E' ratio 9 m/s    MV Peak A Maxime 0.70 m/s    TR Max Maxime 2.8 m/s    E/A ratio 0.93     IVRT 111 msec    E wave deceleration time 236 msec    LV SEPTAL E/E' RATIO 10.8 m/s    LV LATERAL E/E' RATIO 8.1 m/s    PV Peak S Maxime 0.80 m/s    PV Peak D Maxime 0.59 m/s    Pulm vein S/D ratio 1.36     LVOT peak maxime 0.7 m/s    Left Ventricular Outflow Tract Mean Velocity 0.48 cm/s    Left Ventricular Outflow Tract Mean Gradient 1.04 mmHg    RV- cuello basal diam 3.7 cm    RV-cuello mid d 2.6 cm    RV Basal Diameter 6.52 cm    RV-cuello length 6.5 cm    RV mid diameter 2.56 cm    RV S' 13.64 cm/s    TAPSE 2.29 cm    RV/LV Ratio 0.70 cm    LA size 4.3 cm    LA Vol (MOD) 53 mL    RUBEN (MOD) 25 mL/m2    AV regurgitation pressure 1/2 time 678 ms    AR Max Maxime 4.09 m/s    AV mean gradient 44 mmHg    AV peak gradient 71 mmHg    Ao peak maxime 4.2 m/s    Ao .5  cm    LVOT peak VTI 19.7 cm    AV valve area 0.9 cm²    AV Velocity Ratio 0.17     AV index (prosthetic) 0.17     KAMRAN by Velocity Ratio 0.9 cm²    MV stenosis pressure 1/2 time 68.50 ms    MV valve area p 1/2 method 3.21 cm2    Triscuspid Valve Regurgitation Peak Gradient 32 mmHg    Sinus 3.21 cm    STJ 2.76 cm    Ascending aorta 3.27 cm    Mean e' 0.07 m/s    ZLVIDS -0.18     ZLVIDD -2.08     LA area A4C 18.64 cm2    LA area A2C 21.58 cm2    RVDD 3.69 cm    TV resting pulmonary artery pressure 34 mmHg    RV TB RVSP 6 mmHg    Est. RA pres 3 mmHg    Narrative      Left Ventricle: The left ventricle is normal in size. Normal wall   thickness. There is normal systolic function with a visually estimated   ejection fraction of 60 - 65%. There is normal diastolic function.    Right Ventricle: The right ventricle is normal in size. Wall thickness   is normal. Systolic function is normal.    Aortic Valve: The aortic valve is a trileaflet valve. There is moderate   to severe aortic valve sclerosis. Severely restricted motion. There is   severe stenosis. Aortic valve area by VTI is 0.9 cm². Aortic valve peak   velocity is 4.2 m/s. Mean gradient is 44 mmHg. The dimensionless index is   0.17.    Pulmonary Artery: There is borderline elevated pulmonary hypertension.   The estimated pulmonary artery systolic pressure is 34 mmHg.    IVC/SVC: Normal venous pressure at 3 mmHg.

## 2025-06-02 NOTE — PT/OT/SLP PROGRESS
"Speech Language Pathology Treatment    Patient Name:  Dutch Olivier   MRN:  [id number]  Admitting Diagnosis: Anaphylaxis    Recommendations:                 General Recommendations:  Dysphagia therapy  Diet recommendations:  NPO, Liquid Diet Level: NPO   Aspiration Precautions: ice chips for pleasure , non oral medications   General Precautions: Standard, aspiration, fall  Communication strategies:  go to room if call light pushed    Assessment:     Dutch Olivier is a 79 y.o. male with dysphagia further complicated by recent event of respiratory distress. Pt  to otherwise remain NPO with ice chips for pleasure.     Subjective     Pt awake/alert   Daughter present  RN in agreement with SLP seeing this date     Pain/Comfort:  Pain Rating Post-Intervention 1: 0/10    Respiratory Status: Comfort flow, flow 60 L/min, concentration 65%    Objective:     Has the patient been evaluated by SLP for swallowing?   Yes  Keep patient NPO? No     Pt with sudden/ change in status this AM. Upon arrival pt now on Airvo support when previously on room air when SLP assessed and made diet recommendations prior date of service. Per discussion with pt and RN pt with desat episode to the 80's with difficulty recovering and associated cyanosis. Pt denies eating or drinking anything for several hours prior to event. Difficult to determine if aspiration of mucus or reflux but low suspicion for complications from immediate aspiration of food or drink.  Unable to determine at this time if episode related to more chronic aspiration of PO intake and SLP will continue to closely monitor and assess. Pt and family report baseline coughing during and outside of PO intake given underlying COPD.  Pt exhibits strong clear vocal quality however consistently coughing while on airvo. Pt reporting feeling like he is breathing "more comfortably." SLP discussed NPO status at this time and AM meal held by RN. Pt with increased RR 30-40+. SLP discussed with " pt and daughter offering ice chips for pleasure only. Pt would likely benefit from a more objective swallow assessment  (via FEES or MBS) to rule out dysphagia and swallow dysfunction. All parties aware of and in agreement with plan. Speech to continue to follow.     Goals:   Multidisciplinary Problems       SLP Goals          Problem: SLP    Goal Priority Disciplines Outcome   SLP Goal     SLP Progressing   Description: Speech Language Pathology Goals  Goals expected to be met by 6/13    1. Pt will participate in ongoing swallow assessment                          Plan:     Patient to be seen:  4 x/week   Plan of Care expires:  06/27/25  Plan of Care reviewed with:  patient, family   SLP Follow-Up:  Yes       Discharge recommendations:   (tbd)   Barriers to Discharge:  None    Time Tracking:     SLP Treatment Date:   06/02/25  Speech Start Time:  0900  Speech Stop Time:  0916     Speech Total Time (min):  16 min    Billable Minutes: Treatment Swallowing Dysfunction 8 and Self Care/Home Management Training 8    06/02/2025

## 2025-06-02 NOTE — PLAN OF CARE
Discharge order canceled, PREETI re-set to 6/2. Patient will need rolling walker prior to discharge.      Enedina Stack RN  Weekend  - [facility] [place] [facility]

## 2025-06-02 NOTE — PLAN OF CARE
SICU PLAN OF CARE    Dx: Anaphylaxis    Goals of Care: -180    Vital Signs (last 12 hours):   Temp:  [98.7 °F (37.1 °C)-99.5 °F (37.5 °C)]   Pulse:  [53-94]   Resp:  [13-44]   BP: ()/()   SpO2:  [80 %-100 %]      Neuro: AAOx4 and Follows commands     Cardiac: Rhythm: normal sinus rhythm    Respiratory: 3L Nasal Cannula     Urine Output: External Catheter  550 mL/shift    Diet: Ice Chips     Labs/Accuchecks: ACHS accuchecks, am labs    Skin:  No new breakdown noted.  Patient turned q2h, bony prominences protected, and mattress inflated/working correctly.     Shift Events:  Desat event this am, see note for futher details, patient now on 3L NC. Stepdown orders placed.  See flowsheet for further assessment/details.  Family updated on current condition/plan of care, questions answered, and emotional support provided.  MD updated on current condition, vitals, labs, and gtts.

## 2025-06-03 PROBLEM — Z95.2 HISTORY OF TRANSCATHETER AORTIC VALVE REPLACEMENT (TAVR): Status: ACTIVE | Noted: 2025-06-03

## 2025-06-03 LAB
ABSOLUTE EOSINOPHIL (OHS): 0.26 K/UL
ABSOLUTE MONOCYTE (OHS): 1.13 K/UL (ref 0.3–1)
ABSOLUTE NEUTROPHIL COUNT (OHS): 6.69 K/UL (ref 1.8–7.7)
ALBUMIN SERPL BCP-MCNC: 2.8 G/DL (ref 3.5–5.2)
ALP SERPL-CCNC: 56 UNIT/L (ref 40–150)
ALT SERPL W/O P-5'-P-CCNC: 14 UNIT/L (ref 10–44)
ANION GAP (OHS): 10 MMOL/L (ref 8–16)
AST SERPL-CCNC: 15 UNIT/L (ref 11–45)
BASOPHILS # BLD AUTO: 0.05 K/UL
BASOPHILS NFR BLD AUTO: 0.5 %
BILIRUB SERPL-MCNC: 0.5 MG/DL (ref 0.1–1)
BUN SERPL-MCNC: 16 MG/DL (ref 8–23)
CALCIUM SERPL-MCNC: 8.3 MG/DL (ref 8.7–10.5)
CHLORIDE SERPL-SCNC: 116 MMOL/L (ref 95–110)
CO2 SERPL-SCNC: 20 MMOL/L (ref 23–29)
CREAT SERPL-MCNC: 0.7 MG/DL (ref 0.5–1.4)
ERYTHROCYTE [DISTWIDTH] IN BLOOD BY AUTOMATED COUNT: 13 % (ref 11.5–14.5)
GFR SERPLBLD CREATININE-BSD FMLA CKD-EPI: >60 ML/MIN/1.73/M2
GLUCOSE SERPL-MCNC: 96 MG/DL (ref 70–110)
HCT VFR BLD AUTO: 28.3 % (ref 40–54)
HGB BLD-MCNC: 9.1 GM/DL (ref 14–18)
IMM GRANULOCYTES # BLD AUTO: 0.08 K/UL (ref 0–0.04)
IMM GRANULOCYTES NFR BLD AUTO: 0.8 % (ref 0–0.5)
LYMPHOCYTES # BLD AUTO: 2.05 K/UL (ref 1–4.8)
MAGNESIUM SERPL-MCNC: 2.1 MG/DL (ref 1.6–2.6)
MCH RBC QN AUTO: 29.2 PG (ref 27–31)
MCHC RBC AUTO-ENTMCNC: 32.2 G/DL (ref 32–36)
MCV RBC AUTO: 91 FL (ref 82–98)
NUCLEATED RBC (/100WBC) (OHS): 0 /100 WBC
PLATELET # BLD AUTO: 181 K/UL (ref 150–450)
PMV BLD AUTO: 10.1 FL (ref 9.2–12.9)
POCT GLUCOSE: 101 MG/DL (ref 70–110)
POCT GLUCOSE: 142 MG/DL (ref 70–110)
POTASSIUM SERPL-SCNC: 3.3 MMOL/L (ref 3.5–5.1)
PROT SERPL-MCNC: 5.9 GM/DL (ref 6–8.4)
RBC # BLD AUTO: 3.12 M/UL (ref 4.6–6.2)
RELATIVE EOSINOPHIL (OHS): 2.5 %
RELATIVE LYMPHOCYTE (OHS): 20 % (ref 18–48)
RELATIVE MONOCYTE (OHS): 11 % (ref 4–15)
RELATIVE NEUTROPHIL (OHS): 65.2 % (ref 38–73)
SODIUM SERPL-SCNC: 146 MMOL/L (ref 136–145)
WBC # BLD AUTO: 10.26 K/UL (ref 3.9–12.7)

## 2025-06-03 PROCEDURE — 25000003 PHARM REV CODE 250: Performed by: STUDENT IN AN ORGANIZED HEALTH CARE EDUCATION/TRAINING PROGRAM

## 2025-06-03 PROCEDURE — 99900035 HC TECH TIME PER 15 MIN (STAT)

## 2025-06-03 PROCEDURE — 63600175 PHARM REV CODE 636 W HCPCS: Performed by: INTERNAL MEDICINE

## 2025-06-03 PROCEDURE — 25000242 PHARM REV CODE 250 ALT 637 W/ HCPCS

## 2025-06-03 PROCEDURE — 36415 COLL VENOUS BLD VENIPUNCTURE: CPT

## 2025-06-03 PROCEDURE — 20600001 HC STEP DOWN PRIVATE ROOM

## 2025-06-03 PROCEDURE — 63600175 PHARM REV CODE 636 W HCPCS

## 2025-06-03 PROCEDURE — 97535 SELF CARE MNGMENT TRAINING: CPT

## 2025-06-03 PROCEDURE — 94761 N-INVAS EAR/PLS OXIMETRY MLT: CPT

## 2025-06-03 PROCEDURE — 94640 AIRWAY INHALATION TREATMENT: CPT

## 2025-06-03 PROCEDURE — 97116 GAIT TRAINING THERAPY: CPT | Mod: CQ

## 2025-06-03 PROCEDURE — 63600175 PHARM REV CODE 636 W HCPCS: Performed by: STUDENT IN AN ORGANIZED HEALTH CARE EDUCATION/TRAINING PROGRAM

## 2025-06-03 PROCEDURE — 25000003 PHARM REV CODE 250: Performed by: INTERNAL MEDICINE

## 2025-06-03 PROCEDURE — 27000221 HC OXYGEN, UP TO 24 HOURS

## 2025-06-03 PROCEDURE — S5010 5% DEXTROSE AND 0.45% SALINE: HCPCS | Performed by: STUDENT IN AN ORGANIZED HEALTH CARE EDUCATION/TRAINING PROGRAM

## 2025-06-03 PROCEDURE — 80053 COMPREHEN METABOLIC PANEL: CPT

## 2025-06-03 PROCEDURE — 92612 ENDOSCOPY SWALLOW (FEES) VID: CPT

## 2025-06-03 PROCEDURE — 83735 ASSAY OF MAGNESIUM: CPT

## 2025-06-03 PROCEDURE — 92526 ORAL FUNCTION THERAPY: CPT

## 2025-06-03 PROCEDURE — 25000003 PHARM REV CODE 250

## 2025-06-03 PROCEDURE — 85025 COMPLETE CBC W/AUTO DIFF WBC: CPT

## 2025-06-03 RX ORDER — HYDROCHLOROTHIAZIDE 12.5 MG/1
12.5 TABLET ORAL DAILY
Status: DISCONTINUED | OUTPATIENT
Start: 2025-06-03 | End: 2025-06-04 | Stop reason: HOSPADM

## 2025-06-03 RX ORDER — LISINOPRIL 20 MG/1
40 TABLET ORAL DAILY
Status: DISCONTINUED | OUTPATIENT
Start: 2025-06-03 | End: 2025-06-04 | Stop reason: HOSPADM

## 2025-06-03 RX ADMIN — AMPICILLIN SODIUM AND SULBACTAM SODIUM 3 G: 2; 1 INJECTION, POWDER, FOR SOLUTION INTRAMUSCULAR; INTRAVENOUS at 12:06

## 2025-06-03 RX ADMIN — IPRATROPIUM BROMIDE AND ALBUTEROL SULFATE 3 ML: 2.5; .5 SOLUTION RESPIRATORY (INHALATION) at 04:06

## 2025-06-03 RX ADMIN — HEPARIN SODIUM 5000 UNITS: 5000 INJECTION INTRAVENOUS; SUBCUTANEOUS at 10:06

## 2025-06-03 RX ADMIN — BICALUTAMIDE 50 MG: 50 TABLET ORAL at 09:06

## 2025-06-03 RX ADMIN — CLOPIDOGREL BISULFATE 75 MG: 75 TABLET, FILM COATED ORAL at 09:06

## 2025-06-03 RX ADMIN — POTASSIUM CHLORIDE: 2 INJECTION, SOLUTION, CONCENTRATE INTRAVENOUS at 10:06

## 2025-06-03 RX ADMIN — IPRATROPIUM BROMIDE AND ALBUTEROL SULFATE 3 ML: 2.5; .5 SOLUTION RESPIRATORY (INHALATION) at 12:06

## 2025-06-03 RX ADMIN — AMPICILLIN SODIUM AND SULBACTAM SODIUM 3 G: 2; 1 INJECTION, POWDER, FOR SOLUTION INTRAMUSCULAR; INTRAVENOUS at 05:06

## 2025-06-03 RX ADMIN — AMPICILLIN SODIUM AND SULBACTAM SODIUM 3 G: 2; 1 INJECTION, POWDER, FOR SOLUTION INTRAMUSCULAR; INTRAVENOUS at 06:06

## 2025-06-03 RX ADMIN — IPRATROPIUM BROMIDE AND ALBUTEROL SULFATE 3 ML: 2.5; .5 SOLUTION RESPIRATORY (INHALATION) at 09:06

## 2025-06-03 RX ADMIN — LISINOPRIL 40 MG: 20 TABLET ORAL at 11:06

## 2025-06-03 RX ADMIN — IPRATROPIUM BROMIDE AND ALBUTEROL SULFATE 3 ML: 2.5; .5 SOLUTION RESPIRATORY (INHALATION) at 08:06

## 2025-06-03 RX ADMIN — AMPICILLIN SODIUM AND SULBACTAM SODIUM 3 G: 2; 1 INJECTION, POWDER, FOR SOLUTION INTRAMUSCULAR; INTRAVENOUS at 11:06

## 2025-06-03 RX ADMIN — POLYETHYLENE GLYCOL 3350 17 G: 17 POWDER, FOR SOLUTION ORAL at 05:06

## 2025-06-03 RX ADMIN — HYDROCHLOROTHIAZIDE 12.5 MG: 12.5 TABLET ORAL at 09:06

## 2025-06-03 RX ADMIN — HEPARIN SODIUM 5000 UNITS: 5000 INJECTION INTRAVENOUS; SUBCUTANEOUS at 02:06

## 2025-06-03 RX ADMIN — PRAVASTATIN SODIUM 40 MG: 40 TABLET ORAL at 09:06

## 2025-06-03 RX ADMIN — HEPARIN SODIUM 5000 UNITS: 5000 INJECTION INTRAVENOUS; SUBCUTANEOUS at 06:06

## 2025-06-03 RX ADMIN — POTASSIUM CHLORIDE: 2 INJECTION, SOLUTION, CONCENTRATE INTRAVENOUS at 09:06

## 2025-06-03 NOTE — NURSING
Home Oxygen Evaluation    Date Performed:     1) Patient's Home O2 Sat on room air, while at rest: 95%         If O2 sats on room air at rest are 88% or below, patient qualifies. No additional testing needed. Document N/A in steps 2 and 3. If 89% or above, complete steps 2.      2) Patient's O2 Sat on room air while exercisin%        If O2 sats on room air while exercising remain 89% or above patient does not qualify, no further testing needed Document N/A in step 3. If O2 sats on room air while exercising are 88% or below, continue to step 3.      3) Patient's O2 Sat while exercising on O2: 96% at 2 LPM         (Must show improvement from #2 for patients to qualify)    If O2 sats improve on oxygen, patient qualifies for portable oxygen. If not, the patient does not qualify.

## 2025-06-03 NOTE — NURSING
Pt stated he is a pre diabetic and does not take insulin at home. Blood sugars are in normal range notified MD [place] Snowden and ordered to d/c ACHS orders.

## 2025-06-03 NOTE — PT/OT/SLP EVAL
Speech Language Pathology  [facility]  Fiberoptic Endoscopic Evaluation of Swallowing (FEES)    Patient Name:  Dutch Olivier   MRN:  [id number]    Impression:     The patient tolerated the procedure and the equipment was removed. Findings were consistent with the following swallow diagnoses following swallow diagnosis and severity: Functional oral and pharyngeal phases    Dysphagia is further characterized by intact swallow function without evidence of penetration or aspiration with all consistencies presented.      Recommendations:       General Recommendations: Follow-up not indicated  Diet Recommendations:  Regular Diet - IDDSI Level 7, Thin liquids - IDDSI Level 0   Medications: Whole with liquid wash   Aspiration Precautions: Standard aspiration precautions  General Precautions: Standard, fall  Communication Strategies: none    Referral:     Reason for Referral  Patient was referred for a Fiberoptic Endoscopic Evaluation of a Swallow (FEES) to assess the efficiency of swallow function, rule out aspiration and make recommendations regarding safe dietary consistencies, effective compensatory strategies, and safe eating environment. Please refer to initial assessments and H&P for detailed/pertinent past medical history.    Diagnosis: Anaphylaxis     History:           Past Medical History:   Diagnosis Date    Alcohol abuse, daily use     8/2024 stopped    Amblyopia     OS    Aortic valve stenosis     Cancer     melanoma, prostate    Cataract     Complication of anesthesia     says he sometimes is slow to awaken    COPD (chronic obstructive pulmonary disease)     no oxygen, no inhalers, or nebulizers    Diverticulosis     Gross hematuria     History of colonic polyps     History of malignant melanoma 01/01/2011    right ear, had chemo//Dr Gongora    HTN (hypertension)     Hyperlipemia     Kidney stone        Subjective:     General Appearance:       Behavior/State: awake   Feeding tube present:  No   Tracheostomy present: No   Respiratory Status: Nasal cannula    Pain: Pain Rating 1: 0/10    Additional Information: in bed    Objective:     Dutch HINSON was seen today for a fiberoptic endoscopic evaluation of swallowing (FEES). The patient was positioned upright in bed.    The patient's name and medical record number were verified via verbal consent and/or visualization of wristband. The purpose, procedure, and risks of FEES were explained to the patient. The patient expressed an understanding of potential risks and verbally consented to the procedure. Patient without known allergy to foods or synthetic food dyes offered during today's assessment.    Flexible Fiberoptic Endoscope was advanced transnasally through the most patent nasal cavity to the level of the velopharynx and larynx. A video of the examination was recorded.    Oral Mechanism Examination  Oral Musculature: WFL  Dentition: present and adequate  Secretion Management: adequate  Mucosal Quality: good  Mandibular Strength and Mobility: WFL  Oral Labial Strength and Mobility: functional retraction, functional pursing, functional seal  Lingual Strength and Mobility: functional protrusion, functional anterior elevation, functional lateral movement  Volitional Cough: elicited, weak  Volitional Swallow: elicited  Voice Prior to PO Intake: clear, strong    Scope Utilized: Pentax #[id number]    Nares Entry: right    Visualization of Anatomy:      Velopharynx: Structure and function of the velum, lateral pharyngeal walls, and posterior pharyngeal wall appeared WFL   Epiglottis: Intact   Arytenoids: symmetric mobility appreciated   True vocal folds: Intact and symmetric mobility appreciated   Secretion Management: adequate- intermittent MILD pooling of secretions within valleculae well managed by spontaneous swallows   Glottis: adequate airway patency at the level of the glottis          VF adduction          VF abduction      Oral phase:  The oral phase cannot be  directly observed. The following behaviors are determined by the manner in which the bolus enters the pharynx. Oral phase of swallow was characterized by functional lip closure, tongue control during bolus hold, bolus preparation/mastication, and bolus transport/lingual motion across all consistencies trialed.       Pharyngeal Phase:     Consistency Method/Volume Observation Compensatory Strategy   Thin Straw Age-appropriate delay to the valleculae considered to be a variant of normal swallow function   Adequate airway protection  with no penetration/aspiration before, during, or after the swallow  MIN vallecular residue cleared with a spontaneous secondary swallow N/A   Solid Self-regulated bites of turkey sandwich  Timely swallow initiation    Adequate airway protection  with no penetration/aspiration before, during, or after the swallow  No significant pharyngeal residue noted N/A       During/Post-Procedure Respiratory Status: Nasal cannula    Additional Information: in bed    Prognosis:     Excellent    Prognostic Barriers: family support, medical prognosis, and previous level of function    Education:     SLP provided education to pt and daughter regarding overall impressions, recommendations for a regular diet with thin liquids, and ongoing SLP POC. Pt and family verbalized understanding and had no additional questions or concerns upon SLP exit.      Plan:     No additional follow up warranted within acute care setting     Time Tracking:     SLP Treatment Date: 06/03/25  Speech Start Time: 0845  Speech Stop Time: 0915     Speech Total Time (min): 30 min    Billable Minutes: FEES Billable Minutes: Endoscopy Swallow Test 20 and Self Care/Home Management Training 10      06/03/2025

## 2025-06-03 NOTE — PT/OT/SLP PROGRESS
"Physical Therapy Treatment    Patient Name:  Dutch Olivier   MRN:  [id number]    Recommendations:     Discharge Recommendations: Low Intensity Therapy  Discharge Equipment Recommendations: bedside commode  Barriers to discharge: None    Assessment:     Dutch Olivier is a 79 y.o. male admitted with a medical diagnosis of Anaphylaxis.  He presents with the following impairments/functional limitations: weakness, impaired endurance, impaired self care skills, impaired functional mobility, gait instability, impaired balance, impaired cardiopulmonary response to activity, decreased lower extremity function, decreased upper extremity function, decreased safety awareness. Pt found in bed and agreeable to session as patient is very motivated to improve. Pt is progressing well. Accepted increased distance during gait trail with no rest breaks. Minimal LOB on two occasions when distracted with good recovery by patient via stepping strategy. Attempt to introduce quad cane on R (due to L hip weakness) at next session. Pt would benefit from continued PT to improve functional independence.      Rehab Prognosis: Good; patient would benefit from acute skilled PT services to address these deficits and reach maximum level of function.    Recent Surgery: Procedure(s) (LRB):  REPLACEMENT, AORTIC VALVE, TRANSCATHETER (TAVR) (N/A)  Cardiac Cath Cosurgeon (N/A)  REPLACEMENT, AORTIC VALVE, TRANSCATHETER (TAVR)  PTA, Iliac Artery 5 Days Post-Op    Plan:     During this hospitalization, patient to be seen 4 x/week to address the identified rehab impairments via therapeutic exercises, therapeutic activities, gait training, neuromuscular re-education and progress toward the following goals:    Plan of Care Expires:  06/30/25    Subjective     Chief Complaint: none stated  Patient/Family Comments/goals: "I'm ready to walk!"  Pain/Comfort:  Pain Rating 1: 0/10      Objective:     Communicated with nurse prior to session.  Patient found HOB " elevated with telemetry, Condom Catheter, peripheral IV, oxygen upon PT entry to room.     General Precautions: Standard, fall  Orthopedic Precautions: N/A  Braces: N/A  Respiratory Status: Nasal cannula, flow 2 L/min    Functional Mobility:  Bed Mobility:     Scooting EOB: supervision  Supine to sit: supervision  Transfers:    Sit <> stand: SBA  Balance: seated balance: supervision  Standing balance: SBA to CGA  Gait: 1 trail: 315 ft RW, 10 R HR in hallway SBA  Deviations: head down gaze, ant trunk lean, unsteady at times 2/2 to coughing and removing hands off of RW occasionally when speaking  Increased ant trunk lean with R lateral lean with R HR ambulation 2/2 handrail height too low for patient to maintain appropriate posture, however, maintained balance well    AM-PAC 6 CLICK MOBILITY  Turning over in bed (including adjusting bedclothes, sheets and blankets)?: 4  Sitting down on and standing up from a chair with arms (e.g., wheelchair, bedside commode, etc.): 3  Moving from lying on back to sitting on the side of the bed?: 4  Moving to and from a bed to a chair (including a wheelchair)?: 3  Need to walk in hospital room?: 3  Climbing 3-5 steps with a railing?: 1  Basic Mobility Total Score: 18       Treatment & Education:  Therapist provided instruction and educated for safety during transfers and gait training. As well as proper body mechanics, energy conservation, and fall prevention strategies during tasks listed above.  Treatment focused on gait training with RW today. Attempt quad cane at next visit.  PT/PTA conference in regards to changing intensity level from mod to low due to improved functional mobility and endurance gains.    Patient left up in chair with all lines intact, call button in reach, and daughter present..    GOALS:   Multidisciplinary Problems       Physical Therapy Goals          Problem: Physical Therapy    Goal Priority Disciplines Outcome Interventions   Physical Therapy Goal     PT,  PT/OT Progressing    Description: Goals to be met by: 25     Patient will increase functional independence with mobility by performin. Supine to sit with Stand-by Assistance  2. Sit to stand transfer with Supervision  3. Bed to chair transfer with Supervision using LRAD  4. Gait  x 100 feet with Stand-by Assistance using LRAD.   5. Ascend/descend 3 stair with bilateral Handrails Contact Guard Assistance using LRAD.   6. Stand for 5 minutes with Stand-by Assistance using LRAD                           Time Tracking:     PT Received On: 25  PT Start Time: 1020     PT Stop Time: 1044  PT Total Time (min): 24 min     Billable Minutes: Gait Training 24    Treatment Type: Treatment  PT/PTA: PTA     Number of PTA visits since last PT visit: 2025

## 2025-06-03 NOTE — PLAN OF CARE
Patient transferred to [facility] for continued medical management.     CM/SW has been discussing discharge plan    Discharge Plan A: Home with family, Home Health  Discharge Plan B: Home    with patient and Extended Emergency Contact Information  Primary Emergency Contact: Mariella Olivier  Address:  [address] .           [place] [address] [facility]  Mobile Phone: [phone]  Relation: Spouse  Secondary Emergency Contact: Kim Clarke  Mobile Phone: [phone]  Relation: Daughter.     Claribel Garcia RN, BSN  Case Management  [phone]

## 2025-06-03 NOTE — PT/OT/SLP PROGRESS
Occupational Therapy      Patient Name:  Dutch Olivier   MRN:  [id number]    Patient not seen today secondary to Nursing care. Pt requiring IV placement via nurse per OT attempt at 1135. OT unable to follow up in AM. Will follow-up as appropriate.    6/3/2025

## 2025-06-03 NOTE — NURSING TRANSFER
Nursing Transfer Note      6/2/2025   8:40 PM    Report given to receiving nurse VIOLETTE Parks. Pt transferred to  via wheelchair. Pt connected to continuous telemetry box # [id number], confirmed with tele tech, NSR 66bpm at transfer. VSS throughout transfer. On 3L nasal canula. Pt's daughter at bedside with pt's belongings. Pt oriented to room, bed locked, in lowest position, call light in reach. Bedside handoff given.

## 2025-06-03 NOTE — DISCHARGE SUMMARY
[facility] - Cardiology Stepdown  Cardiology  Discharge Summary      Patient Name: Dutch Olivier  MRN: [id number]  Admission Date: 5/28/2025  Hospital Length of Stay: 7 days  Discharge Date and Time: 06/04/2025 3:15 PM  Attending Physician: Syeda att. providers found    Discharging Provider: Remi Candelaria MD  Primary Care Physician: David Arreola MD    HPI:   Dutch Olivier is a 80 yo M with a history of nonobstructive CAD, COPD, HLD, HTN, prostate ca w bone mets, and severe AS who presented for outpatient TAVR. Admitted to CCU for acute anaphylaxis pre-procedure.     Patient presented today for outpatient TAVR. Prior to the procedure, he has a peripheral IV inserted with normal saline injected. He then became acutely dyspneic, tachycardic, and diaphoretic. Diffuse rash ntoed across torso. He became unresponsive, though did not lose a pulse. He was placed on NRB. Given Cesar pushes x3, Epi gtt started. Given solumedrol, benadryl, and albuterol nebs. Concern for possible HF contributing and given Lasix. Levo was also added on prior to transfer to the ICU.     On evaluation in the ICU, patient on Epi and Levo with MAPs >65. Sating well on 6L NC. CVL and ART line placed bedside. He reports feeling well at this time, denying chest pain, dyspnea, pre-syncope. Gtts since weaned with Levo discontinued and Epi 0.1. Sating well on 3L NC.     Procedure(s) (LRB):  REPLACEMENT, AORTIC VALVE, TRANSCATHETER (TAVR) (N/A)  Cardiac Cath Cosurgeon (N/A)  REPLACEMENT, AORTIC VALVE, TRANSCATHETER (TAVR)  PTA, Iliac Artery     Indwelling Lines/Drains at time of discharge:  Lines/Drains/Airways       Drain  Duration             Male External Urinary Catheter 06/01/25 1800 Small 1 day                    Hospital Course:  Patient admitted to the CCU d/o anaphylaxis treated with epinephrine and levophed. The patient underwent TAVR on 05/29/2025. Following the successful procedure, he was intubated and placed on mechanical  ventilation due to aspiration. A CT head was obtained to evaluate for possible stroke in the setting of post-procedural dysphagia, imaging showed no acute intracranial pathology. He was subsequently reassessed by Speech Therapy and cleared for a regular diet with thin liquids. On 06/02/2025, the patient developed sudden-onset acute hypoxic respiratory failure, which improved with high-flow oxygen therapy. 6MWT was performed and indicated a need for supplemental oxygen with exertion.      Mr. Olivier was discharged today on Clopidogrel, his chronic medications, and home oxygen. Follow up with primary Cardiologist.    Vitals:    06/04/25 0747   BP:    Pulse: 84   Resp: 14   Temp:          Physical Exam  Vitals and nursing note reviewed.   Constitutional:       Appearance: Normal appearance.   HENT:      Head: Normocephalic and atraumatic.      Nose: No congestion or rhinorrhea.   Eyes:      General:         Right eye: No discharge.         Left eye: No discharge.   Cardiovascular:      Rate and Rhythm: Regular rhythm. Bradycardia present.   Pulmonary:      Effort: No respiratory distress.      Breath sounds: No wheezing.   Abdominal:      General: There is no distension.      Tenderness: There is no abdominal tenderness.   Musculoskeletal:      Right lower leg: No edema.      Left lower leg: No edema.   Neurological:      Mental Status: He is alert and oriented to person, place, and time.   Psychiatric:         Mood and Affect: Mood normal.         Behavior: Behavior normal.        Goals of Care Treatment Preferences:         Consults:     Significant Diagnostic Studies: N/A    Pending Diagnostic Studies:       None            Final Active Diagnoses:    Diagnosis Date Noted POA    PRINCIPAL PROBLEM:  Anaphylaxis [T78.2XXA] 05/28/2025 No    Other reduced mobility [Z74.09] 06/04/2025 No    Anemia [D64.9] 06/04/2025 Yes    History of transcatheter aortic valve replacement (TAVR) [Z95.2] 06/03/2025 Not Applicable    Acute  "hypoxic respiratory failure [J96.01] 06/02/2025 No    Tobacco dependency [F17.200] 06/02/2025 Yes    Aspiration of stomach contents [T17.918A] 05/30/2025 No    Aortic stenosis [I35.0] 10/25/2022 Yes    Prostate cancer metastatic to bone [C61, C79.51] 05/19/2022 Yes    Essential hypertension [I10] 09/25/2012 Yes    Hyperlipemia [E78.5]  Yes    COPD (chronic obstructive pulmonary disease) [J44.9]  Yes      Problems Resolved During this Admission:     No new Assessment & Plan notes have been filed under this hospital service since the last note was generated.  Service: Cardiology      Discharged Condition: stable    Disposition: Home or Self Care    Follow Up:    Patient Instructions:      OXYGEN FOR HOME USE     Order Specific Question Answer Comments   Liter Flow 2    Duration With activity prn   Qualifying Test Performed at: Activity    Oxygen saturation at rest 95    Oxygen saturation with activity 87    Oxygen saturation with activity on oxygen 95    Portable mode: continuous    Route nasal cannula    Device: home concentrator with portable tanks    Length of need (in months): 3 mos    Patient condition with qualifying saturation Other - List qualifying diagnosis and code    Select a diagnosis & list the code in the comments Aspiration into airway [[id number]]    Height: 6' 2.02" (1.88 m)    Weight: 83.9 kg (185 lb)    Alternative treatment measures have been tried or considered and deemed clinically ineffective. Yes      Reason for not Ordering Smoking Cessation Referral     Order Specific Question Answer Comments   Reason for not ordering: Not medically appropriate at this time      Reason for not Prescribing Nicotine Replacement     Order Specific Question Answer Comments   Reason for not Prescribing: Not medically appropriate at this time      Medications:  Reconciled Home Medications:      Medication List        START taking these medications      clopidogreL 75 mg tablet  Commonly known as: PLAVIX  Take 1 " tablet (75 mg total) by mouth once daily.            CONTINUE taking these medications      b complex vitamins tablet  Take 1 tablet by mouth once daily.     benazepriL 20 MG tablet  Commonly known as: LOTENSIN  Take 1 tablet (20 mg total) by mouth 2 (two) times daily.     bicalutamide 50 MG Tab  Commonly known as: CASODEX  TAKE 1 TABLET (50 MG TOTAL) BY MOUTH ONCE DAILY.     hydroCHLOROthiazide 12.5 MG Tab  Take 1 tablet (12.5 mg total) by mouth every morning.     multivitamin capsule  Take 1 capsule by mouth once daily.     pravastatin 40 MG tablet  Commonly known as: PRAVACHOL  Take 1 tablet (40 mg total) by mouth once daily.              Time spent on the discharge of patient: 45 minutes    Remi Candelaria MD  Cardiology  [facility] - Cardiology Stepdown

## 2025-06-03 NOTE — SUBJECTIVE & OBJECTIVE
Interval History: DOROTHY, Hypertensive, started on his home antihypertensive meds. Saturating well on RA.  6-minute walk test performed- patient requires home oxygen. Order has been placed, but H[facility] was already closed for the day. Discharge planned for tomorrow morning.    ROS  Objective:     Vital Signs (Most Recent):  Temp: 98.7 °F (37.1 °C) (06/03/25 1118)  Pulse: 65 (06/03/25 1631)  Resp: 18 (06/03/25 1631)  BP: (!) 125/58 (06/03/25 1118)  SpO2: 96 % (06/03/25 1118) Vital Signs (24h Range):  Temp:  [97.7 °F (36.5 °C)-98.8 °F (37.1 °C)] 98.7 °F (37.1 °C)  Pulse:  [50-76] 65  Resp:  [16-31] 18  SpO2:  [96 %-100 %] 96 %  BP: (125-183)/(58-76) 125/58     Weight: 83.9 kg (185 lb)  Body mass index is 23.74 kg/m².     SpO2: 96 %         Intake/Output Summary (Last 24 hours) at 6/3/2025 1716  Last data filed at 6/3/2025 0620  Gross per 24 hour   Intake 17.37 ml   Output 600 ml   Net -582.63 ml       Lines/Drains/Airways       Drain  Duration             Male External Urinary Catheter 06/01/25 1800 Small 1 day              Peripheral Intravenous Line  Duration                  Peripheral IV - Single Lumen 06/02/25 1625 22 G Posterior;Right Forearm 1 day                       Physical Exam  Vitals and nursing note reviewed.   Constitutional:       Appearance: Normal appearance.   HENT:      Head: Normocephalic and atraumatic.      Nose: No congestion or rhinorrhea.   Eyes:      General:         Right eye: No discharge.         Left eye: No discharge.   Cardiovascular:      Rate and Rhythm: Regular rhythm. Bradycardia present.   Pulmonary:      Effort: No respiratory distress.      Breath sounds: No wheezing.   Abdominal:      General: There is no distension.      Tenderness: There is no abdominal tenderness.   Musculoskeletal:      Right lower leg: No edema.      Left lower leg: No edema.   Neurological:      Mental Status: He is alert and oriented to person, place, and time.   Psychiatric:         Mood and Affect: Mood  normal.         Behavior: Behavior normal.            Significant Labs: All pertinent lab results from the last 24 hours have been reviewed.    Significant Imaging: Echocardiogram: Transthoracic echo (TTE) complete (Cupid Only):   Results for orders placed or performed during the hospital encounter of 04/22/25   Echo   Result Value Ref Range    BSA 2.09 m2    LVOT stroke volume 104.5 cm3    LVIDd 5.3 3.5 - 6.0 cm    LV Systolic Volume 67 mL    LV Systolic Volume Index 31.9 mL/m2    LVIDs 3.9 2.1 - 4.0 cm    LV ESV A2C 60.27 mL    LV Diastolic Volume 133 mL    LV ESV A4C 44.40 mL    LV Diastolic Volume Index 63.33 mL/m2    Left Ventricular End Systolic Volume by Teichholz Method 66.66 mL    Left Ventricular End Diastolic Volume by Teichholz Method 133.35 mL    IVS 1.0 0.6 - 1.1 cm    LVOT diameter 2.6 cm    LVOT area 5.3 cm2    FS 26.4 (A) 28 - 44 %    Left Ventricle Relative Wall Thickness 0.34 cm    PW 0.9 0.6 - 1.1 cm    LV mass 187.3 g    LV Mass Index 89.2 g/m2    MV Peak E Maxime 0.65 m/s    TDI LATERAL 0.08 m/s    TDI SEPTAL 0.06 m/s    E/E' ratio 9 m/s    MV Peak A Maxime 0.70 m/s    TR Max Maxime 2.8 m/s    E/A ratio 0.93     IVRT 111 msec    E wave deceleration time 236 msec    LV SEPTAL E/E' RATIO 10.8 m/s    LV LATERAL E/E' RATIO 8.1 m/s    PV Peak S Maxime 0.80 m/s    PV Peak D Maxime 0.59 m/s    Pulm vein S/D ratio 1.36     LVOT peak maxime 0.7 m/s    Left Ventricular Outflow Tract Mean Velocity 0.48 cm/s    Left Ventricular Outflow Tract Mean Gradient 1.04 mmHg    RV- cuello basal diam 3.7 cm    RV-cuello mid d 2.6 cm    RV Basal Diameter 6.52 cm    RV-cuello length 6.5 cm    RV mid diameter 2.56 cm    RV S' 13.64 cm/s    TAPSE 2.29 cm    RV/LV Ratio 0.70 cm    LA size 4.3 cm    LA Vol (MOD) 53 mL    RUBEN (MOD) 25 mL/m2    AV regurgitation pressure 1/2 time 678 ms    AR Max Maxime 4.09 m/s    AV mean gradient 44 mmHg    AV peak gradient 71 mmHg    Ao peak maxime 4.2 m/s    Ao .5 cm    LVOT peak VTI 19.7 cm    AV valve area  0.9 cm²    AV Velocity Ratio 0.17     AV index (prosthetic) 0.17     KAMRAN by Velocity Ratio 0.9 cm²    MV stenosis pressure 1/2 time 68.50 ms    MV valve area p 1/2 method 3.21 cm2    Triscuspid Valve Regurgitation Peak Gradient 32 mmHg    Sinus 3.21 cm    STJ 2.76 cm    Ascending aorta 3.27 cm    Mean e' 0.07 m/s    ZLVIDS -0.18     ZLVIDD -2.08     LA area A4C 18.64 cm2    LA area A2C 21.58 cm2    RVDD 3.69 cm    TV resting pulmonary artery pressure 34 mmHg    RV TB RVSP 6 mmHg    Est. RA pres 3 mmHg    Narrative      Left Ventricle: The left ventricle is normal in size. Normal wall   thickness. There is normal systolic function with a visually estimated   ejection fraction of 60 - 65%. There is normal diastolic function.    Right Ventricle: The right ventricle is normal in size. Wall thickness   is normal. Systolic function is normal.    Aortic Valve: The aortic valve is a trileaflet valve. There is moderate   to severe aortic valve sclerosis. Severely restricted motion. There is   severe stenosis. Aortic valve area by VTI is 0.9 cm². Aortic valve peak   velocity is 4.2 m/s. Mean gradient is 44 mmHg. The dimensionless index is   0.17.    Pulmonary Artery: There is borderline elevated pulmonary hypertension.   The estimated pulmonary artery systolic pressure is 34 mmHg.    IVC/SVC: Normal venous pressure at 3 mmHg.

## 2025-06-03 NOTE — ASSESSMENT & PLAN NOTE
The patient underwent TAVR on 05/29/2025. Following the successful procedure, he was intubated and placed on mechanical ventilation due to aspiration. A CT head was obtained to evaluate for possible stroke in the setting of post-procedural dysphagia, imaging showed no acute intracranial pathology. He was subsequently reassessed by Speech Therapy and cleared for a regular diet with thin liquids. On 06/02/2025, the patient developed sudden-onset acute hypoxic respiratory failure, which improved with high-flow oxygen therapy. 6MWT was performed and indicated a need for supplemental oxygen with exertion.    - intubated and placed on mechanical ventilation for one day  - AC/VC with TV of 500, FiO2 of 40 and PEEP of 5  - failed speech therapy  - continue fluids for hydration and calories  - repeat swallow eval  - Swallow test not performed today d/o his acute hypoxic respiratory failure which is now improved  - Swallow test lio

## 2025-06-03 NOTE — PROGRESS NOTES
[facility] - [facility] Stepdown  Cardiology  Progress Note    Patient Name: Dutch Olivier  MRN: [id number]  Admission Date: 5/28/2025  Hospital Length of Stay: 6 days  Code Status: No Order   Attending Physician: Musa Vallecillo MD   Primary Care Physician: David Arreola MD  Expected Discharge Date: 6/6/2025  Principal Problem:Anaphylaxis    Subjective:     Hospital Course:   Patient admitted to the CCU d/o anaphylaxis treated with epinephrine and levophed. The patient underwent TAVR on 05/29/2025. Following the successful procedure, he was intubated and placed on mechanical ventilation due to aspiration. A CT head was obtained to evaluate for possible stroke in the setting of post-procedural dysphagia, imaging showed no acute intracranial pathology. He was subsequently reassessed by Speech Therapy and cleared for a regular diet with thin liquids. On 06/02/2025, the patient developed sudden-onset acute hypoxic respiratory failure, which improved with high-flow oxygen therapy. 6MWT was performed and indicated a need for supplemental oxygen with exertion.        Interval History: NOAEO, Hypertensive, started on his home antihypertensive meds. Saturating well on RA.  A 6 minute walk test was ordered, he was 95% on room air at rest and had desaturation to 87% while ambulating on room air. Improved to 96% with replacement of 2 liters oxygen.        ROS  Objective:     Vital Signs (Most Recent):  Temp: 98.7 °F (37.1 °C) (06/03/25 1118)  Pulse: 65 (06/03/25 1631)  Resp: 18 (06/03/25 1631)  BP: (!) 125/58 (06/03/25 1118)  SpO2: 96 % (06/03/25 1118) Vital Signs (24h Range):  Temp:  [97.7 °F (36.5 °C)-98.8 °F (37.1 °C)] 98.7 °F (37.1 °C)  Pulse:  [50-76] 65  Resp:  [16-31] 18  SpO2:  [96 %-100 %] 96 %  BP: (125-183)/(58-76) 125/58     Weight: 83.9 kg (185 lb)  Body mass index is 23.74 kg/m².     SpO2: 96 %         Intake/Output Summary (Last 24 hours) at 6/3/2025 1718  Last data filed at 6/3/2025 0663  Gross  per 24 hour   Intake 17.37 ml   Output 600 ml   Net -582.63 ml       Lines/Drains/Airways       Drain  Duration             Male External Urinary Catheter 06/01/25 1800 Small 1 day              Peripheral Intravenous Line  Duration                  Peripheral IV - Single Lumen 06/02/25 1625 22 G Posterior;Right Forearm 1 day                       Physical Exam  Vitals and nursing note reviewed.   Constitutional:       Appearance: Normal appearance.   HENT:      Head: Normocephalic and atraumatic.      Nose: No congestion or rhinorrhea.   Eyes:      General:         Right eye: No discharge.         Left eye: No discharge.   Cardiovascular:      Rate and Rhythm: Regular rhythm. Bradycardia present.   Pulmonary:      Effort: No respiratory distress.      Breath sounds: No wheezing.   Abdominal:      General: There is no distension.      Tenderness: There is no abdominal tenderness.   Musculoskeletal:      Right lower leg: No edema.      Left lower leg: No edema.   Neurological:      Mental Status: He is alert and oriented to person, place, and time.   Psychiatric:         Mood and Affect: Mood normal.         Behavior: Behavior normal.            Significant Labs: All pertinent lab results from the last 24 hours have been reviewed.    Significant Imaging: Echocardiogram: Transthoracic echo (TTE) complete (Cupid Only):   Results for orders placed or performed during the hospital encounter of 04/22/25   Echo   Result Value Ref Range    BSA 2.09 m2    LVOT stroke volume 104.5 cm3    LVIDd 5.3 3.5 - 6.0 cm    LV Systolic Volume 67 mL    LV Systolic Volume Index 31.9 mL/m2    LVIDs 3.9 2.1 - 4.0 cm    LV ESV A2C 60.27 mL    LV Diastolic Volume 133 mL    LV ESV A4C 44.40 mL    LV Diastolic Volume Index 63.33 mL/m2    Left Ventricular End Systolic Volume by Teichholz Method 66.66 mL    Left Ventricular End Diastolic Volume by Teichholz Method 133.35 mL    IVS 1.0 0.6 - 1.1 cm    LVOT diameter 2.6 cm    LVOT area 5.3 cm2    FS  26.4 (A) 28 - 44 %    Left Ventricle Relative Wall Thickness 0.34 cm    PW 0.9 0.6 - 1.1 cm    LV mass 187.3 g    LV Mass Index 89.2 g/m2    MV Peak E Maxime 0.65 m/s    TDI LATERAL 0.08 m/s    TDI SEPTAL 0.06 m/s    E/E' ratio 9 m/s    MV Peak A Maxime 0.70 m/s    TR Max Maxime 2.8 m/s    E/A ratio 0.93     IVRT 111 msec    E wave deceleration time 236 msec    LV SEPTAL E/E' RATIO 10.8 m/s    LV LATERAL E/E' RATIO 8.1 m/s    PV Peak S Maxime 0.80 m/s    PV Peak D Maxime 0.59 m/s    Pulm vein S/D ratio 1.36     LVOT peak maxime 0.7 m/s    Left Ventricular Outflow Tract Mean Velocity 0.48 cm/s    Left Ventricular Outflow Tract Mean Gradient 1.04 mmHg    RV- cuello basal diam 3.7 cm    RV-cuello mid d 2.6 cm    RV Basal Diameter 6.52 cm    RV-cuello length 6.5 cm    RV mid diameter 2.56 cm    RV S' 13.64 cm/s    TAPSE 2.29 cm    RV/LV Ratio 0.70 cm    LA size 4.3 cm    LA Vol (MOD) 53 mL    RUBEN (MOD) 25 mL/m2    AV regurgitation pressure 1/2 time 678 ms    AR Max Maxime 4.09 m/s    AV mean gradient 44 mmHg    AV peak gradient 71 mmHg    Ao peak maxime 4.2 m/s    Ao .5 cm    LVOT peak VTI 19.7 cm    AV valve area 0.9 cm²    AV Velocity Ratio 0.17     AV index (prosthetic) 0.17     KAMRAN by Velocity Ratio 0.9 cm²    MV stenosis pressure 1/2 time 68.50 ms    MV valve area p 1/2 method 3.21 cm2    Triscuspid Valve Regurgitation Peak Gradient 32 mmHg    Sinus 3.21 cm    STJ 2.76 cm    Ascending aorta 3.27 cm    Mean e' 0.07 m/s    ZLVIDS -0.18     ZLVIDD -2.08     LA area A4C 18.64 cm2    LA area A2C 21.58 cm2    RVDD 3.69 cm    TV resting pulmonary artery pressure 34 mmHg    RV TB RVSP 6 mmHg    Est. RA pres 3 mmHg    Narrative      Left Ventricle: The left ventricle is normal in size. Normal wall   thickness. There is normal systolic function with a visually estimated   ejection fraction of 60 - 65%. There is normal diastolic function.    Right Ventricle: The right ventricle is normal in size. Wall thickness   is normal. Systolic function is  normal.    Aortic Valve: The aortic valve is a trileaflet valve. There is moderate   to severe aortic valve sclerosis. Severely restricted motion. There is   severe stenosis. Aortic valve area by VTI is 0.9 cm². Aortic valve peak   velocity is 4.2 m/s. Mean gradient is 44 mmHg. The dimensionless index is   0.17.    Pulmonary Artery: There is borderline elevated pulmonary hypertension.   The estimated pulmonary artery systolic pressure is 34 mmHg.    IVC/SVC: Normal venous pressure at 3 mmHg.       Assessment and Plan:     Brief HPI: Anaphylaxis & TAVR    * Anaphylaxis  Prior to TAVR procedure, peripheral IV inserted with normal saline injected. Patient then became hypotensive, dyspneic, and diaphoretic, with rasha cross torso. Placed on NRB and given solumedrol, benadryl, and nebs. S/p omar, then started on epi gtt and levo gtt. Patient admitted to the ICU, since improved with ability to wean o2 and pressors. Unknown source, no medication administered prior to reaction. No h/o anaphylaxis, no significant allergies.   - Infectious workup ordered  - Continue Epi, wean as tolerated  - Albuterol nebs scheduled  - Continue to monitor in the ICU    Acute hypoxic respiratory failure  Patient with Hypoxic Respiratory failure which is Acute.  he is not on home oxygen. Supplemental oxygen was provided and noted- Oxygen Concentration (%):  [40-90] 40    .   Signs/symptoms of respiratory failure include- respiratory distress. Contributing diagnoses includes - Aspiration Labs and images were reviewed. Patient Has recent ABG, which has been reviewed.     Plan:  -- HF oxygen, now back on O2 via NC  -- Continue current abx  -- Monitor vitals    Aspiration of stomach contents  The patient underwent TAVR on 05/29/2025. Following the successful procedure, he was intubated and placed on mechanical ventilation due to aspiration. A CT head was obtained to evaluate for possible stroke in the setting of post-procedural dysphagia, imaging  showed no acute intracranial pathology. He was subsequently reassessed by Speech Therapy and cleared for a regular diet with thin liquids. On 06/02/2025, the patient developed sudden-onset acute hypoxic respiratory failure, which improved with high-flow oxygen therapy. 6MWT was performed and indicated a need for supplemental oxygen with exertion.    - intubated and placed on mechanical ventilation for one day  - AC/VC with TV of 500, FiO2 of 40 and PEEP of 5  - failed speech therapy  - continue fluids for hydration and calories  - repeat swallow eval  - Swallow test not performed today d/o his acute hypoxic respiratory failure which is now improved  - Swallow test lio    Aortic stenosis  H/o severe AS, presented for outpatient TAVR. Deferred due to anaphylactic reaction pre-procedure.   - TAVR on 5/29  - aspirin 125 suppository  - benadryl 25mg IV to premedicate  - f/u with interventional cardiology outpatient    Essential hypertension  - Hold antihypertensives     COPD (chronic obstructive pulmonary disease)  - Albuterol nebs scheduled     Hyperlipemia  - Continue statin        VTE Risk Mitigation (From admission, onward)           Ordered     heparin (porcine) injection 5,000 Units  Every 8 hours         05/30/25 1133     IP VTE HIGH RISK PATIENT  Once         05/30/25 1133     Place sequential compression device  Until discontinued         05/30/25 0737     Place sequential compression device  Until discontinued         05/28/25 8263                    Remi Candelaria MD  Cardiology  [facility] - Cardiology Step[facility]

## 2025-06-03 NOTE — PT/OT/SLP PROGRESS
Speech Language Pathology Treatment    Patient Name:  Dutch Olivier   MRN:  [id number]  Admitting Diagnosis: Anaphylaxis    Recommendations:                 General Recommendations:  FEES to rule out aspiration and help determine least restrictive diet, to Remain NPO until assessment completed within the hour   General Precautions: Standard, fall  Communication strategies:  go to room if call light pushed    Assessment:     Dutch Olivier is a 79 y.o. male with dysphagia further complicated by recent event of respiratory distress. Pt  to otherwise remain NPO with ice chips for pleasure.     Subjective     Pt awake/alert   Daughter present  RN in agreement with SLP seeing this date     Pain/Comfort:  Pain Rating 1: 0/10  Pain Rating Post-Intervention 1: 0/10    Respiratory Status: Nasal cannula, flow 3 L/min    Objective:     Has the patient been evaluated by SLP for swallowing?   Yes  Keep patient NPO? No     Pt with significant improvement in overall respiratory status and state compared to previous date. Pt  on significantly less respiratory support and was successfully weaned from airvo to standard nasal cannula and stepped down from ICU. Pt with strong clear vocal quality and no evidence of increased work of breathing.  Pt reports tolerating ice chips and sips of melted ice via teaspoon without difficulty or coughing. SLP observed pt manage ice chips without concerns for airway compromise. At this time SLP speak candidly with Pt and daughter at the bedside re: next steps. SLP discussed able to move forward with additional PO trials or can transition to a FEES to more objective swallow assessment to rule out aspiration given pt acute event previous date. After discussing all risks and benefits pt and daughter reporting interested in moving forward with objective assessment to more definitively determine ongoing safety with PO intake. SLP discussed reasonable plan and will speak with team. Team in agreement with  plan and orders placed. Diet recs to be made post objective swallow assessment which will be completed at the bedside within the hour.       Goals:   Multidisciplinary Problems       SLP Goals          Problem: SLP    Goal Priority Disciplines Outcome   SLP Goal     SLP Progressing   Description: Speech Language Pathology Goals  Goals expected to be met by 6/13    1. Pt will participate in ongoing swallow assessment                          Plan:     Patient to be seen:  4 x/week   Plan of Care expires:  06/27/25  Plan of Care reviewed with:  patient, daughter   SLP Follow-Up:  No       Discharge recommendations:  No Therapy Indicated   Barriers to Discharge:  None    Time Tracking:     SLP Treatment Date:   06/03/25  Speech Start Time:  0829  Speech Stop Time:  0845     Speech Total Time (min):  16 min    Billable Minutes: Treatment Swallowing Dysfunction 8 and Self Care/Home Management Training 8    06/03/2025

## 2025-06-03 NOTE — PROGRESS NOTES
Patient BP was elevated notified MD Mathias, one time dose of IV 5mg  hydralazine ordered.   Administered per order, was told that it is ok as as long as pt BP stays below 170 it is ok. No other orders provided.       06/02/25 2047 06/02/25 2100   Vital Signs   Temp 98.7 °F (37.1 °C)  --    Temp Source Oral  --    Pulse (!) 56 64   Heart Rate Source Monitor  --    Resp 20  --    SpO2 97 %  --    Flow (L/min) (Oxygen Therapy)  --  3   Device (Oxygen Therapy)  --  nasal cannula with humidification   BP (!) 174/76 (!) 183/64   MAP (mmHg) 109 104   BP Location Left arm Right arm   BP Method  --  Automatic   Patient Position Lying Lying

## 2025-06-03 NOTE — PLAN OF CARE
06/03/25 1232   Rounds   Attendance ;Assigned nurse;Charge nurse  (Unit neftali)   Discharge Plan A Home with family   Why the patient remains in the hospital Requires continued medical care   Transition of Care Barriers None

## 2025-06-03 NOTE — PLAN OF CARE
Problem: Adult Inpatient Plan of Care  Goal: Absence of Hospital-Acquired Illness or Injury  Outcome: Progressing  Goal: Optimal Comfort and Wellbeing  Outcome: Progressing  Goal: Readiness for Transition of Care  Outcome: Progressing     Problem: Wound  Goal: Optimal Coping  Outcome: Progressing  Goal: Optimal Functional Ability  Outcome: Progressing  Goal: Absence of Infection Signs and Symptoms  Outcome: Progressing  Goal: Improved Oral Intake  Outcome: Progressing  Goal: Optimal Pain Control and Function  Outcome: Progressing  Goal: Skin Health and Integrity  Outcome: Progressing  Goal: Optimal Wound Healing  Outcome: Progressing     Problem: Infection  Goal: Absence of Infection Signs and Symptoms  Outcome: Progressing     Problem: Skin Injury Risk Increased  Goal: Skin Health and Integrity  Outcome: Progressing     Problem: Delirium  Goal: Optimal Coping  Outcome: Progressing  Goal: Improved Behavioral Control  Outcome: Progressing  Goal: Improved Attention and Thought Clarity  Outcome: Progressing  Goal: Improved Sleep  Outcome: Progressing     Problem: Fall Injury Risk  Goal: Absence of Fall and Fall-Related Injury  Outcome: Progressing

## 2025-06-04 VITALS
WEIGHT: 185 LBS | SYSTOLIC BLOOD PRESSURE: 149 MMHG | RESPIRATION RATE: 14 BRPM | HEIGHT: 74 IN | DIASTOLIC BLOOD PRESSURE: 74 MMHG | OXYGEN SATURATION: 92 % | HEART RATE: 84 BPM | TEMPERATURE: 98 F | BODY MASS INDEX: 23.74 KG/M2

## 2025-06-04 PROBLEM — D64.9 ANEMIA: Status: ACTIVE | Noted: 2025-06-04

## 2025-06-04 PROBLEM — Z74.09 OTHER REDUCED MOBILITY: Status: ACTIVE | Noted: 2025-06-04

## 2025-06-04 LAB
ABSOLUTE EOSINOPHIL (OHS): 0.39 K/UL
ABSOLUTE MONOCYTE (OHS): 1.1 K/UL (ref 0.3–1)
ABSOLUTE NEUTROPHIL COUNT (OHS): 5.43 K/UL (ref 1.8–7.7)
ALBUMIN SERPL BCP-MCNC: 2.5 G/DL (ref 3.5–5.2)
ALP SERPL-CCNC: 56 UNIT/L (ref 40–150)
ALT SERPL W/O P-5'-P-CCNC: 13 UNIT/L (ref 10–44)
ANION GAP (OHS): 7 MMOL/L (ref 8–16)
AST SERPL-CCNC: 13 UNIT/L (ref 11–45)
BASOPHILS # BLD AUTO: 0.06 K/UL
BASOPHILS NFR BLD AUTO: 0.6 %
BILIRUB SERPL-MCNC: 0.4 MG/DL (ref 0.1–1)
BUN SERPL-MCNC: 11 MG/DL (ref 8–23)
CALCIUM SERPL-MCNC: 8.3 MG/DL (ref 8.7–10.5)
CHLORIDE SERPL-SCNC: 110 MMOL/L (ref 95–110)
CO2 SERPL-SCNC: 24 MMOL/L (ref 23–29)
CREAT SERPL-MCNC: 0.7 MG/DL (ref 0.5–1.4)
ERYTHROCYTE [DISTWIDTH] IN BLOOD BY AUTOMATED COUNT: 13.2 % (ref 11.5–14.5)
GFR SERPLBLD CREATININE-BSD FMLA CKD-EPI: >60 ML/MIN/1.73/M2
GLUCOSE SERPL-MCNC: 115 MG/DL (ref 70–110)
HCT VFR BLD AUTO: 28.6 % (ref 40–54)
HGB BLD-MCNC: 9.5 GM/DL (ref 14–18)
IMM GRANULOCYTES # BLD AUTO: 0.07 K/UL (ref 0–0.04)
IMM GRANULOCYTES NFR BLD AUTO: 0.7 % (ref 0–0.5)
LYMPHOCYTES # BLD AUTO: 2.45 K/UL (ref 1–4.8)
MAGNESIUM SERPL-MCNC: 2 MG/DL (ref 1.6–2.6)
MCH RBC QN AUTO: 30.3 PG (ref 27–31)
MCHC RBC AUTO-ENTMCNC: 33.2 G/DL (ref 32–36)
MCV RBC AUTO: 91 FL (ref 82–98)
NUCLEATED RBC (/100WBC) (OHS): 0 /100 WBC
PLATELET # BLD AUTO: 208 K/UL (ref 150–450)
PMV BLD AUTO: 9.9 FL (ref 9.2–12.9)
POTASSIUM SERPL-SCNC: 3.6 MMOL/L (ref 3.5–5.1)
PROT SERPL-MCNC: 5.6 GM/DL (ref 6–8.4)
RBC # BLD AUTO: 3.14 M/UL (ref 4.6–6.2)
RELATIVE EOSINOPHIL (OHS): 4.1 %
RELATIVE LYMPHOCYTE (OHS): 25.8 % (ref 18–48)
RELATIVE MONOCYTE (OHS): 11.6 % (ref 4–15)
RELATIVE NEUTROPHIL (OHS): 57.2 % (ref 38–73)
SODIUM SERPL-SCNC: 141 MMOL/L (ref 136–145)
WBC # BLD AUTO: 9.5 K/UL (ref 3.9–12.7)

## 2025-06-04 PROCEDURE — 36415 COLL VENOUS BLD VENIPUNCTURE: CPT

## 2025-06-04 PROCEDURE — 63600175 PHARM REV CODE 636 W HCPCS: Performed by: INTERNAL MEDICINE

## 2025-06-04 PROCEDURE — 94761 N-INVAS EAR/PLS OXIMETRY MLT: CPT

## 2025-06-04 PROCEDURE — 25000003 PHARM REV CODE 250: Performed by: INTERNAL MEDICINE

## 2025-06-04 PROCEDURE — 25000242 PHARM REV CODE 250 ALT 637 W/ HCPCS

## 2025-06-04 PROCEDURE — 97530 THERAPEUTIC ACTIVITIES: CPT | Mod: CQ

## 2025-06-04 PROCEDURE — 97116 GAIT TRAINING THERAPY: CPT | Mod: CQ

## 2025-06-04 PROCEDURE — 94640 AIRWAY INHALATION TREATMENT: CPT

## 2025-06-04 PROCEDURE — 25000003 PHARM REV CODE 250

## 2025-06-04 PROCEDURE — 80053 COMPREHEN METABOLIC PANEL: CPT

## 2025-06-04 PROCEDURE — 85025 COMPLETE CBC W/AUTO DIFF WBC: CPT

## 2025-06-04 PROCEDURE — 25000003 PHARM REV CODE 250: Performed by: STUDENT IN AN ORGANIZED HEALTH CARE EDUCATION/TRAINING PROGRAM

## 2025-06-04 PROCEDURE — 83735 ASSAY OF MAGNESIUM: CPT

## 2025-06-04 PROCEDURE — 99900035 HC TECH TIME PER 15 MIN (STAT)

## 2025-06-04 RX ORDER — ATORVASTATIN CALCIUM 80 MG/1
80 TABLET, FILM COATED ORAL NIGHTLY
Qty: 90 TABLET | Refills: 3 | Status: SHIPPED | OUTPATIENT
Start: 2025-06-04 | End: 2026-06-04

## 2025-06-04 RX ORDER — POTASSIUM CHLORIDE 750 MG/1
50 CAPSULE, EXTENDED RELEASE ORAL ONCE
Status: DISCONTINUED | OUTPATIENT
Start: 2025-06-04 | End: 2025-06-04 | Stop reason: HOSPADM

## 2025-06-04 RX ADMIN — PRAVASTATIN SODIUM 40 MG: 40 TABLET ORAL at 08:06

## 2025-06-04 RX ADMIN — AMPICILLIN SODIUM AND SULBACTAM SODIUM 3 G: 2; 1 INJECTION, POWDER, FOR SOLUTION INTRAMUSCULAR; INTRAVENOUS at 05:06

## 2025-06-04 RX ADMIN — IPRATROPIUM BROMIDE AND ALBUTEROL SULFATE 3 ML: 2.5; .5 SOLUTION RESPIRATORY (INHALATION) at 12:06

## 2025-06-04 RX ADMIN — IPRATROPIUM BROMIDE AND ALBUTEROL SULFATE 3 ML: 2.5; .5 SOLUTION RESPIRATORY (INHALATION) at 04:06

## 2025-06-04 RX ADMIN — AMPICILLIN SODIUM AND SULBACTAM SODIUM 3 G: 2; 1 INJECTION, POWDER, FOR SOLUTION INTRAMUSCULAR; INTRAVENOUS at 01:06

## 2025-06-04 RX ADMIN — HYDROCHLOROTHIAZIDE 12.5 MG: 12.5 TABLET ORAL at 08:06

## 2025-06-04 RX ADMIN — IPRATROPIUM BROMIDE AND ALBUTEROL SULFATE 3 ML: 2.5; .5 SOLUTION RESPIRATORY (INHALATION) at 07:06

## 2025-06-04 RX ADMIN — BICALUTAMIDE 50 MG: 50 TABLET ORAL at 08:06

## 2025-06-04 RX ADMIN — CLOPIDOGREL BISULFATE 75 MG: 75 TABLET, FILM COATED ORAL at 08:06

## 2025-06-04 RX ADMIN — LISINOPRIL 40 MG: 20 TABLET ORAL at 08:06

## 2025-06-04 NOTE — PT/OT/SLP PROGRESS
Physical Therapy Treatment    Patient Name:  Dutch Olivier   MRN:  [id number]    Recommendations:     Discharge Recommendations: Low Intensity Therapy  Discharge Equipment Recommendations: bedside commode  Barriers to discharge: None    Assessment:     Dutch Olivier is a 79 y.o. male admitted with a medical diagnosis of Anaphylaxis.  He presents with the following impairments/functional limitations: impaired endurance, weakness, impaired self care skills, impaired functional mobility, gait instability, impaired balance, decreased lower extremity function, decreased upper extremity function. Pt found in bed. Expressed desire to get dressed into regular clothing. PTA assisted pt in both standing and seated positions for dressing. Increased posterior trunk lean during dynamic seated, however, no LOB. Introduction of quad cane today as well with improved body mechanics as compared to ambulation with RW. Additional focus on stairs to prepare pt for home functional mobility 2/2 DC orders in place and discharge location having stairs to enter. Pt able to complete 2 trials with minimal cues using step-to side stepping technique with use of BUE on handrail. Pt would benefit from continued PT to improve functional independence.      Rehab Prognosis: Good; patient would benefit from acute skilled PT services to address these deficits and reach maximum level of function.    Recent Surgery: Procedure(s) (LRB):  REPLACEMENT, AORTIC VALVE, TRANSCATHETER (TAVR) (N/A)  Cardiac Cath [place]geon (N/A)  REPLACEMENT, AORTIC VALVE, TRANSCATHETER (TAVR)  PTA, Iliac Artery 6 Days Post-Op    Plan:     During this hospitalization, patient to be seen 4 x/week to address the identified rehab impairments via therapeutic exercises, therapeutic activities, gait training, neuromuscular re-education and progress toward the following goals:    Plan of Care Expires:  06/30/25    Subjective     Chief Complaint: none stated  Patient/Family  Comments/goals: excited to be getting discharged  Pain/Comfort:  Pain Rating 1: 0/10      Objective:     Communicated with nurse prior to session.  Patient found HOB elevated with telemetry upon PT entry to room.     General Precautions: Standard, fall  Orthopedic Precautions: N/A  Braces: N/A  Respiratory Status: Room air     Functional Mobility:  Bed Mobility:     Supine <> Sit: supervision  Transfers:    Sit <> stand: SBA  Balance: static seated balance: independent  Dynamic seated balance: SBA to min A  Adorning jeans. Assist to pant leg 2/2 hospital socks sticking to pant leg  Static standing balance: CGA  Dynamic standing balance: CGA to min A. Telemetry management  Adorning polo shirt, micturition in bedside urinal   Gait: 2 trails: 50ft with quad cane CGA to SBA; 10 with no AD CGA  Deviations: downward gaze  Gait pattern observed: step-to with QC  Increased assist with increased fatigue  Stairs: 2 trials: ascending/descending 3 stairs R HR CGA  Increased anterior trunk lean, increased downward gaze    AM-PAC 6 CLICK MOBILITY  Turning over in bed (including adjusting bedclothes, sheets and blankets)?: 4  Sitting down on and standing up from a chair with arms (e.g., wheelchair, bedside commode, etc.): 3  Moving from lying on back to sitting on the side of the bed?: 4  Moving to and from a bed to a chair (including a wheelchair)?: 3  Need to walk in hospital room?: 3  Climbing 3-5 steps with a railing?: 3  Basic Mobility Total Score: 20       Treatment & Education:  Therapist provided instruction and educated for safety during transfers and gait training. As well as proper body mechanics, energy conservation, and fall prevention strategies during tasks listed above, and the effects of prolonged immobility and the importance of performing EOB/OOB activity and exercises to promote healing and reduce recovery time.   Patient and family educated on indications of utilizing AD (QC) for improved stability with  ambulation.   Patient verbalized understanding and all questions regarding the above were answered.      Patient left up in chair with all lines intact, call button in reach, and daughter present..    GOALS:   Multidisciplinary Problems       Physical Therapy Goals          Problem: Physical Therapy    Goal Priority Disciplines Outcome Interventions   Physical Therapy Goal     PT, PT/OT Progressing    Description: Goals to be met by: 25     Patient will increase functional independence with mobility by performin. Supine to sit with Stand-by Assistance  2. Sit to stand transfer with Supervision  3. Bed to chair transfer with Supervision using LRAD  4. Gait  x 100 feet with Stand-by Assistance using LRAD.   5. Ascend/descend 3 stair with bilateral Handrails Contact Guard Assistance using LRAD.   6. Stand for 5 minutes with Stand-by Assistance using LRAD                         Time Tracking:     PT Received On: 25  PT Start Time: 913     PT Stop Time: 934  PT Total Time (min): 21 min     Billable Minutes: Therapeutic Activity 21    Treatment Type: Treatment  PT/PTA: PTA     Number of PTA visits since last PT visit: 2     2025

## 2025-06-04 NOTE — CARE UPDATE
I have reviewed the chart of Dutch Olivier who is hospitalized for the following:    Active Hospital Problems    Diagnosis    *Anaphylaxis    Other reduced mobility     PT OT consulted, low intensity therapy recs      Anemia     Stable, Continue to monitor       History of transcatheter aortic valve replacement (TAVR)    Acute hypoxic respiratory failure    Tobacco dependency    Aspiration of stomach contents    Aortic stenosis    Prostate cancer metastatic to bone    Essential hypertension    Hyperlipemia    COPD (chronic obstructive pulmonary disease)        Mariluz Veliz PAConradoC  Unit Based [facility]

## 2025-06-04 NOTE — PLAN OF CARE
Future Appointments   Date Time Provider Department Center   6/11/2025 10:00 AM Kalyan Felix, LIONEL [facility] CARDIO [place]   6/13/2025 11:00 AM ECHO, [facility] NOM ECHOSTR [facility]   6/13/2025 12:10 PM LAB, APPOINTMENT [facility] LAB [facility]   6/13/2025  1:00 PM Deb Jernigan, ALFRED [facility] CARDVAL [facility]   10/27/2025 11:00 AM [facility] LABORATORY [facility] LAB [facility]   11/3/2025  9:30 AM EZ Kwan MD [facility] UROLOGY [place]   11/3/2025 10:30 AM INJECTION SCHEDULE, [facility] INF[facility]ION [facility]   11/3/2025 11:00 AM Genaro Carreno MD [facility] CARDIO [place]   11/5/2025 10:00 AM David Arreola MD [facility] MED [facility]     Gen Cards appointment scheduled.        Mraah Jorgensen, RIOSW, RSW, BSW  Case Management  [phone]

## 2025-06-04 NOTE — PLAN OF CARE
Problem: Adult Inpatient Plan of Care  Goal: Plan of Care Review  Outcome: Met  Goal: Patient-Specific Goal (Individualized)  Outcome: Met  Goal: Absence of Hospital-Acquired Illness or Injury  Outcome: Met  Goal: Optimal Comfort and Wellbeing  Outcome: Met  Goal: Readiness for Transition of Care  Outcome: Met     Problem: Wound  Goal: Optimal Coping  Outcome: Met  Goal: Optimal Functional Ability  Outcome: Met  Goal: Absence of Infection Signs and Symptoms  Outcome: Met  Goal: Improved Oral Intake  Outcome: Met  Goal: Optimal Pain Control and Function  Outcome: Met  Goal: Skin Health and Integrity  Outcome: Met  Goal: Optimal Wound Healing  Outcome: MetPt discharged per MD orders.  Tele discontinued and returned to station.  IV discontinued; catheter tip intact x.  Medication list and prescriptions reviewed; prescriptions sent to pt preferred pharmacy and printed prescriptions provided.  Pt verbalizes understanding of all written and verbal discharge instructions.  Pt awaiting family/escort arrival.  Will continue to monitor.      Problem: Infection  Goal: Absence of Infection Signs and Symptoms  Outcome: Met     Problem: Skin Injury Risk Increased  Goal: Skin Health and Integrity  Outcome: Met     Problem: Delirium  Goal: Optimal Coping  Outcome: Met  Goal: Improved Behavioral Control  Outcome: Met  Goal: Improved Attention and Thought Clarity  Outcome: Met  Goal: Improved Sleep  Outcome: Met     Problem: Fall Injury Risk  Goal: Absence of Fall and Fall-Related Injury  Outcome: Met

## 2025-06-04 NOTE — PLAN OF CARE
[facility] - Cardiology Stepdown  Discharge Final Note    Primary Care Provider: David Arreola MD    Expected Discharge Date: 6/4/2025    Patient discharged to home via family personal transportation.     Patient's bedside nurse and family notified of the above.    Patient will discharge with home 02 provided by [facility].     Discharge Plan A and Plan B have been determined by review of patient's clinical status, future medical and therapeutic needs, and coverage/benefits for post-acute care in coordination with multidisciplinary team members.        Final Discharge Note (most recent)       Final Note - 06/04/25 0929          Final Note    Assessment Type Final Discharge Note (P)      Anticipated Discharge Disposition Home or Self Care (P)      Hospital Resources/Appts/Education Provided Provided patient/caregiver with written discharge plan information;Appointments scheduled and added to AVS (P)         Post-Acute Status    Discharge Delays None known at this time (P)                      Important Message from Medicare                 Future Appointments   Date Time Provider Department Center   6/13/2025 11:00 AM [facility] NOM ECHOSTR [facility]   6/13/2025 12:10 PM LAB, APPOINTMENT [place] NOM LAB [facility]   6/13/2025  1:00 PM Deb Jernigan, ALFRED [facility] CARDVAL [facility]   10/27/2025 11:00 AM [facility] LABORATORY [facility] LAB [facility]   11/3/2025  9:30 AM EZ Kwan MD [facility] UROLOGY [place]   11/3/2025 10:30 AM INJECTION SCHEDULE, [facility] INFUSION [facility]F [facility]   11/3/2025 11:00 AM Genaro Carreno MD [facility] CARDIO [place]   11/5/2025 10:00 AM David Arreola MD [facility] MED [facility] scheduled post-discharge follow-up appointment and information added to AVS.

## 2025-06-05 ENCOUNTER — PATIENT OUTREACH (OUTPATIENT)
Dept: ADMINISTRATIVE | Facility: CLINIC | Age: 80
End: 2025-06-05
Payer: MEDICARE

## 2025-06-10 ENCOUNTER — OFFICE VISIT (OUTPATIENT)
Dept: HOME HEALTH SERVICES | Facility: CLINIC | Age: 80
End: 2025-06-10
Payer: MEDICARE

## 2025-06-10 DIAGNOSIS — I35.0 NONRHEUMATIC AORTIC VALVE STENOSIS: ICD-10-CM

## 2025-06-10 DIAGNOSIS — E78.2 MIXED HYPERLIPIDEMIA: ICD-10-CM

## 2025-06-10 DIAGNOSIS — I27.20 PULMONARY HYPERTENSION: ICD-10-CM

## 2025-06-10 DIAGNOSIS — T78.2XXS ANAPHYLAXIS, SEQUELA: ICD-10-CM

## 2025-06-10 DIAGNOSIS — C61 PROSTATE CANCER: ICD-10-CM

## 2025-06-10 DIAGNOSIS — F17.211 TOBACCO DEPENDENCE DUE TO CIGARETTES, IN REMISSION: Primary | ICD-10-CM

## 2025-06-10 DIAGNOSIS — Z95.2 HISTORY OF TRANSCATHETER AORTIC VALVE REPLACEMENT (TAVR): ICD-10-CM

## 2025-06-11 ENCOUNTER — OFFICE VISIT (OUTPATIENT)
Dept: CARDIOLOGY | Facility: CLINIC | Age: 80
End: 2025-06-11
Payer: MEDICARE

## 2025-06-11 VITALS
HEART RATE: 68 BPM | DIASTOLIC BLOOD PRESSURE: 68 MMHG | WEIGHT: 185 LBS | SYSTOLIC BLOOD PRESSURE: 110 MMHG | RESPIRATION RATE: 20 BRPM | BODY MASS INDEX: 23.74 KG/M2 | TEMPERATURE: 98 F | OXYGEN SATURATION: 98 %

## 2025-06-11 VITALS
HEART RATE: 57 BPM | WEIGHT: 178.81 LBS | SYSTOLIC BLOOD PRESSURE: 107 MMHG | BODY MASS INDEX: 22.95 KG/M2 | DIASTOLIC BLOOD PRESSURE: 60 MMHG | HEIGHT: 74 IN

## 2025-06-11 DIAGNOSIS — I25.10 CORONARY ARTERY DISEASE INVOLVING NATIVE CORONARY ARTERY OF NATIVE HEART WITHOUT ANGINA PECTORIS: ICD-10-CM

## 2025-06-11 DIAGNOSIS — I73.9 PAD (PERIPHERAL ARTERY DISEASE): ICD-10-CM

## 2025-06-11 DIAGNOSIS — E78.2 MIXED HYPERLIPIDEMIA: ICD-10-CM

## 2025-06-11 DIAGNOSIS — I10 ESSENTIAL HYPERTENSION: ICD-10-CM

## 2025-06-11 DIAGNOSIS — I35.0 NONRHEUMATIC AORTIC VALVE STENOSIS: Primary | ICD-10-CM

## 2025-06-11 DIAGNOSIS — I70.8 AORTO-ILIAC ATHEROSCLEROSIS: ICD-10-CM

## 2025-06-11 DIAGNOSIS — I70.0 AORTO-ILIAC ATHEROSCLEROSIS: ICD-10-CM

## 2025-06-11 DIAGNOSIS — F17.211 TOBACCO DEPENDENCE DUE TO CIGARETTES, IN REMISSION: ICD-10-CM

## 2025-06-11 DIAGNOSIS — Z95.2 HISTORY OF TRANSCATHETER AORTIC VALVE REPLACEMENT (TAVR): ICD-10-CM

## 2025-06-11 PROBLEM — F17.200 TOBACCO DEPENDENCY: Status: RESOLVED | Noted: 2025-06-02 | Resolved: 2025-06-11

## 2025-06-11 PROBLEM — R01.1 HEART MURMUR: Status: RESOLVED | Noted: 2022-08-17 | Resolved: 2025-06-11

## 2025-06-11 PROBLEM — I35.8 AORTIC VALVE SCLEROSIS: Status: RESOLVED | Noted: 2022-08-17 | Resolved: 2025-06-11

## 2025-06-11 LAB
OHS QRS DURATION: 94 MS
OHS QTC CALCULATION: 426 MS

## 2025-06-11 PROCEDURE — 99999 PR PBB SHADOW E&M-EST. PATIENT-LVL III: CPT | Mod: PBBFAC,,,

## 2025-06-11 PROCEDURE — 93005 ELECTROCARDIOGRAM TRACING: CPT | Mod: PO

## 2025-06-11 PROCEDURE — 93010 ELECTROCARDIOGRAM REPORT: CPT | Mod: S$GLB,,, | Performed by: INTERNAL MEDICINE

## 2025-06-11 NOTE — PROGRESS NOTES
Subjective:    Patient ID:  Dutch Olivier is a 79 y.o. male patient here for evaluation Hospital Follow Up    History of Present Illness:     Dutch Olivier is a 79 y.o. male who follows with Dr. Carreno here today for hospital follow up, 1-week post-TAVR. Last seen in clinic 5/2025.     [place] [facility]. Unfortunately, developed anaphylaxis during pre-op after IV started and normal saline injected. Stabilized in ICU. Underwent successful TAVR (pre-TAVR PTA via shockwave to left CFA, EIA, and SILVESTRE) on 5/29. Post-TAVR echo stable. Post-op aspiration s/p intubation, stabilized, discharged in stable condition.     Today, he reports chronic HATCH, improved since discharge. Occasional orthostatic dizziness (chronic). Denies CP, SOB at rest, palpitations, fatigue, activity intolerance. Feels better overall since discharge.      /60. Reports lower BP readings since TAVR, primarily in 100s.     EKG today: SB 54. QRS 94 ms.     Focused Active Problem List includes:  Severe symptomatic AS s/p TAVR - see HPI  PAD s/p PTA - see HPI  Nonobstructive CAD (Angiogram 5/2025)  Hypertension   Hyperlipidemia   Prostate cancer with bone mets  Recently quit smoking on 5/28/2025      Most Recent Echocardiogram Results  Results for orders placed during the hospital encounter of 04/22/25    Echo    Interpretation Summary    Left Ventricle: The left ventricle is normal in size. Normal wall thickness. There is normal systolic function with a visually estimated ejection fraction of 60 - 65%. There is normal diastolic function.    Right Ventricle: The right ventricle is normal in size. Wall thickness is normal. Systolic function is normal.    Aortic Valve: The aortic valve is a trileaflet valve. There is moderate to severe aortic valve sclerosis. Severely restricted motion. There is severe stenosis. Aortic valve area by VTI is 0.9 cm². Aortic valve peak velocity is 4.2 m/s. Mean gradient is 44 mmHg. The  dimensionless index is 0.17.    Pulmonary Artery: There is borderline elevated pulmonary hypertension. The estimated pulmonary artery systolic pressure is 34 mmHg.    IVC/SVC: Normal venous pressure at 3 mmHg.      Most Recent Nuclear Stress Test Results  Results for orders placed during the hospital encounter of 01/18/23    Nuclear Stress - Cardiology Interpreted    Interpretation Summary    Normal myocardial perfusion scan. There is no evidence of myocardial ischemia or infarction.    There is a  mild intensity fixed perfusion abnormality in the  wall of the left ventricle secondary to diaphragm attenuation.    There is mild to moderate apical thinning which is a normal variant.    The gated perfusion images showed an ejection fraction of 68% post stress.    There is normal wall motion post stress.    LV cavity size is normal at rest and normal at stress.    The ECG portion of the study is negative for ischemia.    The patient reported no chest pain during the stress test.    Low risk study for ischemia.      Most Recent Cardiac PET Stress Test Results  No results found for this or any previous visit.      Most Recent Cardiovascular Angiogram results  Results for orders placed during the hospital encounter of 05/28/25    Cardiac catheterization    Narrative  Procedure performed in the Invasive Lab  - See Procedure Log link below for nursing documentation  - See OpNote on Surgeries Tab for physician findings  - See Imaging Tab for radiologist dictation      Other Most Recent Cardiology Results  Results for orders placed during the hospital encounter of 05/28/25    Cardiac monitoring strips      REVIEW OF SYSTEMS: As noted in HPI   CARDIOVASCULAR: No recent chest pain, palpitations, arm/neck/jaw pain, or edema.  RESPIRATORY: See HPI  : No blood in the urine  GI: No reflux, nausea, vomiting, or blood in stool.   MUSCULOSKELETAL: No falls.   NEURO: No headaches, syncope, or dizziness.  EYES: No sudden changes in  vision.     Past Medical History:   Diagnosis Date    Alcohol abuse, daily use     8/2024 stopped    Amblyopia     OS    Aortic valve stenosis     Cancer     melanoma, prostate    Cataract     Complication of anesthesia     says he sometimes is slow to awaken    COPD (chronic obstructive pulmonary disease)     no oxygen, no inhalers, or nebulizers    Diverticulosis     Gross hematuria     History of colonic polyps     History of malignant melanoma 01/01/2011    right ear, had chemo//Dr Bessett    HTN (hypertension)     Hyperlipemia     Kidney stone      Past Surgical History:   Procedure Laterality Date    CARDIAC CATH COSURGEON N/A 5/29/2025    Procedure: Cardiac Cath Cosurgeon;  Surgeon: Jose Antonio Langston MD;  Location: [facility] CATH LAB;  Service: Cardiothoracic;  Laterality: N/A;    CORONARY ANGIOGRAPHY N/A 5/16/2025    Procedure: ANGIOGRAM, CORONARY ARTERY;  Surgeon: Gume Blood MD;  Location: [facility] CATH LAB;  Service: Cardiology;  Laterality: N/A;    cystolithopaxy      CYSTOSCOPIC LITHOLAPAXY N/A 3/25/2025    Procedure: CYSTOLITHOLAPAXY - with Thulium Laser - Bladder Stone;  Surgeon: EZ Kwan MD;  Location: [facility];  Service: Urology;  Laterality: N/A;    EXTERNAL EAR SURGERY  01/01/2011    melanoma removed right ear    HERNIA REPAIR      RIH    PORTACATH PLACEMENT  01/01/2011    later removed    PROSTATE SURGERY  01/01/2003    prostatectomy    PTA, ILIAC ARTERY  5/29/2025    Procedure: PTA, Iliac Artery;  Surgeon: Gume Blood MD;  Location: [facility] CATH LAB;  Service: Cardiology;;  SHOCKWAVE    TRANSCATHETER AORTIC VALVE REPLACEMENT (TAVR) N/A 5/29/2025    Procedure: REPLACEMENT, AORTIC VALVE, TRANSCATHETER (TAVR);  Surgeon: Gume Blood MD;  Location: [facility] CATH LAB;  Service: Cardiology;  Laterality: N/A;    TRANSCATHETER AORTIC VALVE REPLACEMENT (TAVR)  5/29/2025    Procedure: REPLACEMENT, AORTIC VALVE, TRANSCATHETER (TAVR);  Surgeon: Jose Antonio Langston MD;  Location: [facility] CATH LAB;   Service: Cardiothoracic;;     Social History[1]      Objective      Vitals:    06/11/25 0953   BP: 107/60   Pulse: (!) 57       The ASCVD Risk score (Israel GARCIA, et al., 2019) failed to calculate for the following reasons:    The 2019 ASCVD risk score is only valid for ages 40 to 79      LAST EKG  Results for orders placed or performed during the hospital encounter of 05/28/25   EKG 12-lead    Collection Time: 05/30/25  7:14 AM   Result Value Ref Range    QRS Duration 90 ms    [facility] QTC Calculation 410 ms    Narrative    Test Reason :    Vent. Rate :  57 BPM     Atrial Rate :  57 BPM     P-R Int : 154 ms          QRS Dur :  90 ms      QT Int : 422 ms       P-R-T Axes :  72  65  74 degrees    QTcB Int : 410 ms    Sinus bradycardia  Otherwise normal ECG  When compared with ECG of 29-May-2025 21:59,  Artifact has lessened  Confirmed by Mario Israel ([id number]) on 5/30/2025 8:57:27 AM    Referred By: MARCIE LOPEZ           Confirmed By: Mario Israel     LIPIDS - LAST 2   Lab Results   Component Value Date    CHOL 180 10/08/2024    CHOL 185 09/13/2023    HDL 40 10/08/2024    HDL 51 09/13/2023    LDLCALC 96.2 10/08/2024    LDLCALC 94.2 09/13/2023    TRIG 219 (H) 10/08/2024    TRIG 199 (H) 09/13/2023    CHOLHDL 22.2 10/08/2024    CHOLHDL 27.6 09/13/2023     CARDIAC PROFILE - LAST 2  Lab Results   Component Value Date     (H) 05/29/2025     (H) 05/29/2025     08/23/2019     08/22/2018      CBC - LAST 2  Lab Results   Component Value Date    WBC 9.50 06/04/2025    WBC 10.26 06/03/2025    HGB 9.5 (L) 06/04/2025    HGB 9.1 (L) 06/03/2025    HCT 28.6 (L) 06/04/2025    HCT 28.3 (L) 06/03/2025     06/04/2025     06/03/2025     Lab Results   Component Value Date    LABPT 13.1 03/20/2025    INR 1.0 03/20/2025    APTT 31.4 03/20/2025     CHEMISTRY - LAST 2  Lab Results   Component Value Date     06/04/2025     (H) 06/03/2025    K 3.6 06/04/2025    K 3.3 (L) 06/03/2025    CO2 24 06/04/2025     CO2 20 (L) 06/03/2025    BUN 11 06/04/2025    BUN 16 06/03/2025    CREATININE 0.7 06/04/2025    CREATININE 0.7 06/03/2025     (H) 06/04/2025    GLU 96 06/03/2025    CALCIUM 8.3 (L) 06/04/2025    CALCIUM 8.3 (L) 06/03/2025    PH 7.470 (H) 06/02/2025    PH 7.426 05/30/2025    MG 2.0 06/04/2025    MG 2.1 06/03/2025    ALBUMIN 2.5 (L) 06/04/2025    ALBUMIN 2.8 (L) 06/03/2025    ALT 13 06/04/2025    ALT 14 06/03/2025    AST 13 06/04/2025    AST 15 06/03/2025      ENDOCRINE - LAST 2  Lab Results   Component Value Date    HGBA1C 5.1 10/08/2024    HGBA1C 4.9 08/09/2022    TSH 0.721 09/13/2023    TSH 1.005 08/09/2022        PHYSICAL EXAM  CONSTITUTIONAL: Well built, well nourished in no apparent distress  NECK: no carotid bruit, no JVD  LUNGS: CTA  CHEST WALL: no tenderness  HEART: regular rate and rhythm, S1, S2 normal, no murmur, click, rub or gallop   ABDOMEN: soft, non-tender; bowel sounds normal; no masses,  no organomegaly  EXTREMITIES: Extremities normal, no edema, no calf tenderness noted  NEURO: AAO X 3    I HAVE REVIEWED :    The vital signs, most recent cardiac testing, and most recent pertinent non-cardiology provider notes.    Current Outpatient Medications   Medication Instructions    atorvastatin (LIPITOR) 80 mg, Oral, Nightly    b complex vitamins tablet 1 tablet, Daily    benazepriL (LOTENSIN) 20 mg, Oral, 2 times daily    bicalutamide (CASODEX) 50 mg, Oral, Daily    clopidogreL (PLAVIX) 75 mg, Oral, Daily    hydroCHLOROthiazide 12.5 mg, Oral, Every morning    multivitamin capsule 1 capsule, Daily        Assessment     Severe symptomatic AS s/p TAVR - see HPI  PAD s/p PTA - see HPI  Nonobstructive CAD (Angiogram 5/2025)  Hypertension   Hyperlipidemia   Prostate cancer with bone mets  Recently quit smoking on 5/28/2025       Plan to address above diagnoses:    -Stop HCTZ   -Follow up with Dr. Lang for post-TAVR echocardiogram    Continue atorvastatin 80 mg daily  Continue benazepril 20 mg  BID  Continue clopidogrel 75 mg daily      I emphasized the importance of modifying lifestyle related risk factors including tobacco avoidance, limiting alcohol intake, aerobic exercise, weight management and Mediterranean diet.      Follow up in about 6 months (around 12/11/2025).     Corey Darvill, NP [facility]   Office: [phone]       [1]   Social History  Tobacco Use    Smoking status: Former     Current packs/day: 1.00     Average packs/day: 1.0 packs/day     Types: Cigarettes     Start date: 5/28/2025     Passive exposure: Current    Smokeless tobacco: Never   Vaping Use    Vaping status: Never Used   Substance Use Topics    Alcohol use: Not Currently     Comment: 6-7 nightly for yrs/ stopped 2024    Drug use: No

## 2025-06-11 NOTE — PROGRESS NOTES
[facility]  Transitional Care Management (TCM) Home Visit    Encounter Provider: Stacie Quiñones   PCP: David Arreola MD  Consult Requested By: No ref. provider found  Admit Date: 5/28/25   IP Discharge Date: 6/4/25  Hospital Length of Stay:RRHLOS@ 7 days  Days since discharge (from IP or SNF): 6 days   [facility] Contact Note: 6/5/25  Hospital Diagnosis: S/p TAVR, anaphylaxis    HISTORY OF PRESENT ILLNESS      Patient ID: Dutch Olivier is a 79 y.o. male was recently admitted to the hospital, this is their TCM encounter.    Hospital Course Synopsis:  Admit: 5/28/25  Discharge: 6/4/25  HPI:   Dutch Olivier is a 80 yo M with a history of nonobstructive CAD, COPD, HLD, HTN, prostate ca w bone mets, and severe AS who presented for outpatient TAVR. Admitted to CCU for acute anaphylaxis pre-procedure.      Patient presented today for outpatient TAVR. Prior to the procedure, he has a peripheral IV inserted with normal saline injected. He then became acutely dyspneic, tachycardic, and diaphoretic. Diffuse rash ntoed across torso. He became unresponsive, though did not lose a pulse. He was placed on NRB. Given Cesar pushes x3, Epi gtt started. Given solumedrol, benadryl, and albuterol nebs. Concern for possible HF contributing and given Lasix. Levo was also added on prior to transfer to the ICU.      On evaluation in the ICU, patient on Epi and Levo with MAPs >65. Sating well on 6L NC. CVL and ART line placed bedside. He reports feeling well at this time, denying chest pain, dyspnea, pre-syncope. Gtts since weaned with Levo discontinued and Epi 0.1. Sating well on 3L NC.      Procedure(s) (LRB):  REPLACEMENT, AORTIC VALVE, TRANSCATHETER (TAVR) (N/A)  Cardiac Cath [place] (N/A)  REPLACEMENT, AORTIC VALVE, TRANSCATHETER (TAVR)  PTA, Iliac Artery      Indwelling Lines/Drains at time of discharge:  Lines/Drains/Airways         Drain  Duration                Male External Urinary Catheter 06/01/25 1800  Small 1 day                          Hospital Course:  Patient admitted to the CCU d/o anaphylaxis treated with epinephrine and levophed. The patient underwent TAVR on 05/29/2025. Following the successful procedure, he was intubated and placed on mechanical ventilation due to aspiration. A CT head was obtained to evaluate for possible stroke in the setting of post-procedural dysphagia, imaging showed no acute intracranial pathology. He was subsequently reassessed by Speech Therapy and cleared for a regular diet with thin liquids. On 06/02/2025, the patient developed sudden-onset acute hypoxic respiratory failure, which improved with high-flow oxygen therapy. 6MWT was performed and indicated a need for supplemental oxygen with exertion.        Mr. Olivier was discharged today on Clopidogrel, his chronic medications, and home oxygen. Follow up with primary Cardiologist.    With this [facility] hospital follow up visit patient greets provider at door. He walks unassisted, has mild dyspnea with ambulation. Denies any chest pain, dyspnea at rest. States he checked his pulse after walking to the mailbox and it was 120 BPM but came down with rest. Heart rate today is 68 BPM. He endorses taking plavix as prescribed. On medication reconciliation he is taking pravastatin 40 mg as well as new med atorvastatin 80mg. Instructed to hold atorvastatin until cardiology follow up tomorrow.  Endorses he has not smoked any cigarettes since hospital admission. Encouraged to remain a non-smoker.    Still with rash on back that is pruritic. Using topical OTC creams.    No distress noted. Program contact information provided to patient and left in home.      DECISION MAKING TODAY       Assessment & Plan:  1. 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  Reports cravings but indicates he will not smoke, doesn't want nicotine  patches.  Encouraged to remain a non-smoker.  Negative effects of tobacco on body systems was discussed with patient in detail. Patient was recommended to stop smoking. Counseled for 3 minutes. Nicotine replacement options were discussed and not prescribed per patient's request.      2. Prostate cancer  Assessment & Plan:  Chronic, stable on bicalutamide.  Followed by Urology.      3. Anaphylaxis, sequela  Assessment & Plan:  Prior to TAVR procedure, peripheral IV inserted with normal saline injected. Patient then became hypotensive, dyspneic, and diaphoretic, with rasha cross torso. Placed on NRB and given solumedrol, benadryl, and nebs. S/p omar, then started on epi gtt and levo gtt. Patient admitted to the ICU, since improved with ability to wean o2 and pressors. Unknown source, no medication administered prior to reaction. No h/o anaphylaxis, no significant allergies.   Patient still with rash on back. Taking benadryl prn.      4. Pulmonary hypertension  Assessment & Plan:  Chronic, stable.  Followed by Cardiology.      5. History of transcatheter aortic valve replacement (TAVR)  Overview:  H/o severe AS, presented for outpatient TAVR. Deferred due to anaphylactic reaction pre-procedure.   - TAVR on 5/29/25 Dr. Myra Ledbetter.      6. Mixed hyperlipidemia  Assessment & Plan:  Chronic, stable.  Continue statin. Needs clarification is pravastatin 40 mg d/c and start atorvastatin 80 mg.  To see Cardiology 6/11/25.      7. Nonrheumatic aortic valve stenosis  Overview:  H/o severe AS, presented for outpatient TAVR. Deferred due to anaphylactic reaction pre-procedure.   - TAVR on 5/29/25 Dr. Myra Ledbetter.    Assessment & Plan:  Stable.  Has follow up with Cardiology 6/11/25.  Continue plavix, benazepril, hctz, statin.             Medication List on Discharge:     Medication List            Accurate as of Caitlin 10, 2025 11:59 PM. If you have any questions, ask your nurse or doctor.                CONTINUE taking these  medications      atorvastatin 80 MG tablet  Commonly known as: LIPITOR  Take 1 tablet (80 mg total) by mouth every evening.     b complex vitamins tablet  Take 1 tablet by mouth once daily.     benazepriL 20 MG tablet  Commonly known as: LOTENSIN  Take 1 tablet (20 mg total) by mouth 2 (two) times daily.     bicalutamide 50 MG Tab  Commonly known as: CASODEX  TAKE 1 TABLET (50 MG TOTAL) BY MOUTH ONCE DAILY.     clopidogreL 75 mg tablet  Commonly known as: PLAVIX  Take 1 tablet (75 mg total) by mouth once daily.     hydroCHLOROthiazide 12.5 MG Tab  Take 1 tablet (12.5 mg total) by mouth every morning.     multivitamin capsule  Take 1 capsule by mouth once daily.              Medication Reconciliation:  Were medications changed on discharge? Yes  Were medications in the home? Yes  Is the patient taking the medications as directed? Yes  Does the patient understand the medications and changes? Yes  Does updated med list accurately reflects meds patient is currently taking? Yes    ENVIRONMENT OF CARE      Family and/or Caregiver present at visit?  No  Name of Caregiver: none  History provided by: patient    Advance Care Planning   Advanced Care Planning Status:  Patient has had an ACP conversation  Living Will: No  Power of : No  [place]: No    Does Caregiver have HCPoA: No  Changes today: none  Is patient hospice appropriate: No  (If needed, use PPS <30 or FAST score >7)  Was referral to hospice placed: No       Impression upon entering the home:  Physical Dwelling: single family home   Appearance of home environment: cleaniness: clean, walking pathways: clear, lighting: adequate, and home structure: sound structure  Functional Status: independent  Mobility: ambulatory  Nutritional access: adequate intake and access  Home Health: No, and does not need it at this time   DME/Supplies: none     Diagnostic tests reviewed/disposition: I have reviewed all completed as well as pending diagnostic tests at the time of  discharge.  Disease/illness education: s/p TAVR, anaphylaxis, early smoking cessation  Establishment or re-establishment of referral orders for community resources: No other necessary community resources.   Discussion with other health care providers: No discussion with other health care providers necessary.   Does patient have a PCP at [facility]? Yes   Repatriation plan with PCP? follow-up with PCP within 30d   Does patient have an ostomy (ileostomy, colostomy, suprapubic catheter, nephrostomy tube, tracheostomy, PEG tube, pleurex catheter, cholecystostomy, etc)? No  Were BPAs reviewed? Yes    Social History     Socioeconomic History    Marital status:     Number of children: 5   Occupational History     Employer: [employer]   Tobacco Use    Smoking status: Former     Current packs/day: 1.00     Average packs/day: 1.0 packs/day     Types: Cigarettes     Start date: 5/28/2025     Passive exposure: Current    Smokeless tobacco: Never   Vaping Use    Vaping status: Never Used   Substance and Sexual Activity    Alcohol use: Not Currently     Comment: 6-7 nightly for yrs/ stopped 2024    Drug use: No    Sexual activity: Yes     Partners: Female     Social Drivers of Health     Financial Resource Strain: Low Risk  (5/29/2025)    Overall Financial Resource Strain (CARDIA)     Difficulty of Paying Living Expenses: Not hard at all   Food Insecurity: No Food Insecurity (5/29/2025)    Hunger Vital Sign     Worried About Running Out of Food in the Last Year: Never true     Ran Out of Food in the Last Year: Never true   Transportation Needs: No Transportation Needs (5/29/2025)    PRAPARE - Transportation     Lack of Transportation (Medical): No     Lack of Transportation (Non-Medical): No   Physical Activity: Sufficiently Active (5/29/2025)    Exercise Vital Sign     Days of Exercise per Week: 7 days     Minutes of Exercise per Session: 60 min   Stress: No Stress Concern Present (5/29/2025)    [place] [employer]  [employer] - Occupational Stress Questionnaire     Feeling of Stress : Not at all   Housing Stability: Low Risk  (5/29/2025)    Housing Stability Vital Sign     Unable to Pay for Housing in the Last Year: No     Homeless in the Last Year: No       OBJECTIVE:     Vital Signs:  Vitals:    06/10/25 0915   BP: 110/68   Pulse: 68   Resp: 20   Temp: 97.9 °F (36.6 °C)       Review of Systems   Constitutional:  Positive for activity change, appetite change and fatigue. Negative for unexpected weight change.   HENT: Negative.     Eyes: Negative.    Respiratory:  Positive for shortness of breath (on with walking to mail box).    Cardiovascular:  Negative for chest pain, palpitations and leg swelling.   Gastrointestinal: Negative.    Endocrine: Negative.    Genitourinary: Negative.    Musculoskeletal: Negative.    Skin:  Positive for rash.   Neurological:  Positive for weakness.   Hematological:  Bruises/bleeds easily.   Psychiatric/Behavioral: Negative.         Physical Exam:  Physical Exam  Constitutional:       General: He is not in acute distress.     Appearance: He is normal weight. He is ill-appearing.   HENT:      Head: Normocephalic and atraumatic.      Right Ear: External ear normal.      Left Ear: External ear normal.      Nose: Nose normal.      Mouth/Throat:      Mouth: Mucous membranes are dry.      Pharynx: Oropharynx is clear.   Eyes:      Extraocular Movements: Extraocular movements intact.      Conjunctiva/sclera: Conjunctivae normal.      Pupils: Pupils are equal, round, and reactive to light.   Cardiovascular:      Rate and Rhythm: Normal rate and regular rhythm.      Pulses: Normal pulses.      Heart sounds: Normal heart sounds.   Pulmonary:      Effort: Pulmonary effort is normal. No respiratory distress.      Breath sounds: Normal breath sounds.   Abdominal:      General: Bowel sounds are normal.      Palpations: Abdomen is soft.   Musculoskeletal:         General: Normal range of motion.       Cervical back: Normal range of motion and neck supple.   Skin:     General: Skin is warm and dry.      Capillary Refill: Capillary refill takes 2 to 3 seconds.      Findings: Rash (papular rash on back) present.   Neurological:      Mental Status: He is alert and oriented to person, place, and time.      Motor: Weakness (mild) present.      Gait: Gait normal.   Psychiatric:         Mood and Affect: Mood normal.         Behavior: Behavior normal.         Thought Content: Thought content normal.         Judgment: Judgment normal.       INSTRUCTIONS FOR PATIENT:     Scheduled Follow-up, Appts Reviewed with Modifications if Needed: Yes  Future Appointments   Date Time Provider Department Center   6/13/2025 11:00 AM ECHO, [facility] NOM ECHOSTR [facility]   6/13/2025 12:10 PM LAB, APPOINTMENT [facility] LAB [facility]   6/13/2025  1:00 PM Deb Jernigan PA-C [facility] CARDVAL [facility]   10/27/2025 11:00 AM [facility] LABORATORY [facility] LAB [facility]   11/3/2025  9:30 AM EZ Kwan MD [facility] UROLOGY [place]   11/3/2025 10:30 AM INJECTION SCHEDULE, [facility] IN[facility]USION [facility]   11/3/2025 11:00 AM Genaro Carreno MD [facility] CARDIO [place]   11/5/2025 10:00 AM David Arreola MD [facility]     I spent a total of 40 minutes on the day of the visit.This includes face to face time and non-face to face time preparing to see the patient (eg, review of tests), obtaining and/or reviewing separately obtained history, documenting clinical information in the electronic or other health record, independently interpreting results and communicating results to the patient/family/caregiver, or care coordinator.        Signature: Stacie Quiñones NP    Transition of Care Visit:  I have reviewed and updated the history and problem list.  I have reconciled the medication list.  I have discussed the hospitalization and current medical issues, prognosis and plans with the patient/family.

## 2025-06-13 ENCOUNTER — OFFICE VISIT (OUTPATIENT)
Dept: CARDIOLOGY | Facility: CLINIC | Age: 80
End: 2025-06-13
Payer: MEDICARE

## 2025-06-13 ENCOUNTER — HOSPITAL ENCOUNTER (OUTPATIENT)
Dept: CARDIOLOGY | Facility: HOSPITAL | Age: 80
Discharge: HOME | End: 2025-06-13
Attending: INTERNAL MEDICINE
Payer: MEDICARE

## 2025-06-13 VITALS
HEIGHT: 74 IN | SYSTOLIC BLOOD PRESSURE: 128 MMHG | HEART RATE: 64 BPM | WEIGHT: 177.94 LBS | OXYGEN SATURATION: 99 % | BODY MASS INDEX: 22.84 KG/M2 | DIASTOLIC BLOOD PRESSURE: 60 MMHG

## 2025-06-13 VITALS
BODY MASS INDEX: 22.97 KG/M2 | HEIGHT: 74 IN | SYSTOLIC BLOOD PRESSURE: 110 MMHG | HEART RATE: 50 BPM | DIASTOLIC BLOOD PRESSURE: 68 MMHG | WEIGHT: 179 LBS

## 2025-06-13 DIAGNOSIS — I35.0 NONRHEUMATIC AORTIC VALVE STENOSIS: ICD-10-CM

## 2025-06-13 DIAGNOSIS — I10 ESSENTIAL HYPERTENSION: ICD-10-CM

## 2025-06-13 DIAGNOSIS — I35.8 AORTIC VALVE SCLEROSIS: ICD-10-CM

## 2025-06-13 DIAGNOSIS — Z95.3 S/P TAVR (TRANSCATHETER AORTIC VALVE REPLACEMENT): Primary | ICD-10-CM

## 2025-06-13 LAB
AORTIC SIZE INDEX: 1.9 CM/M2
ASCENDING AORTA: 3.9 CM
AV AREA BY CONTINUOUS VTI: 2 CM2
AV INDEX (PROSTH): 0.39
AV LVOT MEAN GRADIENT: 2 MMHG
AV LVOT PEAK GRADIENT: 3 MMHG
AV MEAN GRADIENT: 11 MMHG
AV PEAK GRADIENT: 21 MMHG
AV REGURGITATION PRESSURE HALF TIME: 920 MS
AV VALVE AREA BY VELOCITY RATIO: 1.3 CM²
AV VALVE AREA: 1.5 CM²
AV VELOCITY RATIO: 0.35
BSA FOR ECHO PROCEDURE: 2.06 M2
CV ECHO LV RWT: 0.25 CM
DOP CALC AO PEAK VEL: 2.3 M/S
DOP CALC AO VTI: 46.1 CM
DOP CALC LVOT AREA: 3.8 CM2
DOP CALC LVOT DIAMETER: 2.2 CM
DOP CALC LVOT PEAK VEL: 0.8 M/S
DOP CALC LVOT STROKE VOLUME: 68 CM3
DOP CALCLVOT PEAK VEL VTI: 17.9 CM
E WAVE DECELERATION TIME: 298 MS
E/A RATIO: 0.79
E/E' RATIO: 6 M/S
ECHO EF ESTIMATED: 48 %
ECHO LV POSTERIOR WALL: 0.7 CM (ref 0.6–1.1)
EJECTION FRACTION: 40 %
FRACTIONAL SHORTENING: 24.6 % (ref 28–44)
INTERVENTRICULAR SEPTUM: 0.9 CM (ref 0.6–1.1)
IVRT: 116 MS
LA MAJOR: 5.8 CM
LA MINOR: 5.6 CM
LA WIDTH: 4.7 CM
LEFT ATRIUM SIZE: 3.6 CM
LEFT ATRIUM VOLUME INDEX MOD: 49 ML/M2
LEFT ATRIUM VOLUME INDEX: 40 ML/M2
LEFT ATRIUM VOLUME MOD: 101 ML
LEFT ATRIUM VOLUME: 82 CM3
LEFT INTERNAL DIMENSION IN SYSTOLE: 4.3 CM (ref 2.1–4)
LEFT VENTRICLE DIASTOLIC VOLUME INDEX: 77.29 ML/M2
LEFT VENTRICLE DIASTOLIC VOLUME: 160 ML
LEFT VENTRICLE MASS INDEX: 82.2 G/M2
LEFT VENTRICLE SYSTOLIC VOLUME INDEX: 40.1 ML/M2
LEFT VENTRICLE SYSTOLIC VOLUME: 83 ML
LEFT VENTRICULAR INTERNAL DIMENSION IN DIASTOLE: 5.7 CM (ref 3.5–6)
LEFT VENTRICULAR MASS: 170.2 G
LV LATERAL E/E' RATIO: 5.6
LV SEPTAL E/E' RATIO: 5.6
MV A" WAVE DURATION": 119.89 MS
MV PEAK A VEL: 0.57 M/S
MV PEAK E VEL: 0.45 M/S
OHS CV RV/LV RATIO: 0.79 CM
PISA TR MAX VEL: 2.6 M/S
PULM VEIN A" WAVE DURATION": 119.89 MS
PULM VEIN S/D RATIO: 1.1
PULMONIC VEIN PEAK A VELOCITY: 0.6 M/S
PV PEAK D VEL: 0.52 M/S
PV PEAK S VEL: 0.57 M/S
RA MAJOR: 6.27 CM
RA PRESSURE ESTIMATED: 3 MMHG
RA WIDTH: 4.05 CM
RIGHT VENTRICLE DIASTOLIC BASEL DIMENSION: 4.5 CM
RV TB RVSP: 6 MMHG
RV TISSUE DOPPLER FREE WALL SYSTOLIC VELOCITY 1 (APICAL 4 CHAMBER VIEW): 15.22 CM/S
TDI LATERAL: 0.08 M/S
TDI SEPTAL: 0.08 M/S
TDI: 0.08 M/S
TRICUSPID ANNULAR PLANE SYSTOLIC EXCURSION: 2.8 CM
TV PEAK GRADIENT: 27 MMHG
TV REST PULMONARY ARTERY PRESSURE: 30 MMHG
Z-SCORE OF LEFT VENTRICULAR DIMENSION IN END DIASTOLE: -0.99
Z-SCORE OF LEFT VENTRICULAR DIMENSION IN END SYSTOLE: 0.87

## 2025-06-13 PROCEDURE — 99214 OFFICE O/P EST MOD 30 MIN: CPT | Mod: S$GLB,,,

## 2025-06-13 PROCEDURE — 3288F FALL RISK ASSESSMENT DOCD: CPT | Mod: CPTII,S$GLB,,

## 2025-06-13 PROCEDURE — 93306 TTE W/DOPPLER COMPLETE: CPT | Mod: 26,,, | Performed by: INTERNAL MEDICINE

## 2025-06-13 PROCEDURE — 1101F PT FALLS ASSESS-DOCD LE1/YR: CPT | Mod: CPTII,S$GLB,,

## 2025-06-13 PROCEDURE — 1126F AMNT PAIN NOTED NONE PRSNT: CPT | Mod: CPTII,S$GLB,,

## 2025-06-13 PROCEDURE — 3078F DIAST BP <80 MM HG: CPT | Mod: CPTII,S$GLB,,

## 2025-06-13 PROCEDURE — 1159F MED LIST DOCD IN RCRD: CPT | Mod: CPTII,S$GLB,,

## 2025-06-13 PROCEDURE — 1111F DSCHRG MED/CURRENT MED MERGE: CPT | Mod: CPTII,S$GLB,,

## 2025-06-13 PROCEDURE — 3074F SYST BP LT 130 MM HG: CPT | Mod: CPTII,S$GLB,,

## 2025-06-13 PROCEDURE — 99999 PR PBB SHADOW E&M-EST. PATIENT-LVL IV: CPT | Mod: PBBFAC,,,

## 2025-06-13 PROCEDURE — 1160F RVW MEDS BY RX/DR IN RCRD: CPT | Mod: CPTII,S$GLB,,

## 2025-06-13 PROCEDURE — 93306 TTE W/DOPPLER COMPLETE: CPT

## 2025-06-13 NOTE — ASSESSMENT & PLAN NOTE
Reports hasn't smoked since 5/28/25 hospitalization.  Reports cravings but indicates he will not smoke, doesn't want nicotine patches.  Encouraged to remain a non-smoker.  Negative effects of tobacco on body systems was discussed with patient in detail. Patient was recommended to stop smoking. Counseled for 3 minutes. Nicotine replacement options were discussed and not prescribed per patient's request.

## 2025-06-13 NOTE — PROGRESS NOTES
Interventional Cardiology Clinic Note    General Cardiologist: Dr. Carreno    HPI:     Dutch Olivier is a 79 y.o. male with nonobstructive CAD, COPD, HLD, HTN, prostate ca w bone mets, and severe AS s/p TAVR 5/25 who presents for 1 month TAVR follow-up.    Patient presented for outpatient TAVR on 5/28/25.  Prior to the procedure he had a peripheral IV inserted with normal saline injected and became acutely dyspneic, tachycardic and diaphoretic. He became unresponsive, did not lose pulse. He was admitted to ICU for anaphylaxis treatment with epinephrine and Levophed.  He underwent TAVR with a 29 mm Kathia S3 valve (Nominal) on 5/9/2025 by Dr. Renteria with successful shockwave to the left CFA/EIA/SILVESTRE.     Following TAVR, he was intubated and placed on mechanical ventilation due to aspiration. A CT head was obtained to evaluate for possible stroke in the setting of post-procedural dysphagia, imaging showed no acute intracranial pathology. He was subsequently reassessed by Speech Therapy and cleared for a regular diet with thin liquids. On 06/02/2025, the patient developed sudden-onset acute hypoxic respiratory failure, which improved with high-flow oxygen therapy. 6MWT was performed and indicated a need for supplemental oxygen with exertion. He was discharged on Plavix, home medications, and home oxygen.    He recently saw General Cardiology for follow-up.  Reportedly had improved dyspnea on exertion and orthostatic dizziness.  Lower BP readings were reported and HCTZ was discontinued. Denied other symptoms. Patient is going back to [place] next week and is here for early one month follow-up today. Doing well overall. He monitors his O2 levels at home, which have been in the upper 90s, including a reading of 99% exertion. Has not yet used supplemental O2. He has COPD and some baseline shortness of breath. He reports recently quit smoking and noticing a substantial decrease in phlegm production. He can lie  comfortably on his side, no orthopnea or PND. He denies dyspnea at rest. He mentions increased bleeding tendency, which he attributes to Plavix.    He plans to return sometime in November for current scheduled appointments with his regular cardiologist, Dr. Carreno, including an echo. He denies chest pain, palpitations, edema of lower extremities, orthopnea, dyspnea, and other bleeding concerns such as epistaxis, GI bleeds, or rectal bleeding.    NYHA II / CCS 0    ROS:      ROS findings as noted in HPI above.    PMH:     Past Medical History:   Diagnosis Date    Alcohol abuse, daily use     8/2024 stopped    Amblyopia     OS    Aortic valve stenosis     Cancer     melanoma, prostate    Cataract     Complication of anesthesia     says he sometimes is slow to awaken    COPD (chronic obstructive pulmonary disease)     no oxygen, no inhalers, or nebulizers    Diverticulosis     Gross hematuria     History of colonic polyps     History of malignant melanoma 01/01/2011    right ear, had chemo//Dr Gongora    HTN (hypertension)     Hyperlipemia     Kidney stone      Past Surgical History:   Procedure Laterality Date    CARDIAC CATH COSURGEON N/A 5/29/2025    Procedure: Cardiac Cath Cosurgeon;  Surgeon: Jose Antonio Langston MD;  Location: [facility] CATH LAB;  Service: Cardiothoracic;  Laterality: N/A;    CORONARY ANGIOGRAPHY N/A 5/16/2025    Procedure: ANGIOGRAM, CORONARY ARTERY;  Surgeon: Gume Blood MD;  Location: [facility] CATH LAB;  Service: Cardiology;  Laterality: N/A;    cystolithopaxy      CYSTOSCOPIC LITHOLAPAXY N/A 3/25/2025    Procedure: CYSTOLITHOLAPAXY - with Thulium Laser - Bladder Stone;  Surgeon: EZ Kwan MD;  Location: [facility];  Service: Urology;  Laterality: N/A;    EXTERNAL EAR SURGERY  01/01/2011    melanoma removed right ear    HERNIA REPAIR      RIH    PORTACATH PLACEMENT  01/01/2011    later removed    PROSTATE SURGERY  01/01/2003    prostatectomy    PTA, ILIAC ARTERY  5/29/2025    Procedure: PTA,  "Iliac Artery;  Surgeon: Gume Blood MD;  Location: [facility] CATH LAB;  Service: Cardiology;;  SHOCKWAVE    TRANSCATHETER AORTIC VALVE REPLACEMENT (TAVR) N/A 5/29/2025    Procedure: REPLACEMENT, AORTIC VALVE, TRANSCATHETER (TAVR);  Surgeon: Gume Blood MD;  Location: [facility] CATH LAB;  Service: Cardiology;  Laterality: N/A;    TRANSCATHETER AORTIC VALVE REPLACEMENT (TAVR)  5/29/2025    Procedure: REPLACEMENT, AORTIC VALVE, TRANSCATHETER (TAVR);  Surgeon: Jose Antonio Langston MD;  Location: [facility] CATH LAB;  Service: Cardiothoracic;;     Allergies:     Review of patient's allergies indicates:   Allergen Reactions    Aspirin Hives     Medications:   Medications Ordered Prior to Encounter[1]  Social History:     Social History     Tobacco Use    Smoking status: Former     Current packs/day: 1.00     Average packs/day: 1 pack/day for 0.1 years (0.1 ttl pk-yrs)     Types: Cigarettes     Start date: 5/28/2025     Passive exposure: Current    Smokeless tobacco: Never   Substance Use Topics    Alcohol use: Not Currently     Comment: 6-7 nightly for yrs/ stopped 2024     Family History:     Family History   Problem Relation Name Age of Onset    Heart attack Mother      Blindness Father      Alcohol abuse Father      Alcohol abuse Brother      Drug abuse Brother      Heart defect Maternal Uncle      Cancer Neg Hx      Heart disease Neg Hx      Diabetes Neg Hx      Glaucoma Neg Hx      Macular degeneration Neg Hx      Retinal detachment Neg Hx       Physical Exam:   /60 (BP Location: Left arm, Patient Position: Sitting)   Pulse 64   Ht 6' 2" (1.88 m)   Wt 80.7 kg (177 lb 14.6 oz)   SpO2 99%   BMI 22.84 kg/m²        Physical Exam  Vitals and nursing note reviewed.   Constitutional:       General: He is not in acute distress.     Appearance: Normal appearance. He is not ill-appearing or toxic-appearing.   HENT:      Head: Normocephalic and atraumatic.   Eyes:      Pupils: Pupils are equal, round, and reactive to " light.   Cardiovascular:      Rate and Rhythm: Normal rate.   Pulmonary:      Effort: Pulmonary effort is normal. No respiratory distress.   Musculoskeletal:         General: Normal range of motion.      Cervical back: Normal range of motion.   Skin:     General: Skin is warm and dry.      Capillary Refill: Capillary refill takes less than 2 seconds.   Neurological:      General: No focal deficit present.      Mental Status: He is alert.          Labs:     Lab Results   Component Value Date     06/04/2025     03/20/2025    K 3.6 06/04/2025    K 4.3 03/20/2025     06/04/2025     03/20/2025    CO2 24 06/04/2025    CO2 29 03/20/2025    BUN 11 06/04/2025    CREATININE 0.9 06/13/2025    ANIONGAP 7 (L) 06/04/2025     Lab Results   Component Value Date    HGBA1C 5.1 10/08/2024     Lab Results   Component Value Date     (H) 05/29/2025     (H) 05/29/2025    BNP 56 05/28/2025    Lab Results   Component Value Date    WBC 9.46 06/13/2025    HGB 11.4 (L) 06/13/2025    HGB 13.0 (L) 03/20/2025    HCT 35.6 (L) 06/13/2025    HCT 28 (L) 06/02/2025     (H) 06/13/2025     03/20/2025    GRAN 5.6 09/13/2023    GRAN 59.2 09/13/2023     Lab Results   Component Value Date    CHOL 180 10/08/2024    HDL 40 10/08/2024    LDLCALC 96.2 10/08/2024    TRIG 219 (H) 10/08/2024          Lipids:  Recent Labs   Lab 10/08/24  1115   LDL Cholesterol 96.2   HDL 40      Renal:  Recent Labs   Lab 06/04/25  0558 06/13/25  1126   Creatinine 0.7 0.9   Potassium 3.6  --    CO2 24  --    BUN 11  --      Liver:  Recent Labs   Lab 06/04/25  0558   AST 13   ALT 13       Imaging:     TTE (06/18/2025)(one month post-TAVR):    Left Ventricle: The left ventricle is at the upper limit of normal in size measuring 5.7 cm. Normal wall thickness. There is mildly reduced systolic function. Ejection fraction is approximately 40%. There is indeterminate diastolic function.    Right Ventricle: The right ventricle is mildly  dilated Systolic function is normal. TAPSE is 2.8 cm.    Left Atrium: The left atrium is severely dilated measuring 49 mL/m2. Atrial septum is bulging to the left. The pulmonary veins have systolic blunting.    Right Atrium: The right atrium is mildly dilated .    Aortic Valve: There is a 29 mm transcatheter Woodruff Kathia S3 valve. Aortic valve area by VTI is 1.5 cm². LVOT diameter is 2.2 cm. Aortic valve peak velocity is 2.3 m/s. Mean gradient is 11 mmHg. The dimensionless index is 0.39. There is no significant regurgitation. Trace paravalvular regurgitation.    Mitral Valve: There is mild regurgitation.    Tricuspid Valve: There is mild regurgitation.    Pulmonary Artery: The estimated pulmonary artery systolic pressure is 30 mmHg.    IVC/SVC: Normal venous pressure at 3 mmHg.    TTE (4/22/2025) (pre-TAVR):     Left Ventricle: The left ventricle is normal in size. Normal wall thickness. There is normal systolic function with a visually estimated ejection fraction of 60 - 65%. There is normal diastolic function.    Right Ventricle: The right ventricle is normal in size. Wall thickness is normal. Systolic function is normal.    Aortic Valve: The aortic valve is a trileaflet valve. There is moderate to severe aortic valve sclerosis. Severely restricted motion. There is severe stenosis. Aortic valve area by VTI is 0.9 cm². Aortic valve peak velocity is 4.2 m/s. Mean gradient is 44 mmHg. The dimensionless index is 0.17.    Pulmonary Artery: There is borderline elevated pulmonary hypertension. The estimated pulmonary artery systolic pressure is 34 mmHg.    IVC/SVC: Normal venous pressure at 3 mmHg.    Assessment & Plan:       ICD-10-CM ICD-9-CM   1. S/P TAVR (transcatheter aortic valve replacement)  Z95.3 V43.3   2. Nonrheumatic aortic valve stenosis  I35.0 424.1   3. Essential hypertension  I10 401.9     S/P TAVR (transcatheter aortic valve replacement)  - One month TAVR follow up today. TAVR on 5/29/25 Dr. Renteria.   Improvement in symptoms.  - Echo reviewed today, shows normal functioning valve. No PVL with low mean gradient and peak velocity. EF mildly reduced 40%. Will repeat on next follow-up visit.  - Continued plavix daily indefinitely for valve protection.  Due to known aspirin allergy.  - Discussed cardiac rehab as used for guided reconditioning, although not mandatory.  - Discussed importance of antibiotic prophylaxis before dental procedures to prevent valve infection. Advised waiting approximately 6 months post-procedure before scheduling elective dental work. SBEP with antibiotics for life.  - Follow up regularly with general cardiologist as is.  - Follow up in one year for TAVR follow-up with echo before.    Nonrheumatic aortic valve stenosis  - One-month s/p TAVR.  Stable per echo.  Continue to monitor.    Essential hypertension  - Blood pressure is more stable with medication reduction of HCTZ discontinuation.  Average systolics of 120.  Continue as is.  Follow-up with General Cardiology.      Follow up in clinic with echo on/around 5/9/2026, 1 year from TAVR. Follow-up with general cardiologist/PCP for regular visits.    Signed:  Deb Jernigan PA-C  Interventional Cardiology        This note was generated with the assistance of ambient listening technology. Verbal consent was obtained by the patient and accompanying visitor(s) for the recording of patient appointment to facilitate this note. I attest to having reviewed and edited the generated note for accuracy, though some syntax or spelling errors may persist. Please contact the author of this note for any clarification.            [1]   Current Outpatient Medications on File Prior to Visit   Medication Sig Dispense Refill    atorvastatin (LIPITOR) 80 MG tablet Take 1 tablet (80 mg total) by mouth every evening. 90 tablet 3    b complex vitamins tablet Take 1 tablet by mouth once daily.      benazepriL (LOTENSIN) 20 MG tablet Take 1 tablet (20 mg total) by mouth  2 (two) times daily. 180 tablet 2    bicalutamide (CASODEX) 50 MG Tab TAKE 1 TABLET (50 MG TOTAL) BY MOUTH ONCE DAILY. 90 tablet 3    clopidogreL (PLAVIX) 75 mg tablet Take 1 tablet (75 mg total) by mouth once daily. 90 tablet 3    hydroCHLOROthiazide (HYDRODIURIL) 12.5 MG Tab Take 1 tablet (12.5 mg total) by mouth every morning. 90 tablet 2    multivitamin capsule Take 1 capsule by mouth once daily.       No current facility-administered medications on file prior to visit.

## 2025-06-13 NOTE — ASSESSMENT & PLAN NOTE
Prior to TAVR procedure, peripheral IV inserted with normal saline injected. Patient then became hypotensive, dyspneic, and diaphoretic, with rasha cross torso. Placed on NRB and given solumedrol, benadryl, and nebs. S/p omar, then started on epi gtt and levo gtt. Patient admitted to the ICU, since improved with ability to wean o2 and pressors. Unknown source, no medication administered prior to reaction. No h/o anaphylaxis, no significant allergies.   Patient still with rash on back. Taking benadryl prn.

## 2025-06-13 NOTE — ASSESSMENT & PLAN NOTE
Chronic, stable.  Continue statin. Needs clarification is pravastatin 40 mg d/c and start atorvastatin 80 mg.  To see Cardiology 6/11/25.

## 2025-06-18 PROBLEM — I35.0 NONRHEUMATIC AORTIC VALVE STENOSIS: Status: ACTIVE | Noted: 2022-08-17

## 2025-06-18 NOTE — ASSESSMENT & PLAN NOTE
- One month TAVR follow up today. TAVR on 5/29/25 Dr. Renteria.  Improvement in symptoms.  - Echo reviewed today, shows normal functioning valve. No PVL with low mean gradient and peak velocity. EF mildly reduced 40%. Will repeat on next follow-up visit.  - Continued plavix daily indefinitely for valve protection.  Due to known aspirin allergy.  - Discussed cardiac rehab as used for guided reconditioning, although not mandatory.  - Discussed importance of antibiotic prophylaxis before dental procedures to prevent valve infection. Advised waiting approximately 6 months post-procedure before scheduling elective dental work. SBEP with antibiotics for life.  - Follow up regularly with general cardiologist as is.  - Follow up in one year for TAVR follow-up with echo before.

## 2025-06-18 NOTE — ASSESSMENT & PLAN NOTE
- Blood pressure is more stable with medication reduction of HCTZ discontinuation.  Average systolics of 120.  Continue as is.  Follow-up with General Cardiology.

## 2025-06-24 ENCOUNTER — DOCUMENTATION ONLY (OUTPATIENT)
Dept: CARDIOLOGY | Facility: CLINIC | Age: 80
End: 2025-06-24
Payer: MEDICARE

## 2025-06-24 NOTE — PROGRESS NOTES
Patient presented for outpatient TAVR on 5/28/25 but developed acute anaphylaxis pre-procedure. TAVR was done on 5/29. Post-procedure, patient had ongoing respiratory symptoms. Prior to hospital discharge, 6MWT indicated exertional hypoxemia with O2 sats of 87%, and supplemental oxygen was ordered.  At one-month TAVR follow-up with me on 6/13/2025, patient reported that he had not utilized supplemental O2 at home following TAVR. He stated that he regularly self-monitored his O2 levels at home, which remained in the upper 90s at rest and with exertion on room air.    Patient no longer meets criteria for hypoxemia and is clinically stable to discontinue home oxygen.

## 2025-07-01 ENCOUNTER — TELEPHONE (OUTPATIENT)
Dept: CARDIOLOGY | Facility: CLINIC | Age: 80
End: 2025-07-01
Payer: MEDICARE

## 2025-07-01 NOTE — TELEPHONE ENCOUNTER
Spoke to Mr. Olivier, he has been unsuccessful at getting his home O2 picked up.  I contacted [facility] again and re-faxed the order to [place].  Will follow up with patient tomorrow for update

## 2025-08-13 ENCOUNTER — TELEPHONE (OUTPATIENT)
Dept: INTERNAL MEDICINE | Facility: CLINIC | Age: 80
End: 2025-08-13
Payer: MEDICARE

## 2025-08-21 ENCOUNTER — TELEPHONE (OUTPATIENT)
Dept: FAMILY MEDICINE | Facility: CLINIC | Age: 80
End: 2025-08-21
Payer: MEDICARE

## 2025-08-22 ENCOUNTER — TELEPHONE (OUTPATIENT)
Dept: FAMILY MEDICINE | Facility: CLINIC | Age: 80
End: 2025-08-22
Payer: MEDICARE

## 2025-08-25 DIAGNOSIS — D50.9 IRON DEFICIENCY ANEMIA, UNSPECIFIED IRON DEFICIENCY ANEMIA TYPE: ICD-10-CM

## 2025-08-25 DIAGNOSIS — E78.2 MIXED HYPERLIPIDEMIA: Primary | ICD-10-CM

## 2025-08-25 DIAGNOSIS — Z00.00 MEDICARE ANNUAL WELLNESS VISIT, SUBSEQUENT: ICD-10-CM

## 2025-08-26 RX ORDER — ATORVASTATIN CALCIUM 80 MG/1
80 TABLET, FILM COATED ORAL NIGHTLY
Qty: 30 TABLET | Refills: 0 | Status: SHIPPED | OUTPATIENT
Start: 2025-08-26 | End: 2026-08-26

## (undated) DEVICE — GUIDEWIRE SUPRA CORE 035 190CM

## (undated) DEVICE — PAD DEFIB CADENCE ADULT R2

## (undated) DEVICE — LUBRICANT VIPERSLIDE 100ML BAG

## (undated) DEVICE — CATH DIAG IMPULSE 6FR FR4

## (undated) DEVICE — KIT COPILOT VALVE HEMO TOOL

## (undated) DEVICE — VISE RADIFOCUS MULTI TORQUE

## (undated) DEVICE — CATH SHOCKWAVE M5+ IVL 8.0X60M

## (undated) DEVICE — SHEATH INTRODUCER 9FR 11CM

## (undated) DEVICE — KIT MNTR POLE MT DUL 12&48 MAC

## (undated) DEVICE — GUIDEWIRE STF .035X180CM ANG

## (undated) DEVICE — SHEATH PINNACLE 8FR

## (undated) DEVICE — HEMOSTAT VASC BAND REG 24CM

## (undated) DEVICE — GUIDEWIRE STF .035X260CM STR

## (undated) DEVICE — OMNIPAQUE CONTRAST 350MG/100ML

## (undated) DEVICE — CATH IMPULSE PIGTAIL 6F 110CM

## (undated) DEVICE — TRAY CATH LAB OMC

## (undated) DEVICE — KIT MICROINTRO 4F .018X40X7CM

## (undated) DEVICE — GUIDEWIRE AMPLATZ .035X260

## (undated) DEVICE — KIT CUSTOM MANIFOLD

## (undated) DEVICE — SNAP CAP 18 DOME COVERS

## (undated) DEVICE — DRAPE ANGIO BRACH 38X44IN

## (undated) DEVICE — CATH MPA2 INFINITI 4FR 100CM

## (undated) DEVICE — SYR MARK 7 ARTERION 150ML

## (undated) DEVICE — INFLATOR ENCORE 26 BLLN INFL

## (undated) DEVICE — BOWL STERILE LARGE 32OZ

## (undated) DEVICE — GUIDEWIRE X SPORT .014IN 190CM

## (undated) DEVICE — TUBING PRSS MON M/M LL 72IN

## (undated) DEVICE — GUIDEWIRE CONFIDA BECKER CURVE

## (undated) DEVICE — GUIDEWIRE EMERALD 150CM PTFE

## (undated) DEVICE — PAD RADI FEMORAL

## (undated) DEVICE — TUBING HPCIL ROT M/F ADPT 10IN

## (undated) DEVICE — KIT GLIDESHEATH SLEND 6FR 10CM

## (undated) DEVICE — CATH DXTERITY AL20 100CM 6FR

## (undated) DEVICE — CABLE PACING ALLGTR CLIP 12FT

## (undated) DEVICE — CATH IMPULSE FR4 5FR 125CM

## (undated) DEVICE — TRANSDUCER ADULT DISP

## (undated) DEVICE — CATH DIAG IMPULSE 6FR FL4

## (undated) DEVICE — SUT PERCLOSE PROSTYLE MEDIATE

## (undated) DEVICE — STOPCOCK 3-WAY

## (undated) DEVICE — Device

## (undated) DEVICE — COVER TABLE 44X90 STERILE

## (undated) DEVICE — SPIKE SHORT LG BORE 1-WAY 2IN

## (undated) DEVICE — SHEATH INTRODUCER 6FR 11CM

## (undated) DEVICE — WIRE LUNDERQUIST 260CM

## (undated) DEVICE — CUP MEDICINE GRAD STRL 2OZ